# Patient Record
Sex: MALE | Race: WHITE | NOT HISPANIC OR LATINO | Employment: FULL TIME | URBAN - METROPOLITAN AREA
[De-identification: names, ages, dates, MRNs, and addresses within clinical notes are randomized per-mention and may not be internally consistent; named-entity substitution may affect disease eponyms.]

---

## 2017-03-06 RX ORDER — SILDENAFIL 100 MG/1
100 TABLET, FILM COATED ORAL PRN
Qty: 10 TAB | Refills: 0 | Status: SHIPPED | OUTPATIENT
Start: 2017-03-06 | End: 2017-05-04 | Stop reason: SDUPTHER

## 2017-03-07 NOTE — TELEPHONE ENCOUNTER
"Pt has not been seen since 8/24/15 and would like a refill because it is a \"special weekend\". I did inform him that there is a possibility it will not be filled since her has not been seen since 2015.    Was the patient seen in the last year in this department? No     Does patient have an active prescription for medications requested? No     Received Request Via: Pharmacy      "

## 2017-05-04 ENCOUNTER — OFFICE VISIT (OUTPATIENT)
Dept: MEDICAL GROUP | Facility: PHYSICIAN GROUP | Age: 52
End: 2017-05-04
Payer: COMMERCIAL

## 2017-05-04 VITALS
RESPIRATION RATE: 16 BRPM | OXYGEN SATURATION: 98 % | DIASTOLIC BLOOD PRESSURE: 76 MMHG | TEMPERATURE: 98.1 F | SYSTOLIC BLOOD PRESSURE: 130 MMHG | HEART RATE: 98 BPM | HEIGHT: 73 IN | BODY MASS INDEX: 40.42 KG/M2 | WEIGHT: 305 LBS

## 2017-05-04 DIAGNOSIS — R73.01 IMPAIRED FASTING BLOOD SUGAR: ICD-10-CM

## 2017-05-04 DIAGNOSIS — E78.5 DYSLIPIDEMIA: ICD-10-CM

## 2017-05-04 DIAGNOSIS — I10 ESSENTIAL HYPERTENSION: ICD-10-CM

## 2017-05-04 DIAGNOSIS — E66.01 MORBID OBESITY WITH BMI OF 40.0-44.9, ADULT (HCC): ICD-10-CM

## 2017-05-04 DIAGNOSIS — E55.9 VITAMIN D INSUFFICIENCY: ICD-10-CM

## 2017-05-04 DIAGNOSIS — G47.33 OSA ON CPAP: ICD-10-CM

## 2017-05-04 DIAGNOSIS — N18.1 CHRONIC KIDNEY DISEASE, STAGE 1: ICD-10-CM

## 2017-05-04 DIAGNOSIS — Z12.5 SCREENING FOR PROSTATE CANCER: ICD-10-CM

## 2017-05-04 PROCEDURE — 99214 OFFICE O/P EST MOD 30 MIN: CPT | Performed by: FAMILY MEDICINE

## 2017-05-04 RX ORDER — SILDENAFIL 100 MG/1
100 TABLET, FILM COATED ORAL PRN
Qty: 10 TAB | Refills: 0 | OUTPATIENT
Start: 2017-05-04

## 2017-05-04 RX ORDER — SILDENAFIL 100 MG/1
100 TABLET, FILM COATED ORAL PRN
Qty: 10 TAB | Refills: 5 | Status: SHIPPED | OUTPATIENT
Start: 2017-05-04 | End: 2019-04-01

## 2017-05-04 RX ORDER — LISINOPRIL 5 MG/1
TABLET ORAL
Qty: 90 TAB | Refills: 1 | Status: SHIPPED | OUTPATIENT
Start: 2017-05-04 | End: 2019-01-31 | Stop reason: SDUPTHER

## 2017-05-04 ASSESSMENT — PATIENT HEALTH QUESTIONNAIRE - PHQ9: CLINICAL INTERPRETATION OF PHQ2 SCORE: 0

## 2017-05-04 ASSESSMENT — ENCOUNTER SYMPTOMS: FATIGUE: 1

## 2017-05-04 NOTE — PROGRESS NOTES
Subjective:      Pankaj Limon is a 51 y.o. male who presents with Medication Refill and Fatigue            Fatigue  Associated symptoms include fatigue.       Patient returns after not being seen in the wild with the last visit in 8/15. He said he has a new insurance and he can't continue to see me anymore. He will have to establish with another physician.    In terms of his hypertension, he continues to take lisinopril 5 mg daily. He denies any side effects.    He has mild dyslipidemia with the last blood work in 2015 showing LDL down from 153-100 but the HDL also went down from 45-38. At that time he was able to lose significant amount of weight with diet and exercise. He has gained his weight back with a total weight gain of 29 pounds since his last visit in 8/15. He said he has not been as active as before because of his work schedule. He has not been watching his diet either.    He also has impaired fasting blood sugar. Fasting blood sugar was already in the diabetic level in the 150s to 160s but his hemoglobin A1c was normal at 5.3 with his last blood work in 8/15.    He continues to use his CPAP machine regularly. He has not been back to the pulmonologist since his last visit in 10/15. He said he will have to establish with another group because of his new insurance.    He has vitamin D insufficiency and he said he continues to take vitamin D supplementation 2000 international units daily.    For his chronic kidney disease stage I, he was followed by Dr. Dawson with the last visit in January 2016.    Past medical history, past surgical history, family history reviewed-no changes    Social history reviewed-no changes    Allergies reviewed-no changes    Medications reviewed-no changes    Review of Systems   Constitutional: Positive for fatigue.    additionally as per history of present illness, the rest are negative.       Objective:     /76 mmHg  Pulse 98  Temp(Src) 36.7 °C (98.1 °F)  Resp 16  Ht  "1.854 m (6' 0.99\")  Wt 138.347 kg (305 lb)  BMI 40.25 kg/m2  SpO2 98%     Physical Exam     Examined alert, awake, oriented, not in distress    Neck-supple, no lymphadenopathy, no thyromegaly  Lungs-clear to auscultation, no rales, no wheezes  Heart-regular rate and rhythm, no murmur  Extremities-no edema, clubbing, cyanosis            Assessment/Plan:     1. Essential hypertension  Blood pressure is running good. He is on low-dose lisinopril 5 mg daily. I refilled his prescription.  - LIPID PROFILE; Future  - COMP METABOLIC PANEL; Future  - HEMOGLOBIN A1C; Future  - PROSTATE SPECIFIC AG SCREENING; Future  - TSH; Future  - VITAMIN D,25 HYDROXY; Future  - CBC WITH DIFFERENTIAL; Future  - lisinopril (PRINIVIL) 5 MG Tab; TAKE ONE TABLET BY MOUTH ONCE DAILY  Dispense: 90 Tab; Refill: 1    2. Dyslipidemia  We will do follow-up lipid panel. Consider treatment depending on his ten-year cardiovascular disease risk.  - LIPID PROFILE; Future  - COMP METABOLIC PANEL; Future  - HEMOGLOBIN A1C; Future  - PROSTATE SPECIFIC AG SCREENING; Future  - TSH; Future  - VITAMIN D,25 HYDROXY; Future  - CBC WITH DIFFERENTIAL; Future    3. Impaired fasting blood sugar  I will do follow-up fasting blood sugar with hemoglobin A1c.  - LIPID PROFILE; Future  - COMP METABOLIC PANEL; Future  - HEMOGLOBIN A1C; Future  - PROSTATE SPECIFIC AG SCREENING; Future  - TSH; Future  - VITAMIN D,25 HYDROXY; Future  - CBC WITH DIFFERENTIAL; Future    4. YOVANA on CPAP  Advised to establish with pulmonologist contracted by insurance.  - LIPID PROFILE; Future  - COMP METABOLIC PANEL; Future  - HEMOGLOBIN A1C; Future  - PROSTATE SPECIFIC AG SCREENING; Future  - TSH; Future  - VITAMIN D,25 HYDROXY; Future  - CBC WITH DIFFERENTIAL; Future    5. Vitamin D insufficiency  Continue vitamin D supplementation and we will check vitamin D level.  - LIPID PROFILE; Future  - COMP METABOLIC PANEL; Future  - HEMOGLOBIN A1C; Future  - PROSTATE SPECIFIC AG SCREENING; Future  - " TSH; Future  - VITAMIN D,25 HYDROXY; Future  - CBC WITH DIFFERENTIAL; Future    6. Screening for prostate cancer  We will do screening PSA with his blood work.  - LIPID PROFILE; Future  - COMP METABOLIC PANEL; Future  - HEMOGLOBIN A1C; Future  - PROSTATE SPECIFIC AG SCREENING; Future  - TSH; Future  - VITAMIN D,25 HYDROXY; Future  - CBC WITH DIFFERENTIAL; Future    7. Morbid obesity with BMI of 40.0-44.9, adult (CMS-[facility])  Discussed diet, exercise and weight loss.  - Patient identified as having weight management issue.  Appropriate orders and counseling given.    8. Chronic kidney disease stage I  We will do follow-up blood work. Continue to avoid nephrotoxic agents. He will establish with the nephrologist contracted with his insurance.    This is the patient's last visit because his insurance is not contracted with us.      Please note that this dictation was created using voice recognition software. I have worked with consultants from the vendor as well as technical experts from [employer] to optimize the interface. I have made every reasonable attempt to correct obvious errors, but I expect that there are errors of grammar and possibly content I did not discover before finalizing the note.

## 2017-05-04 NOTE — Clinical Note
[facility]  Melly Jimenez M.D.  [address]  [place] [address]  Fax: [phone]   Authorization for Release/Disclosure of   Protected Health Information   Name: STACEY KOWALSKI : 1965 SSN: [id number]   Address: [address] [place] [address] [address]  [place] [address] Phone:    [phone] (home)    I authorize the entity listed below to release/disclose the PHI below to:  Stacey Kowalski (self)   Provider or Entity Name:  Dr.Bella Jimenez    Address   City, [facility], [facility]   Phone:      Fax:     Reason for request: continuity of care   Information to be released:    [  ] LAST COLONOSCOPY,  including any PATH REPORT and follow-up  [  ] LAST FIT/COLOGUARD RESULT [  ] LAST DEXA  [  ] LAST MAMMOGRAM  [  ] LAST PAP  [  ] LAST LABS [  ] RETINA EXAM REPORT  [  ] IMMUNIZATION RECORDS  [ x ] Release all info      [x  ] Check here and initial the line next to each item to release ALL health information INCLUDING  _____ Care and treatment for drug and / or alcohol abuse  _____ HIV testing, infection status, or AIDS  _____ Genetic Testing    DATES OF SERVICE OR TIME PERIOD TO BE DISCLOSED: _____________  I understand and acknowledge that:  * This Authorization may be revoked at any time by you in writing, except if your health information has already been used or disclosed.  * Your health information that will be used or disclosed as a result of you signing this authorization could be re-disclosed by the recipient. If this occurs, your re-disclosed health information may no longer be protected by State or Federal laws.  * You may refuse to sign this Authorization. Your refusal will not affect your ability to obtain treatment.  * This Authorization becomes effective upon signing and will  on (date) __________.      If no date is indicated, this Authorization will  one (1) year from the signature date.    Name: Stacey Kowalski    Signature:   Date:     2017       PLEASE FAX REQUESTED RECORDS BACK TO: [phone]

## 2017-05-04 NOTE — TELEPHONE ENCOUNTER
Was the patient seen in the last year in this department? Yes     Does patient have an active prescription for medications requested? No     Received Request Via: Pharmacy-viagra renewal on fax states pt needs max daily dose

## 2017-05-04 NOTE — MR AVS SNAPSHOT
"        Pankaj Ashly   2017 7:40 AM   Office Visit   MRN: [id number]    Department:  [facility]   Dept Phone:  [phone]    Description:  Male : 1965   Provider:  Melly Jimenez M.D.           Reason for Visit     Medication Refill     Fatigue           Allergies as of 2017     No Known Allergies      You were diagnosed with     Essential hypertension   [2695375]       Dyslipidemia   [380089]       Impaired fasting blood sugar   [375544]       YOVANA on CPAP   [068362]       Vitamin D insufficiency   [177330]       Chronic kidney disease, stage 1   [5644310]       Screening for prostate cancer   [871919]       Morbid obesity with BMI of 40.0-44.9, adult (CMS-HCC)   [941296]         Vital Signs     Blood Pressure Pulse Temperature Respirations Height Weight    130/76 mmHg 98 36.7 °C (98.1 °F) 16 1.854 m (6' 0.99\") 138.347 kg (305 lb)    Body Mass Index Oxygen Saturation Smoking Status             40.25 kg/m2 98% Never Smoker          Basic Information     Date Of Birth Sex Race Ethnicity Preferred Language    1965 Male White Non- English      Problem List              ICD-10-CM Priority Class Noted - Resolved    HTN (hypertension) I10   Unknown - Present    ED (erectile dysfunction) N52.9   Unknown - Present    YOVANA on CPAP G47.33, Z99.89   Unknown - Present    Morbid obesity with BMI of 40.0-44.9, adult ([facility]) E66.01, Z68.41   2017 - Present      Health Maintenance        Date Due Completion Dates    COLONOSCOPY 2023 (Done)    Override on 2013: Done    IMM DTaP/Tdap/Td Vaccine (2 - Td) 2024            Current Immunizations     Influenza Vaccine Quad Inj (Pf) 10/3/2016, 10/21/2014    Tdap Vaccine 2014      Below and/or attached are the medications your provider expects you to take. Review all of your home medications and newly ordered medications with your provider and/or pharmacist. Follow medication instructions as directed by your provider " and/or pharmacist. Please keep your medication list with you and share with your provider. Update the information when medications are discontinued, doses are changed, or new medications (including over-the-counter products) are added; and carry medication information at all times in the event of emergency situations     Allergies:  No Known Allergies          Medications  Valid as of: May 04, 2017 -  8:32 AM    Generic Name Brand Name Tablet Size Instructions for use    Colchicine (Tab) COLCRYS 0.6 MG         Colchicine (Tab) COLCRYS 0.6 MG Take 0.6 mg by mouth every day.        Indomethacin (Cap) INDOCIN 50 MG         Lisinopril (Tab) PRINIVIL 5 MG TAKE ONE TABLET BY MOUTH ONCE DAILY        Sildenafil Citrate (Tab) VIAGRA 100 MG Take 1 Tab by mouth as needed for Erectile Dysfunction.        .                 Medicines prescribed today were sent to:     [facility] [address] [facility], [place] - [address]    [address] [place] [address]    Phone: [phone] Fax: [phone]    Open 24 Hours?: No      Medication refill instructions:       If your prescription bottle indicates you have medication refills left, it is not necessary to call your provider’s office. Please contact your pharmacy and they will refill your medication.    If your prescription bottle indicates you do not have any refills left, you may request refills at any time through one of the following ways: The online [employer] system (except Urgent Care), by calling your provider’s office, or by asking your pharmacy to contact your provider’s office with a refill request. Medication refills are processed only during regular business hours and may not be available until the next business day. Your provider may request additional information or to have a follow-up visit with you prior to refilling your medication.   *Please Note: Medication refills are assigned a new Rx number when refilled electronically. Your pharmacy may indicate that no  refills were authorized even though a new prescription for the same medication is available at the pharmacy. Please request the medicine by name with the pharmacy before contacting your provider for a refill.        Your To Do List     Future Labs/Procedures Complete By Expires    CBC WITH DIFFERENTIAL  As directed 5/5/2018    COMP METABOLIC PANEL  As directed 5/5/2018    HEMOGLOBIN A1C  As directed 5/5/2018    LIPID PROFILE  As directed 5/5/2018    PROSTATE SPECIFIC AG SCREENING  As directed 5/5/2018    TSH  As directed 5/5/2018    VITAMIN D,25 HYDROXY  As directed 5/5/2018      Instructions    Patient instructions given regarding labs, x-rays, medications, referral and followup appointment.          [employer] Access Code: Activation code not generated  Current [employer] Status: Active

## 2017-05-05 ENCOUNTER — TELEPHONE (OUTPATIENT)
Dept: MEDICAL GROUP | Facility: PHYSICIAN GROUP | Age: 52
End: 2017-05-05

## 2017-05-05 NOTE — TELEPHONE ENCOUNTER
1. Caller Name: [facility]                       Call Back Number: [phone]    2. Message: Pharmacy notified of Dr. Burns message.     3. Patient approves office to leave a detailed voicemail/MyChart message: N\A    JODI Riggs, [facility]        Caller: Unspecified (Today, 9:40 AM)                     He suppose not to take it daily. He can take it as needed. Max dose he can take is 100 mg   Chris Burns M.D.

## 2017-05-05 NOTE — TELEPHONE ENCOUNTER
1. Caller Name:Delroy Thakur pharmacist          Call Back Number: [phone]     2. Message: Pharmacist calling to see what is the max daily does for pt viagra rx? To Dr. Jimenez to advise.    3. Patient approves office to leave a detailed voicemail/MyChart message: N\A

## 2017-06-30 ENCOUNTER — TELEPHONE (OUTPATIENT)
Dept: MEDICAL GROUP | Facility: PHYSICIAN GROUP | Age: 52
End: 2017-06-30

## 2017-06-30 NOTE — TELEPHONE ENCOUNTER
Patient called earlier requesting his lab results from José Miguel - adonay in May.  We did not have the results so patient had José Miguel fax them over. To  to review.

## 2017-06-30 NOTE — Clinical Note
July 7, 2017        Pankaj Limon  [address]  [address]  [place] [address]

## 2017-07-03 NOTE — TELEPHONE ENCOUNTER
I reviewed the patient's labs. I ordered several labs but I only have the CMP. Missing labs are CBC, hemoglobin A1c, lipid panel, vitamin D, TSH, PSA. His blood work came back blood sugar is very high in the 300s. This is already in a diabetic level. His kidney function is about the same as before consistent with his kidney disease. He needs to follow-up with his new physician especially because his blood sugar is now in the diabetic level. He told me on his last visit that he can't come back to us anymore because his insurance is not contracted with us.

## 2019-01-31 ENCOUNTER — OFFICE VISIT (OUTPATIENT)
Dept: MEDICAL GROUP | Facility: PHYSICIAN GROUP | Age: 54
End: 2019-01-31
Payer: COMMERCIAL

## 2019-01-31 VITALS
DIASTOLIC BLOOD PRESSURE: 80 MMHG | SYSTOLIC BLOOD PRESSURE: 150 MMHG | WEIGHT: 299.38 LBS | BODY MASS INDEX: 40.55 KG/M2 | HEIGHT: 72 IN | OXYGEN SATURATION: 94 % | TEMPERATURE: 98.6 F | HEART RATE: 96 BPM

## 2019-01-31 DIAGNOSIS — M10.9 GOUT, ARTHRITIS: ICD-10-CM

## 2019-01-31 DIAGNOSIS — Z12.5 SCREENING FOR PROSTATE CANCER: ICD-10-CM

## 2019-01-31 DIAGNOSIS — R73.01 IMPAIRED FASTING BLOOD SUGAR: ICD-10-CM

## 2019-01-31 DIAGNOSIS — I10 ESSENTIAL HYPERTENSION: ICD-10-CM

## 2019-01-31 DIAGNOSIS — N18.9 CHRONIC KIDNEY DISEASE, UNSPECIFIED CKD STAGE: ICD-10-CM

## 2019-01-31 DIAGNOSIS — E55.9 VITAMIN D DEFICIENCY: ICD-10-CM

## 2019-01-31 DIAGNOSIS — E78.5 DYSLIPIDEMIA: ICD-10-CM

## 2019-01-31 DIAGNOSIS — G47.33 OSA ON CPAP: ICD-10-CM

## 2019-01-31 DIAGNOSIS — D36.9 ADENOMATOUS POLYP: ICD-10-CM

## 2019-01-31 PROCEDURE — 99214 OFFICE O/P EST MOD 30 MIN: CPT | Performed by: FAMILY MEDICINE

## 2019-01-31 RX ORDER — LISINOPRIL 5 MG/1
TABLET ORAL
Qty: 90 TAB | Refills: 1 | Status: SHIPPED | OUTPATIENT
Start: 2019-01-31 | End: 2019-05-17 | Stop reason: SDUPTHER

## 2019-01-31 ASSESSMENT — PATIENT HEALTH QUESTIONNAIRE - PHQ9: CLINICAL INTERPRETATION OF PHQ2 SCORE: 0

## 2019-02-01 NOTE — PROGRESS NOTES
Subjective:      Pankaj Limon is a 53 y.o. male who presents with Medication Refill (lisinopril, colcrys, indocin) and Referral Needed (cpap)      HPI:  The patient is a 53 [facility]. [facility] He has not been seen for his medical problems since 5/4/2017.    He is requesting a refill on his lisinopril 5mg, which he has been out of for over 6 months. His blood pressure today is 150/80. His last documented blood pressure was 130/76.     He is also requesting a refill on his Colcrys 0.6mg and Indocin 50mg for his gout.  These were originally prescribed by his podiatrist.  He states he gets gout in his great toes on occasion.  Further questioning, he said he has not had full-blown attack of gout.  He sometimes gets mild twinges of pain in his joints.    He states that his weight had improved this past year, however when his mother passed away in July 2018 he started a new job and put the weight back on. He is starting a new exercise regime this week to loose the weight again.     He was seen by Dr. Dawson in nephrology for chronic kidney disease. He received a thorough evaluation, and Dr. Dawson said he had stage 1 kidney disease. He has not followed up since then. His father had a history of kidney transplant.  His last blood work in 2015 showed creatinine increased from 1.6-1.79.    He is requesting another referral to Pulmonology because his CPAP machine is old and not working as well as it used to.  He was [facility] under the [facility] specifically Dr. Riddle with his last visit in 2015.    He has dyslipidemia which she was able to improve with his own efforts with LDL down from 153-100 in 2015.  He has not taken any cholesterol medication.    He also has history of impaired fasting blood sugar.  His blood sugars were running in the diabetic level 140s-160s fasting but hemoglobin A1c was 5.3 which was disproportionate to the fasting blood sugar values.  He has  not had a blood work since 2015.    He is taking a daily vitamin D supplement daily, as well as a daily baby aspirin.     He states he got his flu shot in October of last year through his work.  He is up-to-date with Tdap.  He has history of adenomatous polyp when he had his colonoscopy in 2013 through [facility].  He was supposed to have surveillance colonoscopy in 2018.  He said his insurance is not contracted with [facility] so he needs to go to a different group.      I reviewed the following    Past Medical History:   Diagnosis Date   • ED (erectile dysfunction)    • HTN (hypertension)    • YOVANA on CPAP         Past Surgical History:   Procedure Laterality Date   • COLONOSCOPY WITH POLYP  2013    polyp - adenomatous- [facility]   • ARTHROSCOPY, KNEE      left knee    • OTHER      repair of perforated nasal septum       No Known Allergies    Current Outpatient Prescriptions   Medication Sig Dispense Refill   • lisinopril (PRINIVIL) 5 MG Tab TAKE ONE TABLET BY MOUTH ONCE DAILY 90 Tab 1   • sildenafil citrate (VIAGRA) 100 MG tablet Take 1 Tab by mouth as needed for Erectile Dysfunction. (Patient not taking: Reported on 1/31/2019) 10 Tab 5   • colchicine (COLCRYS) 0.6 MG TABS Take 0.6 mg by mouth every day.     • COLCRYS 0.6 MG TABS      • indomethacin (INDOCIN) 50 MG CAPS        No current facility-administered medications for this visit.         Family History   Problem Relation Age of Onset   • Diabetes Father    • Hyperlipidemia Father    • Stroke Father        Social History     Social History   • Marital status:      Spouse name: N/A   • Number of children: 0   • Years of education: N/A     Occupational History   • controller - [employer]      Social History Main Topics   • Smoking status: Never Smoker   • Smokeless tobacco: Never Used   • Alcohol use 0.0 oz/week      Comment: rare    • Drug use: No   • Sexual activity: Yes     Partners: Female     Other Topics Concern   • Not on file     Social History Narrative   • No  narrative on file        ROS:  As per the HPI as shown above, the rest are negative.       Objective:     /80 (BP Location: Left arm, Patient Position: Sitting, BP Cuff Size: Adult)   Pulse 96   Temp 37 °C (98.6 °F) (Temporal)   Ht 1.829 m (6')   Wt (!) 135.8 kg (299 lb 6.2 oz)   SpO2 94%   BMI 40.60 kg/m²     Physical Exam   Examined alert, awake, oriented, not in distress    Neck-supple, no lymphadenopathy, no thyromegaly  Lungs-clear to auscultation, no rales, no wheezes  Heart-regular rate and rhythm, no murmur  Extremities-no edema, clubbing, cyanosis      Assessment/Plan:   1. Essential hypertension  Blood pressure elevated because he has not been taking the lisinopril for several months.  I have refilled the patient's lisinopril. He will begin taking it regularly and we will recheck his blood pressure in 3 months.  Advised to monitor his blood pressures at home and keep a record.  - lisinopril (PRINIVIL) 5 MG Tab; TAKE ONE TABLET BY MOUTH ONCE DAILY  Dispense: 90 Tab; Refill: 1  - Lipid Profile; Future  - COMP METABOLIC PANEL; Future  - HEMOGLOBIN A1C; Future  - CBC WITH DIFFERENTIAL; Future  - PROSTATE SPECIFIC AG SCREENING; Future  - MICROALBUMIN CREAT RATIO URINE; Future    2. YOVANA on CPAP  I will refer the patient to pulmonology to follow up on his obstructive sleep apnea and evaluate his need for a new CPAP machine.   - REFERRAL TO PULMONOLOGY  - Lipid Profile; Future  - COMP METABOLIC PANEL; Future  - HEMOGLOBIN A1C; Future  - CBC WITH DIFFERENTIAL; Future  - PROSTATE SPECIFIC AG SCREENING; Future  - MICROALBUMIN CREAT RATIO URINE; Future    3. Dyslipidemia  The patient's last blood work was in 2015, so we will repeat blood work and check his cholesterol levels during his next visit in 3 months.  Consider statin depending on his 10-year ASCVD risk.  - Lipid Profile; Future  - COMP METABOLIC PANEL; Future  - HEMOGLOBIN A1C; Future  - CBC WITH DIFFERENTIAL; Future  - PROSTATE SPECIFIC AG  SCREENING; Future  - MICROALBUMIN CREAT RATIO URINE; Future    4. Impaired fasting blood sugar  His A1C and glucose were elevated in 2015.  Fasting blood sugars were in the diabetic levels but hemoglobin A1c was normal which was disproportionate to the elevated blood sugars.  We will do updated blood work as soon as possible..  - Lipid Profile; Future  - COMP METABOLIC PANEL; Future  - HEMOGLOBIN A1C; Future  - CBC WITH DIFFERENTIAL; Future  - PROSTATE SPECIFIC AG SCREENING; Future  - MICROALBUMIN CREAT RATIO URINE; Future    5. Chronic kidney disease, unspecified CKD stage  We will do updated blood work.  Avoid nephrotoxic agents.  Advised follow-up with nephrologist and new referral placed.  Advised proper hydration.  - REFERRAL TO NEPHROLOGY  - Lipid Profile; Future  - COMP METABOLIC PANEL; Future  - HEMOGLOBIN A1C; Future  - CBC WITH DIFFERENTIAL; Future  - PROSTATE SPECIFIC AG SCREENING; Future  - MICROALBUMIN CREAT RATIO URINE; Future    6. Vitamin D deficiency  We will do vitamin D level.  - VITAMIN D,25 HYDROXY; Future    7. Gout, arthritis  He has not had a full-blown attack of gout in a while.  He has occasional twinges of pain in the joint which is not typical for acute attack of gout.  I will check uric acid level.  Avoid NSAIDs including diclofenac which he was taking before for acute attack.  - URIC ACID; Future    8. BMI 40.0-44.9, adult ([facility])  The patient has agreed to see the weight loss clinic here at [facility]. He will also begin a new healthy diet and exercise regime.  Patient's body mass index is 40.6 kg/m². Exercise and nutrition counseling were performed at this visit.  - Patient identified as having weight management issue.  Appropriate orders and counseling given.  - REFERRAL TO [facility] HEALTH IMPROVEMENT PROGRAMS (HIP) Services Requested: Weight Management Program, Physician Medical Weight Management Program; Reason for Referral? BMI>30; Reason for Visit: Overweight/Obesity; Future    9.  Screening for prostate cancer  We will do screening PSA.  - Lipid Profile; Future  - COMP METABOLIC PANEL; Future  - HEMOGLOBIN A1C; Future  - CBC WITH DIFFERENTIAL; Future  - PROSTATE SPECIFIC AG SCREENING; Future  - MICROALBUMIN CREAT RATIO URINE; Future    10. Adenomatous polyp  He was supposed to have surveillance colonoscopy in 2018.  Referral placed to GI consultants as per patient the [facility] is not contracted with his insurance.  - REFERRAL TO GASTROENTEROLOGY      Follow up with me in 3 months.  Discussed the need to be compliant with appointment and medication and to make sure he comes in for his follow-up in 3 months.        Rebecca FREEMAN (Scribe), am scribing for, and in the presence of, Melly Jimenez MD     Electronically signed by: Rebecca Monahan (Scribe), 1/31/2019    Melly FREEMAN MD personally performed the services described in this documentation, as scribed by Rebecca Monahan in my presence, and it is both accurate and complete.

## 2019-02-22 ENCOUNTER — OFFICE VISIT (OUTPATIENT)
Dept: NEPHROLOGY | Facility: MEDICAL CENTER | Age: 54
End: 2019-02-22
Payer: COMMERCIAL

## 2019-02-22 VITALS
TEMPERATURE: 97.2 F | HEART RATE: 99 BPM | RESPIRATION RATE: 16 BRPM | DIASTOLIC BLOOD PRESSURE: 80 MMHG | SYSTOLIC BLOOD PRESSURE: 118 MMHG | BODY MASS INDEX: 40.09 KG/M2 | WEIGHT: 296 LBS | HEIGHT: 72 IN | OXYGEN SATURATION: 95 %

## 2019-02-22 DIAGNOSIS — I10 ESSENTIAL HYPERTENSION: ICD-10-CM

## 2019-02-22 DIAGNOSIS — N18.2 STAGE 2 CHRONIC KIDNEY DISEASE: ICD-10-CM

## 2019-02-22 PROCEDURE — 99213 OFFICE O/P EST LOW 20 MIN: CPT | Performed by: INTERNAL MEDICINE

## 2019-02-22 RX ORDER — CHOLECALCIFEROL (VITAMIN D3) 50 MCG
6000 TABLET ORAL EVERY MORNING
COMMUNITY

## 2019-02-22 ASSESSMENT — ENCOUNTER SYMPTOMS
CHILLS: 0
PALPITATIONS: 0
FEVER: 0

## 2019-02-22 NOTE — PROGRESS NOTES
Subjective:      Pankaj Limon is a 53 y.o. male who presents with CKD2 New Patient (Chronic Kidney disease)            HPI  53 year old with a history of hypertension and a father with a kidney transplant the father's kidney transplant was felt to be due to diabetes.  The patient himself is not been formally diagnosed with diabetes.  He is here with an elevated serum creatinine.  He was last seen in 2015 and at that time had a 24-hour urine which showed a GFR of 100 mL/min.    His serum creatinine has increased though it was at 1.76 in 2014.  In 2015 Cr was 1.79.  Microalbumin to creatinine ratio in 2015 was 195.  We have no current value.  LDL is 100.    He does urinate once at night, overall however no problems emptying the bladder.    Review of Systems   Constitutional: Negative for chills and fever.   Cardiovascular: Negative for chest pain and palpitations.   All other systems reviewed and are negative.         Objective:     /80 (BP Location: Right arm, Patient Position: Sitting, BP Cuff Size: Large adult)   Pulse 99   Temp 36.2 °C (97.2 °F) (Temporal)   Resp 16   Ht 1.829 m (6')   Wt (!) 134.3 kg (296 lb)   SpO2 95%   BMI 40.14 kg/m²      Physical Exam   Constitutional: He is oriented to person, place, and time. He appears well-developed and well-nourished.   HENT:   Head: Normocephalic and atraumatic.   Cardiovascular: Normal rate and regular rhythm.    Pulmonary/Chest: Effort normal and breath sounds normal.   Abdominal: Soft. Bowel sounds are normal.   Musculoskeletal: He exhibits no edema or deformity.   Neurological: He is alert and oriented to person, place, and time.   Skin: Skin is warm and dry.   Psychiatric: He has a normal mood and affect. His behavior is normal.               Assessment/Plan:     1. Stage 1 chronic kidney disease  Patient with chronic kidney disease stage II in the past but with GFR of 100, appears to be stable at the current period of time.  Awaiting labs.    2.  Essential hypertension  Blood pressures under control at this time.

## 2019-04-01 ENCOUNTER — OFFICE VISIT (OUTPATIENT)
Dept: HEALTH INFORMATION MANAGEMENT | Facility: MEDICAL CENTER | Age: 54
End: 2019-04-01
Payer: COMMERCIAL

## 2019-04-01 VITALS
WEIGHT: 292.1 LBS | HEART RATE: 97 BPM | DIASTOLIC BLOOD PRESSURE: 80 MMHG | HEIGHT: 72 IN | BODY MASS INDEX: 39.56 KG/M2 | SYSTOLIC BLOOD PRESSURE: 122 MMHG | OXYGEN SATURATION: 96 %

## 2019-04-01 DIAGNOSIS — R53.82 CHRONIC FATIGUE: ICD-10-CM

## 2019-04-01 DIAGNOSIS — R63.5 WEIGHT GAIN: ICD-10-CM

## 2019-04-01 DIAGNOSIS — E66.01 MORBID OBESITY WITH BMI OF 40.0-44.9, ADULT (HCC): ICD-10-CM

## 2019-04-01 DIAGNOSIS — G47.33 OSA ON CPAP: ICD-10-CM

## 2019-04-01 DIAGNOSIS — I10 ESSENTIAL HYPERTENSION: ICD-10-CM

## 2019-04-01 DIAGNOSIS — Z87.39 HISTORY OF GOUT: ICD-10-CM

## 2019-04-01 DIAGNOSIS — N18.2 STAGE 2 CHRONIC KIDNEY DISEASE: ICD-10-CM

## 2019-04-01 PROCEDURE — 93000 ELECTROCARDIOGRAM COMPLETE: CPT | Performed by: INTERNAL MEDICINE

## 2019-04-01 PROCEDURE — 99205 OFFICE O/P NEW HI 60 MIN: CPT | Mod: 25 | Performed by: INTERNAL MEDICINE

## 2019-04-01 PROCEDURE — 97802 MEDICAL NUTRITION INDIV IN: CPT | Performed by: DIETITIAN, REGISTERED

## 2019-04-01 NOTE — PROGRESS NOTES
Bariatric Medicine H&P  Chief Complaint   Patient presents with   • Weight Gain       Referred by:  Melly Jimenez M.D.    History of Present Illness:   Pankaj Limon is a 53 y.o.  male who presents for weight management and to help address co-morbidities related to overweight, including  HTN, YOVANA, gout.    The patient is concerned about weight he has gained in the last year due to work related stress.  Earlier in 2018, he had gotten his weight down in the 260 range but then regained weight over the last year.  He is goal oriented to lose weight, would like to lose about 50 pounds.  He has not been on anti-obesity medication in the past, finds if he is more mindful, uses stimulus narrowing, reduces fast food and soda intake, his weight improves.    See RD note for full details on current intake.  For breakfast having an Arbon shake with almond milk and fruit or oatmeal, sandwich for lunch, a large meal for dinner including white meat.  Snacks on fruits such as apples or oranges, almonds during the day, sometimes chips.  Stop drinking regular soda, mostly drinks 0-calorie juice or water.  Likes Chinese food.    Has not had a gout flare, uses colchicine only as needed, with uric acid level pending.  Has not had updated labs in over 2 years, with a markedly abnormal creatinine at that time.  Blood pressure controlled on lisinopril.  Sleep has been stable with CPAP for some time.      Behavior-Related History:  Binge eating screen: Negative  H/o abuse: Negative     Exercise:   Hiking, biking 1-2 times per week     Review of Systems   Positive for fatigue, stiffness in his feet.  Does snore while he sleeps.  Sleep apnea screen: Positive, on CPAP  All other ROS were reviewed with patient today and are negative.      PMH/PSH:  I have reviewed the patient's medical, social and family history, allergies, and medications today.  Prior records reviewed.  Personal Hx of Bariatric Surgery:  negative   for Urology  [place]    Physical Exam:   /80 (BP Location: Left arm, Patient Position: Sitting, BP Cuff Size: Adult)   Pulse 97   Ht 1.829 m (6')   Wt (!) 132.5 kg (292 lb 1.6 oz)   SpO2 96%   BMI 39.62 kg/m²   Waist Measurement   Waist: 50 inch/inches    Body fat % 40.7  REE 2394 kcal/day    Constitutional: Oriented to person, place, and time and well-developed, well-nourished, and in no distress.    HENT: No facial plethora.  No Cushingoid features.  No scalloped tongue.  No dental erosions.  No swollen parotids.  Head: Normocephalic.   Eyes: EOM are normal. Pupils are equal, round, and reactive to light. No periorbital edema.  No lateral thinning of eyebrows.  No vertical nystagmus.  Neck: Normal range of motion. Neck supple. No thyromegaly present. No buffalo hump.  Cardiovascular: Normal rate and regular rhythm.  No murmur heard.  Pulmonary/Chest: Effort normal and breath sounds normal. No wheezes.   Abdominal: Soft. Bowel sounds are normal. No pannus but markedly distended abdomen.  No ascites.  No easily palpable hepatosplenomegaly.   Musculoskeletal: Normal range of motion. No edema.   Neurological: Alert and oriented to person, place, and time. Normal reflexes. No cranial nerve deficit. No muscle weakness.  Gait normal.   Skin: Warm and dry. Not diaphoretic.   No acanthosis nigricans.  Not excessively dry, scaly.  No acne.  No bruising/ecchymosis.  No hyperpigmentation.  No xanthomas or acrochordon.    Psychiatric: Mood, memory, affect and judgment normal.     Laboratory:   Labs pending this wk  EKG: Sinus rhythm, poor R wave progression.  Rate 96.  Corrected QT 0.404  Ordered and reviewed by me today.    Dietitian Assessment: I have reviewed the Dietitian's assessment related to this encounter.       ASSESSMENT/PLAN:  Body mass index is 39.62 kg/m².   Obesity Stage ([place]) 2; Class 2    1. Essential hypertension     2. Stage 2 chronic kidney disease     3. YOVANA on CPAP     4. Morbid obesity with BMI of  40.0-44.9, adult (HCC)     5. Weight gain     6. History of gout     7. Chronic fatigue       Given the patient's food recall, weight gain likely related to heavy processed food intake including processed carbohydrates and high total kcal intake.  Has had some recent weight loss with reduction in above.  Start tracking to better assess total kcal and CHO intake.  Continue stimulus narrowing, as he has begun.  Fatigue, hypertension, sleep should improve with weight loss.  Update uric acid levels as recently ordered, to avoid gout flare with weight loss.  Renal function to be updated via upcoming nephrology visit.  Increase activity after he has begun weight loss.    The patient and I have discussed at length and agree to the following recommendations, which are all addressing the above diagnoses:    Weight Goal: 5% wt loss at one month after start (pt goal weight is 240 lb)  Diet:   Arbonne shake for breakfast, not interested in changing meal replacement shake at this time  High Protein/Low Carb Meals and 2 snacks between meals daily  >100 g protein, <100 g total carbs daily, started less than 2200 kcal per day  64+ oz water per day  Avoid sweet drinks and sodas as he has started doing  Track daily intake with My Fitness Pal, bring to next visit  Physical Activity: Increase activity to 3 times per week hiking, biking  Risk level for moderate/vigorous exercise program:   Low  New Rx:   None.  Patient defers anti-obesity medication  Behavior change:   Tracking, more mindful eating, increase activity  Follow-up: one month with MD, 2 wks with RD    Face to face time spent 60 minutes,  with >50% of time devoted to one on one counseling on weight management issues, as documented above.      Patient's body mass index is 39.62 kg/m². Exercise and nutrition counseling were performed at this visit.        Thank you for your referral!

## 2019-04-02 NOTE — PROGRESS NOTES
4/1/2019   Referring Provider: JODI Cruz 53 y.o.        Time in/out: 4:18-4:35    Anthropometrics/Objective  Vitals:    04/01/19 1520   BP: 122/80   Weight: (!) 132.5 kg (292 lb 1.6 oz)   Height: 1.829 m (6')     BMI: Body mass index is 39.62 kg/m².  Stated Goal Weight: 225-240 lbs   See comprehensive patient history form for further information     Subjective:  Pt is here today for the initial screening visit for the medical weight management program.   - lost weight to 260, but regained with increased work load   - fast food, soda frequently, but has reduced both   - using Arbonne shake, likes to have an orange in it to get vitamin C   - snacks on almonds throughout the day   - interested in learning about tracking   - no recent lab values, pending     See Medical Questionnaire for more detailed diet history       Nutrition Diagnosis (PES Statement)  · Obesity related to excessive energy intake and inadequate energy expenditure as evidenced by BMI >30     Client history:  Condition(s) associated with a diagnosis or treatment (specify) YOVANA, HTN, CKD/2, hx of gout, chronic fatigue     Biochemical data, medical test and procedures  Lab Results   Component Value Date/Time    HBA1C 5.3 08/20/2015 07:34 AM     No results found for: POCGLUCOSE  Lab Results   Component Value Date/Time    CHOLSTRLTOT 160 08/20/2015 07:34 AM     (H) 08/20/2015 07:34 AM    HDL 38 (A) 08/20/2015 07:34 AM    TRIGLYCERIDE 111 08/20/2015 07:34 AM         Nutrition Intervention  Nutrition Prescription  Recommended Daily Kcals: 2300 Kcal based on REE of 2394   Recommended Daily Protein: 100g or more per day per Dr. Alba   Recommended Daily CHO: 100g or less per day per Dr. Alba     Meal Plan Recommendation   Low calorie, high protein/low CHO diet with 1 meal replacements per day per Dr. Alba   *pt would like to include 1 orange in his AM Arbonne shake, also suggested other high vitamin C  alternatives u such as berries (strawberries)      Comprehensive Nutrition Education Instruction or training leading to in-depth nutrition related knowledge about:   Benefits to following meal plan, Combine carb, protein and fat at each meal,  Meal timing and spacing, Portion control, Sweets and alcohol in moderation.    Handouts provided regarding topics discussed:   - LCD Plan   - MyPlate   - Snack list with fruit   - MyFitnessPal How-To and Recipe Guide    Monitoring & Evaluation Plan    Behavioral-Environmental:  Behavior: Keep a food journal and bring to next appointment   Physical activity: Not discussed on today's visit     Food / Nutrient Intake:  Food intake: Follow meal plan as discussed. Avoid concentrated sweets and processed carbs. Use the plate method for portion control and macronutrient balance. Limit carbs/starch to up to 1/2 cup per meal. Snacks should be 1oz protein + ns veggies or fruit. Fruit once per day.     Fluid intake: Consume at least 64 oz water per day. Avoid all sweetened beverages. Limit coffee to 1 cup a day (ideally no cream or sugar). Avoid alcohol.      Physical Signs / Symptoms:  Weight change to goal     Assessment Notes:  Today I oriented Bijan to Dr. Alba's Medical Weight Management program. Discussed higher protein, lower CHO and calorie controlled meal plan, optional but encouraged use of meal replacements, 3 meals and 2 snacks per day as well as calorie/macro tracking. Bijan will be using a meal replacement for breakfast (Arbonne shake + unsweetened almond milk and 1 orange per pt preference). We discussed how to build protein into each meal and snack, incorporating 1/2 plate non-starchy vegetable twice daily, fruit 1 x day, optional 1/2 cup of complex CHO at meals if desired, avoiding trigger foods and snacks consisting of protein and optional non-starchy vegetable or serving of fruit.     Bijan states he uses and is successful with Healthy Choice meals when trying to  lose weight. Recommended no more than 30 grams CHO per meal, <600 mg sodium, and to add additional non-starchy veggies. He will be tracking using MyHabboPal and aiming for 2300 kcal/d.  He will also be looking at the macros feature and aim for 100g or less of CHO and 100g or more of protein per day per Dr. Alba requirements/recommendations. Recommended no more than 200g CHO (35% of total kcals) as a starting point since I am unsure of what his baseline is and encouraged to work down to the 100g. Bijan notes he is very goal oriented. Recommend helping him set goals with goals sheet at his next visit. He set the following goals on today's visit.     Goals:   1) Keep a food diary on [employer] of all foods/drinks consumed, the amount you consumed (approximately), and the time when it was consumed.   2) No soda  3) No alcohol       F/U: 2 week w/ RD and 4-6 weeks with MD and PATRICK

## 2019-04-12 ENCOUNTER — HOSPITAL ENCOUNTER (OUTPATIENT)
Dept: LAB | Facility: MEDICAL CENTER | Age: 54
End: 2019-04-12
Attending: INTERNAL MEDICINE
Payer: COMMERCIAL

## 2019-04-12 ENCOUNTER — HOSPITAL ENCOUNTER (OUTPATIENT)
Dept: LAB | Facility: MEDICAL CENTER | Age: 54
End: 2019-04-12
Attending: FAMILY MEDICINE
Payer: COMMERCIAL

## 2019-04-12 DIAGNOSIS — E55.9 VITAMIN D DEFICIENCY: ICD-10-CM

## 2019-04-12 DIAGNOSIS — Z12.5 SCREENING FOR PROSTATE CANCER: ICD-10-CM

## 2019-04-12 DIAGNOSIS — G47.33 OSA ON CPAP: ICD-10-CM

## 2019-04-12 DIAGNOSIS — E78.5 DYSLIPIDEMIA: ICD-10-CM

## 2019-04-12 DIAGNOSIS — I10 ESSENTIAL HYPERTENSION: ICD-10-CM

## 2019-04-12 DIAGNOSIS — R73.01 IMPAIRED FASTING BLOOD SUGAR: ICD-10-CM

## 2019-04-12 DIAGNOSIS — M10.9 GOUT, ARTHRITIS: ICD-10-CM

## 2019-04-12 DIAGNOSIS — N18.2 STAGE 2 CHRONIC KIDNEY DISEASE: ICD-10-CM

## 2019-04-12 DIAGNOSIS — N18.9 CHRONIC KIDNEY DISEASE, UNSPECIFIED CKD STAGE: ICD-10-CM

## 2019-04-12 LAB
BASOPHILS # BLD AUTO: 0.8 % (ref 0–1.8)
BASOPHILS # BLD: 0.06 K/UL (ref 0–0.12)
CREAT UR-MCNC: 199.2 MG/DL
EOSINOPHIL # BLD AUTO: 0.38 K/UL (ref 0–0.51)
EOSINOPHIL NFR BLD: 5.4 % (ref 0–6.9)
ERYTHROCYTE [DISTWIDTH] IN BLOOD BY AUTOMATED COUNT: 41.1 FL (ref 35.9–50)
EST. AVERAGE GLUCOSE BLD GHB EST-MCNC: 266 MG/DL
HBA1C MFR BLD: 10.9 % (ref 0–5.6)
HCT VFR BLD AUTO: 43.1 % (ref 42–52)
HGB BLD-MCNC: 15 G/DL (ref 14–18)
IMM GRANULOCYTES # BLD AUTO: 0.01 K/UL (ref 0–0.11)
IMM GRANULOCYTES NFR BLD AUTO: 0.1 % (ref 0–0.9)
LYMPHOCYTES # BLD AUTO: 1.82 K/UL (ref 1–4.8)
LYMPHOCYTES NFR BLD: 25.7 % (ref 22–41)
MCH RBC QN AUTO: 31.4 PG (ref 27–33)
MCHC RBC AUTO-ENTMCNC: 34.8 G/DL (ref 33.7–35.3)
MCV RBC AUTO: 90.2 FL (ref 81.4–97.8)
MICROALBUMIN UR-MCNC: 105.4 MG/DL
MICROALBUMIN/CREAT UR: 529 MG/G (ref 0–30)
MONOCYTES # BLD AUTO: 0.63 K/UL (ref 0–0.85)
MONOCYTES NFR BLD AUTO: 8.9 % (ref 0–13.4)
NEUTROPHILS # BLD AUTO: 4.19 K/UL (ref 1.82–7.42)
NEUTROPHILS NFR BLD: 59.1 % (ref 44–72)
NRBC # BLD AUTO: 0 K/UL
NRBC BLD-RTO: 0 /100 WBC
PLATELET # BLD AUTO: 207 K/UL (ref 164–446)
PMV BLD AUTO: 10.6 FL (ref 9–12.9)
PROT 24H UR-MCNC: 1639.9 MG/24 HR (ref 30–150)
PROT 24H UR-MRATE: 52.9 MG/DL (ref 0–15)
PTH-INTACT SERPL-MCNC: 60.7 PG/ML (ref 14–72)
RBC # BLD AUTO: 4.78 M/UL (ref 4.7–6.1)
SPECIMEN VOL UR: 3100 ML
WBC # BLD AUTO: 7.1 K/UL (ref 4.8–10.8)

## 2019-04-12 PROCEDURE — 82306 VITAMIN D 25 HYDROXY: CPT

## 2019-04-12 PROCEDURE — 82043 UR ALBUMIN QUANTITATIVE: CPT

## 2019-04-12 PROCEDURE — 82575 CREATININE CLEARANCE TEST: CPT

## 2019-04-12 PROCEDURE — 82570 ASSAY OF URINE CREATININE: CPT

## 2019-04-12 PROCEDURE — 83970 ASSAY OF PARATHORMONE: CPT

## 2019-04-12 PROCEDURE — 36415 COLL VENOUS BLD VENIPUNCTURE: CPT

## 2019-04-12 PROCEDURE — 80061 LIPID PANEL: CPT

## 2019-04-12 PROCEDURE — 84153 ASSAY OF PSA TOTAL: CPT

## 2019-04-12 PROCEDURE — 81050 URINALYSIS VOLUME MEASURE: CPT

## 2019-04-12 PROCEDURE — 83036 HEMOGLOBIN GLYCOSYLATED A1C: CPT

## 2019-04-12 PROCEDURE — 80053 COMPREHEN METABOLIC PANEL: CPT

## 2019-04-12 PROCEDURE — 84156 ASSAY OF PROTEIN URINE: CPT

## 2019-04-12 PROCEDURE — 84550 ASSAY OF BLOOD/URIC ACID: CPT

## 2019-04-12 PROCEDURE — 85025 COMPLETE CBC W/AUTO DIFF WBC: CPT

## 2019-04-13 LAB
25(OH)D3 SERPL-MCNC: 26 NG/ML (ref 30–100)
ALBUMIN SERPL BCP-MCNC: 4.2 G/DL (ref 3.2–4.9)
ALBUMIN/GLOB SERPL: 1.6 G/DL
ALP SERPL-CCNC: 53 U/L (ref 30–99)
ALT SERPL-CCNC: 13 U/L (ref 2–50)
ANION GAP SERPL CALC-SCNC: 9 MMOL/L (ref 0–11.9)
AST SERPL-CCNC: 14 U/L (ref 12–45)
BILIRUB SERPL-MCNC: 0.6 MG/DL (ref 0.1–1.5)
BUN SERPL-MCNC: 33 MG/DL (ref 8–22)
CALCIUM SERPL-MCNC: 8.9 MG/DL (ref 8.5–10.5)
CHLORIDE SERPL-SCNC: 104 MMOL/L (ref 96–112)
CHOLEST SERPL-MCNC: 190 MG/DL (ref 100–199)
CO2 SERPL-SCNC: 25 MMOL/L (ref 20–33)
COLLECT DURATION TIME UR: 24 HR
CREAT CL/1.73 SQ M ?TM UR+SERPL-ARVRAT: 65 ML/MIN (ref 97–137)
CREAT SERPL-MCNC: 1.63 MG/DL (ref 0.5–1.4)
CREAT SERPL-MCNC: 1.66 MG/DL (ref 0.5–1.4)
CREAT UR-MCNC: 72.7 MG/DL
FASTING STATUS PATIENT QL REPORTED: NORMAL
GLOBULIN SER CALC-MCNC: 2.7 G/DL (ref 1.9–3.5)
GLUCOSE SERPL-MCNC: 253 MG/DL (ref 65–99)
HDLC SERPL-MCNC: 39 MG/DL
LDLC SERPL CALC-MCNC: 117 MG/DL
POTASSIUM SERPL-SCNC: 4.4 MMOL/L (ref 3.6–5.5)
PROT SERPL-MCNC: 6.9 G/DL (ref 6–8.2)
PSA SERPL-MCNC: 0.61 NG/ML (ref 0–4)
SODIUM SERPL-SCNC: 138 MMOL/L (ref 135–145)
SPECIMEN VOL UR: 3100 ML
TRIGL SERPL-MCNC: 172 MG/DL (ref 0–149)
URATE SERPL-MCNC: 9.4 MG/DL (ref 2.5–8.3)
URINE CREATININE EXCRETED 1125: 2254 MG/24 HR

## 2019-04-17 ENCOUNTER — NON-PROVIDER VISIT (OUTPATIENT)
Dept: HEALTH INFORMATION MANAGEMENT | Facility: MEDICAL CENTER | Age: 54
End: 2019-04-17
Payer: COMMERCIAL

## 2019-04-17 VITALS — WEIGHT: 287.5 LBS | HEIGHT: 72 IN | BODY MASS INDEX: 38.94 KG/M2

## 2019-04-17 DIAGNOSIS — E66.01 MORBID OBESITY WITH BMI OF 40.0-44.9, ADULT (HCC): ICD-10-CM

## 2019-04-17 PROCEDURE — 97803 MED NUTRITION INDIV SUBSEQ: CPT | Performed by: DIETITIAN, REGISTERED

## 2019-04-17 NOTE — PROGRESS NOTES
Nutrition Reassess: Medical Weight Management   Today's date: 4/17/2019     Time: in/out 8:05-8:47  Visit#: 2    Subjective:   - Pankaj has not downloaded [employer] or kept a food journal, but states he feels he knows how much he is getting and feels his CHO intake is between 100-200g per day   - He has not had any alcohol in the last 2 weeks and has only had 1 soda   - He wants to do more PA, but he twisted his ankle ~ 1 week ago and has been experiencing a gout flare in the same foot. Denies intake of briones, sausage, red meat - did have shrimp    Diet history:   Breakfast - Arbonne shake w/ almond milk and an orange  Snack - almonds  Lunch - Healthy Choice w/ 30-35g CHO  Snack - raw spinach from the bag; few handfuls  Dinner - ground turkey or chicken, brown rice, asparagus    Anthropometrics/Objective  Today's weight:    Vitals:    04/17/19 0855   Weight: (!) 130.4 kg (287 lb 8 oz)   Height: 1.829 m (6')     BMI:  Body mass index is 38.99 kg/m².  Starting weight/date 291.5 lb     Total weight change : - 4.0 lb            Meal Plan:   Lower CHO / higher protein / calorie controlled diet per Dr. Reyes with 1 meal replacements per day     ReAssesment/Notes:  Pankaj is overall doing well without tracking or keeping a food journal. Discussed his gout flare and discussed possibility of high protein diet, but the Arbonne shake is plant-based and likely lower in purines, Healthy Choice doesn't have excessive protein intake and he states his portions of protein at dinner are around 4oz. Suggested tart or dark cherry juice supplement (not juice) as this could possibly help and doesn't have a drawback. Suggested adding a plant-based protein like hummus or some more almonds with his spinach in PM and suggested lower purine vegetables as asparagus is moderate; however, all proteins are moderate purines. Suggested he speak with Dr. Reyes regarding his gout at f/u.     Follow-up: 2-3 weeks w/ MD and PATRICK

## 2019-05-02 ENCOUNTER — OFFICE VISIT (OUTPATIENT)
Dept: HEALTH INFORMATION MANAGEMENT | Facility: MEDICAL CENTER | Age: 54
End: 2019-05-02
Payer: COMMERCIAL

## 2019-05-02 ENCOUNTER — OFFICE VISIT (OUTPATIENT)
Dept: MEDICAL GROUP | Facility: PHYSICIAN GROUP | Age: 54
End: 2019-05-02
Payer: COMMERCIAL

## 2019-05-02 VITALS
DIASTOLIC BLOOD PRESSURE: 70 MMHG | TEMPERATURE: 98.7 F | SYSTOLIC BLOOD PRESSURE: 120 MMHG | HEIGHT: 72 IN | OXYGEN SATURATION: 95 % | HEART RATE: 81 BPM | BODY MASS INDEX: 39.27 KG/M2 | WEIGHT: 289.9 LBS

## 2019-05-02 VITALS
HEART RATE: 86 BPM | HEIGHT: 72 IN | DIASTOLIC BLOOD PRESSURE: 76 MMHG | SYSTOLIC BLOOD PRESSURE: 122 MMHG | OXYGEN SATURATION: 97 % | BODY MASS INDEX: 38.42 KG/M2 | WEIGHT: 283.7 LBS

## 2019-05-02 DIAGNOSIS — R53.82 CHRONIC FATIGUE: ICD-10-CM

## 2019-05-02 DIAGNOSIS — M1A.49X0 OTHER SECONDARY CHRONIC GOUT OF MULTIPLE SITES WITHOUT TOPHUS: ICD-10-CM

## 2019-05-02 DIAGNOSIS — E66.01 MORBID OBESITY WITH BMI OF 40.0-44.9, ADULT (HCC): ICD-10-CM

## 2019-05-02 DIAGNOSIS — R80.9 MICROALBUMINURIA: ICD-10-CM

## 2019-05-02 DIAGNOSIS — E78.5 DYSLIPIDEMIA: ICD-10-CM

## 2019-05-02 DIAGNOSIS — I10 ESSENTIAL HYPERTENSION: ICD-10-CM

## 2019-05-02 DIAGNOSIS — G47.33 OSA ON CPAP: ICD-10-CM

## 2019-05-02 DIAGNOSIS — E08.65 DIABETES MELLITUS DUE TO UNDERLYING CONDITION, UNCONTROLLED, WITH HYPERGLYCEMIA (HCC): ICD-10-CM

## 2019-05-02 DIAGNOSIS — M10.9 GOUT, ARTHRITIS: ICD-10-CM

## 2019-05-02 DIAGNOSIS — R79.89 ELEVATED SERUM CREATININE: ICD-10-CM

## 2019-05-02 DIAGNOSIS — R63.5 WEIGHT GAIN: ICD-10-CM

## 2019-05-02 PROCEDURE — 99214 OFFICE O/P EST MOD 30 MIN: CPT | Performed by: INTERNAL MEDICINE

## 2019-05-02 PROCEDURE — 99215 OFFICE O/P EST HI 40 MIN: CPT | Performed by: FAMILY MEDICINE

## 2019-05-02 PROCEDURE — 97803 MED NUTRITION INDIV SUBSEQ: CPT | Performed by: DIETITIAN, REGISTERED

## 2019-05-02 RX ORDER — ATORVASTATIN CALCIUM 20 MG/1
20 TABLET, FILM COATED ORAL
Qty: 90 TAB | Refills: 1 | Status: SHIPPED | OUTPATIENT
Start: 2019-05-02 | End: 2020-03-16

## 2019-05-02 RX ORDER — COLCHICINE 0.6 MG/1
TABLET ORAL
Qty: 30 TAB | Refills: 1 | Status: SHIPPED | OUTPATIENT
Start: 2019-05-02 | End: 2020-11-16

## 2019-05-02 NOTE — PROGRESS NOTES
"Bariatric Medicine Follow Up  Chief Complaint   Patient presents with   • Weight Gain       History of Present Illness:   Pankaj Limon is a 53 y.o. male who presents for weight management follow-up and to help address co-morbidities related to overweight, including HTN, YOVANA, T2DM, gout, elevated GFR.    During the patient's last visit, the following were discussed and recommended:  Weight Goal: 5% wt loss at one month after start (pt goal weight is 240 lb)  Diet:   Arbonne shake for breakfast, not interested in changing meal replacement shake at this time  High Protein/Low Carb Meals and 2 snacks between meals daily  >100 g protein, <100 g total carbs daily, started less than 2200 kcal per day  64+ oz water per day  Avoid sweet drinks and sodas as he has started doing  Track daily intake with My Fitness Pal, bring to next visit  Physical Activity: Increase activity to 3 times per week hiking, biking  Risk level for moderate/vigorous exercise program:   Low  New Rx:   None.  Patient defers anti-obesity medication  Behavior change:   Tracking, more mindful eating, increase activity  Follow-up: one month with MD, 2 wks with RD    Has given up bread, changed to brown rice.  Still using Arbonne shake in am, does not want to change that.  Healthy Choice meal for lunch.  Snacking on almonds, one orange per day.  Typical dinner is chicken, greens.  Drinking more water.    Tracking \"mentally\", wants to use My Fitness Pal.  Does not know how to do tracking.  He feels the changes he has started above are easy to follow, notes he is already beginning to feel better.  He was hoping for better results in the first month, but he acknowledges he has made some changes.    Gout flare two wks ago, could hardly walk.  Has not been using colchicine.  Has not been on Uloric or allopurinol in the past to improve his uric acid levels.  Has a new patient visit with nephrology soon, given elevated creatinine on most recent labs.  Also " meeting with his primary physician later today, to review all of his labs and get started on treatment for T2DM.  This is a new diagnosis for him, has not been on treatment in the past, saw that his fasting glucose, hemoglobin A1c were markedly elevated on most recent labs 4/12/2019.    Blood pressure has been controlled on lisinopril.  Continues vitamin D repletion.  Sleep stable.    Exercise:   walking     Review of Systems   Denies current gout flare, pain in his foot, headaches, lightheadedness or worsening fatigue.  All other ROS were reviewed and are otherwise unchanged from my previous visit with patient.    Physical Exam:    /76 (BP Location: Left arm, Patient Position: Sitting, BP Cuff Size: Large adult)   Pulse 86   Ht 1.829 m (6')   Wt (!) 128.7 kg (283 lb 11.2 oz)   SpO2 97%   BMI 38.48 kg/m²   Waist Measurement   Waist: 49.75 inch/inches  Weight change since last visit: -8.4 lb  Waist Circum change since last visit: 0 in     Constitutional: Oriented to person, place, and time and well-developed, well-nourished, and in no distress.    Head: Normocephalic.   Musculoskeletal: Normal range of motion. No edema.   Neurological: Alert and oriented to person, place, and time. No muscle weakness.  Gait normal.   Skin: Warm and dry. Not diaphoretic.   Psychiatric: Mood, memory, affect and judgment normal.     Laboratory:   Recent labs reviewed.      Dietitian Assessment: I have reviewed the Dietitian's assessment related to this encounter.       ASSESSMENT/PLAN:  Body mass index is 38.48 kg/m².    Obesity Stage ([place]):  2; Class 2    1. Diabetes mellitus due to underlying condition, uncontrolled, with hyperglycemia (HCC)     2. Essential hypertension     3. YOVANA on CPAP     4. Chronic fatigue     5. Weight gain     6. Morbid obesity with BMI of 40.0-44.9, adult (HCC)     7. Other secondary chronic gout of multiple sites without tophus     8. Elevated serum creatinine       The patient has begun to  make lifestyle changes that should help reverse his co-morbidities as above.    Will need to focus on reducing processed CHO intake, as he is now doing, along with total kcal intake, to aid in weight loss efforts.    To prevent further gout flares until dietary changes improve uric acid levels, will need medication to bring down uric acid level closer to 6.    Also needs to start medication for uncontrolled T2DM.    Pt wishes to discuss further with his PCP and nephrology at upcoming visits.    The patient and I have discussed at length and agree to the following recommendations, which are all addressing the above diagnoses:    Weight Goal: 3-5% wt loss each month (pt goal weight is 240 lb)  Diet: Arbonne shake with almond milk qam  Track intake with My Fitness Pal  Keep total CHO intake <100 g/d, total kcal <2000/d  Physical Activity:  Increase walking to start  New Rx: None.  Pt to discuss with PCP.  Behavior change: More careful tracking.  Monitor fasting glucose daily.  Follow-up: 1 mo  Consider referral to Diabetes Education at [facility].  Pt wishes to d/w PCP.    Patient's body mass index is 38.48 kg/m². Exercise and nutrition counseling were performed at this visit.

## 2019-05-02 NOTE — PROGRESS NOTES
Nutrition Reassess: Medical Weight Management   Today's date: 5/2/2019  Referring Provider: No ref. provider found      Time: in/out 812 - 826  Visit#: 3    Subjective:  -Tracking mentally and thinks he is getting < 100 grams CHO as well as < 2,000 kcal/day  -New lab work shows that he has DM - is seeing his PCP tonight     Anthropometrics/Objective  Today's weight:    Vitals:    05/02/19 0741   BP: 122/76   Weight: (!) 128.7 kg (283 lb 11.2 oz)   Height: 1.829 m (6')     BMI:  Body mass index is 38.48 kg/m².  Starting weight/date 292.1#     Total weight change : - 8.4#            Meal Plan:   High protein with 1 meal replacements per day     ReAssesment/Notes:    Bijan is more motivated to track his intake using My Fitness Pal now that his lab work reflects that he has DM; I also think that checking his BG would be a good idea so he can correlate how his BG fluctuates with different kinds and amounts of CHO at meals.    He admits that he has been eating more brown rice at meals, likely more than 1/4 of his plate because he did not realize that brown rice also raises BG.  I explained the difference between brown and white rice is not that brown rice does not raise BG and he better understands this concept now and has agreed to eat no more than 1 cup cooked rice at one time.    Bijan would also like to be more active and is hoping that his gout flare improves so he can hike, bike, and golf soon.    PLAN:   1.  Eat no more than 1 cup starch at meals, even if it is whole grain.   2.  Track your intake with My [place] [employer] Pal or other macie.   3.  Consider checking BG to find out how your BG fluctuates throughout the day.    Follow-up: 1 month

## 2019-05-03 NOTE — PROGRESS NOTES
Subjective:      Pankaj Limon is a 53 y.o. male who presents with Hypertension        HPI:    Patient is here for follow-up of his medical problems as well as for some concerns. Prior to his last visit I had not seen the patient in about two years.    Uncontrolled type 2 diabetes mellitus with microalbuminuria, without long-term current use of insulin  We follow the patient for impaired fasting blood sugar. His A1c is 9.4 and glucose is 253 on most recent lab work placing him in the diabetic range. Patient is not taking anti-diabetic medication. His father had a history of diabetes. Patient is followed by Dr. Reyes of the [facility]. Dr. Reyes has instructed him to start a high protein diet and avoid carbohydrates such as rice. He lost 10 pounds since our last visit.    Microalbuminuria  Patient has history of chronic kidney disease stage I.  Back to her [facility] nephrologist for follow-up.  His most recent blood work showed worsening of his kidney function.  He will see Dr. Dawson in 2 weeks.    Essential hypertension  Patient is compliant with his lisinopril 5 mg. His blood pressure was elevated during our last visit, but he had been out of the medication for six months prior to that visit. His blood pressure is 120/70 in the office today.    Dyslipidemia  For his dyslipidemia, he is managing this with his own efforts.    Gout, arthritis  He takes Colcrys 0.6 mg and Indocin 50 mg for his gout. This was originally prescribed by Dr. Kendrick, Podiatry. He denies any recent gout flare ups. He has a gout flare up about four times a year.  He wants to have refill of the medications in case he has a gout flareup.    Vitamin D deficiency  He is taking vitamin D supplement 2000 IU daily.    Past medical history, past surgical history, family history reviewed-no changes    Social history reviewed-no changes    Allergies reviewed-no changes    Medications reviewed-no changes    ROS:  As per  the HPI as shown above, the rest are negative.       Objective:     /70 (BP Location: Left arm, Patient Position: Sitting, BP Cuff Size: Adult)   Pulse 81   Temp 37.1 °C (98.7 °F) (Temporal)   Ht 1.829 m (6')   Wt (!) 131.5 kg (289 lb 14.5 oz)   SpO2 95%   BMI 39.32 kg/m²     Physical Exam    Examined alert, awake, oriented, not in distress    Neck-supple, no lymphadenopathy, no thyromegaly  Lungs-clear to auscultation, no rales, no wheezes  Heart-regular rate and rhythm, no murmur  Extremities-no edema, clubbing, cyanosis     Labs:  Hospital Outpatient Visit on 04/12/2019   Component Date Value Ref Range Status   • Pth, Intact 04/12/2019 60.7  14.0 - 72.0 pg/mL Final   • Weight - Creatinine Clearance 04/12/2019 134.3  kg Final   • Height - Creatinine Clearance 04/12/2019 183  cm Final   • Collection Period, Urine 04/12/2019 24  hr Final   • Creatine Excreated 04/12/2019 2254  mg/24 hr Final   • Creat.Clearance 04/12/2019 65* 97 - 137 mL/min Final   • Total Volume, Urine 04/12/2019 3100  mL Final   • Creatinine Plasma, Creatinine 04/12/2019 1.66* 0.50 - 1.40 mg/dL Final   • Creatinine, Urine 04/12/2019 72.70  mg/dL Final   • Total Volume, Urine 04/12/2019 3100  mL Final   • Total Protein, Urine 04/12/2019 52.9* 0.0 - 15.0 mg/dL Final   • Total Protein, 24 Hour Urine 04/12/2019 1639.9* 30.0 - 150.0 mg/24 Hr Final   Hospital Outpatient Visit on 04/12/2019   Component Date Value Ref Range Status   • Cholesterol,Tot 04/12/2019 190  100 - 199 mg/dL Final   • Triglycerides 04/12/2019 172* 0 - 149 mg/dL Final   • HDL 04/12/2019 39* >=40 mg/dL Final   • LDL 04/12/2019 117* <100 mg/dL Final   • Sodium 04/12/2019 138  135 - 145 mmol/L Final   • Potassium 04/12/2019 4.4  3.6 - 5.5 mmol/L Final   • Chloride 04/12/2019 104  96 - 112 mmol/L Final   • Co2 04/12/2019 25  20 - 33 mmol/L Final   • Anion Gap 04/12/2019 9.0  0.0 - 11.9 Final   • Glucose 04/12/2019 253* 65 - 99 mg/dL Final   • Bun 04/12/2019 33* 8 - 22 mg/dL  Final   • Creatinine 04/12/2019 1.63* 0.50 - 1.40 mg/dL Final   • Calcium 04/12/2019 8.9  8.5 - 10.5 mg/dL Final   • AST(SGOT) 04/12/2019 14  12 - 45 U/L Final   • ALT(SGPT) 04/12/2019 13  2 - 50 U/L Final   • Alkaline Phosphatase 04/12/2019 53  30 - 99 U/L Final   • Total Bilirubin 04/12/2019 0.6  0.1 - 1.5 mg/dL Final   • Albumin 04/12/2019 4.2  3.2 - 4.9 g/dL Final   • Total Protein 04/12/2019 6.9  6.0 - 8.2 g/dL Final   • Globulin 04/12/2019 2.7  1.9 - 3.5 g/dL Final   • A-G Ratio 04/12/2019 1.6  g/dL Final   • Glycohemoglobin 04/12/2019 10.9* 0.0 - 5.6 % Final    Comment: Increased risk for diabetes:  5.7 -6.4%  Diabetes:  >6.4%  Glycemic control for adults with diabetes:  <7.0%  The above interpretations are per ADA guidelines.  Diagnosis  of diabetes mellitus on the basis of elevated Hemoglobin A1c  should be confirmed by repeating the Hb A1c test.     • Est Avg Glucose 04/12/2019 266  mg/dL Final    Comment: The eAG calculation is based on the A1c-Derived Daily Glucose  (ADAG) study.  See the ADA's website for additional information.     • WBC 04/12/2019 7.1  4.8 - 10.8 K/uL Final   • RBC 04/12/2019 4.78  4.70 - 6.10 M/uL Final   • Hemoglobin 04/12/2019 15.0  14.0 - 18.0 g/dL Final   • Hematocrit 04/12/2019 43.1  42.0 - 52.0 % Final   • MCV 04/12/2019 90.2  81.4 - 97.8 fL Final   • MCH 04/12/2019 31.4  27.0 - 33.0 pg Final   • MCHC 04/12/2019 34.8  33.7 - 35.3 g/dL Final   • RDW 04/12/2019 41.1  35.9 - 50.0 fL Final   • Platelet Count 04/12/2019 207  164 - 446 K/uL Final   • MPV 04/12/2019 10.6  9.0 - 12.9 fL Final   • Neutrophils-Polys 04/12/2019 59.10  44.00 - 72.00 % Final   • Lymphocytes 04/12/2019 25.70  22.00 - 41.00 % Final   • Monocytes 04/12/2019 8.90  0.00 - 13.40 % Final   • Eosinophils 04/12/2019 5.40  0.00 - 6.90 % Final   • Basophils 04/12/2019 0.80  0.00 - 1.80 % Final   • Immature Granulocytes 04/12/2019 0.10  0.00 - 0.90 % Final   • Nucleated RBC 04/12/2019 0.00  /100 WBC Final   •  Neutrophils (Absolute) 04/12/2019 4.19  1.82 - 7.42 K/uL Final    Includes immature neutrophils, if present.   • Lymphs (Absolute) 04/12/2019 1.82  1.00 - 4.80 K/uL Final   • Monos (Absolute) 04/12/2019 0.63  0.00 - 0.85 K/uL Final   • Eos (Absolute) 04/12/2019 0.38  0.00 - 0.51 K/uL Final   • Baso (Absolute) 04/12/2019 0.06  0.00 - 0.12 K/uL Final   • Immature Granulocytes (abs) 04/12/2019 0.01  0.00 - 0.11 K/uL Final   • NRBC (Absolute) 04/12/2019 0.00  K/uL Final   • Prostatic Specific Antigen Tot 04/12/2019 0.61  0.00 - 4.00 ng/mL Final    Comment: The Access Brain[employer] TVbritech PSA assay is a paramagnetic particle,  chemiluminescent immunoassay for the quantitative determination  of total prostate specific antigen (PSA) levels using the  [employer] Studio Immunoassay System. Values obtained with different  methods cannot be used interchangeably for patient monitoring.     • Creatinine, Urine 04/12/2019 199.20  mg/dL Final   • Microalbumin, Urine Random 04/12/2019 105.4  mg/dL Final   • Micro Alb Creat Ratio 04/12/2019 529* 0 - 30 mg/g Final   • 25-Hydroxy   Vitamin D 25 04/12/2019 26* 30 - 100 ng/mL Final    Comment: Adult Ranges:   <20 ng/mL - Deficiency  20-29 ng/mL - Insufficiency   ng/mL - Sufficiency  The [employer]ia Centaur Vitamin D Assay is standardized to the  [facility] reference measurement procedures, a  reference method for the Vitamin D Standardization Program  (VDSP).  The VDSP aligns patient results among 25 (OH)  Vitamin D methods.     • Uric Acid 04/12/2019 9.4* 2.5 - 8.3 mg/dL Final   • Fasting Status 04/12/2019 Fasting   Final   • GFR If  04/12/2019 53* >60 mL/min/1.73 m 2 Final   • GFR If Non  04/12/2019 44* >60 mL/min/1.73 m 2 Final     Assessment/Plan:     1. Uncontrolled type 2 diabetes mellitus with microalbuminuria, without long-term current use of insulin (HCC)  A1c of 9.4 and glucose of 253 on most recent lab work. I explained his diabetes is out of control and the  health risks of type II diabetes mellitus. He understands he must be started on medication.  Because of his decreased kidney function we will be able to put him on metformin, SGLT 2 inhibitor.  If we put him on DPP 4 inhibitor, we will have to put him on a small dose due to his kidney disease which would unlikely help control his diabetes.  If we put him in sulfonylurea this would cause weight gain.  We talked about the GLP-1 receptor agonists especially the once a week medication specifically Ozempic.  I discussed medication dosage instructions and potential side effects.  He is aware that this is not a generic medication and may be expensive terms of co-pay.  There is potential that we will have to get prior approval.  Once he gets the medication he will return for teaching in terms of administration subcutaneously.  I will also prescribe for a glucometer and test strips and lancets.  He will check his blood sugar every day before breakfast and keep a log and bring it to his next visit.  Our goal is for the fasting blood sugar to stay below 150.  He was advised to message me his blood sugar readings in the next 2 weeks after he starts the Ozempic.  We will increase the dose depending on how the sugar runs.  He was educated on how to do fingersticks by my medical assistant Ayush Simmons.  Referral placed to our diabetes clinic for diabetes education and to Lisa Wan RN our certified diabetes educator.  He was given diet sheet to follow which is the plate method.  He will continue seeing our weight loss clinic in conjunction with getting his diabetes under control.  Advised regular exercise and weight loss.  - Semaglutide (OZEMPIC) 0.25 or 0.5 MG/DOSE Solution Pen-injector; Inject 0.25 mg as instructed every 7 days.  Dispense: 1 PEN; Refill: 5  - Blood Glucose Monitoring Suppl Device; Meter: Dispense Device of Insurance Preference. Sig. Use as directed for blood sugar monitoring. #1. NR.  Dispense: 1 Device;  Refill: 0  - Blood Glucose Test Strips; Test strips order: Test strips for glucometer. Sig: use once daily before breakfast.  Dispense: 50 Strip; Refill: 5  - Blood Glucose Test Strips; Test strips order: Test strips for glucometer. Sig: use  Once daily before breakfast.  Dispense: 50 Strip; Refill: 5  - HEMOGLOBIN A1C; Future  - Lipid Profile; Future  - Comp Metabolic Panel; Future  - REFERRAL TO DIABETIC EDUCATION Diabetes Self Management Education / Training (DSME/T) and Medical Nutrition Therapy (MNT): Initial Group DSME/MNT as authorized by payor, Follow-Up DSME/MNT as authorized by payor; DSME/T Content: Monitoring Diabete...    2. Microalbuminuria  Patient's microalbumin/creatinine ratio is 529 on most recent lab work.  He asked about the high protein diet if he can continue doing this for his weight loss in the setting of his chronic kidney disease.  I told him to discuss this with his nephrologist on his next visit.     3. Essential hypertension  Well-controlled with lisinopril.  Blood pressure of 120/70 in clinic today.  Made him aware that the lisinopril will also afford renal protection from diabetes.  - HEMOGLOBIN A1C; Future  - Lipid Profile; Future  - Comp Metabolic Panel; Future    4. Dyslipidemia  Elevated triglycerides of 172 and LDL of 117. HDL is low at 39.  Made him aware that per AHA/ACC recommendation patients who are diabetic with LDL cholesterol of  should be on statin for primary prevention.  Patient agrees to proceed.  I discussed medication dosage instructions and side effects.  Recheck blood work in 3 months.  - atorvastatin (LIPITOR) 20 MG Tab; Take 1 Tab by mouth every bedtime.  Dispense: 90 Tab; Refill: 1  - HEMOGLOBIN A1C; Future  - Lipid Profile; Future  - Comp Metabolic Panel; Future    5. Gout, arthritis  Elevated uric acid of 9.4 on most recent lab work. No recent gout flare ups. I asked the patient to stop his indomethacin to avoid further kidney damage.  He will take  colchicine for acute flareup.  He is instructed to take two colchicine at first onset of a gout attack and take another tablet 1 hour after and a total of three tablets over a 24 hour period.  - colchicine (COLCRYS) 0.6 MG Tab; Take 2 tabs by mouth at first onset of gout and take another tablet after 1 hour.  Dispense: 30 Tab; Refill: 1    6.  CKD stage III  Most recent GFR at 44 which is about the same as previous GFR.  Creatinine clearance is 65.  He will see Dr. Dawson for follow-up and he will keep that appointment.  We will await for his input with regards to actual stage of his kidney disease and his recommendations.  For now avoid all NSAIDs.  We will avoid nephrotoxic medications.    Patient was seen for 40 minutes face to face of which > 50% of appointment time was spent on counseling and coordination of care regarding the above.    Jere FREEMAN (Scribe), am scribing for, and in the presence of, Melly Jimenez MD     Electronically signed by: Jere Farmer (Laurita), 5/2/2019    Melly FREEMAN MD personally performed the services described in this documentation, as scribed by Jere Farmer in my presence, and it is both accurate and complete.

## 2019-05-08 ENCOUNTER — NON-PROVIDER VISIT (OUTPATIENT)
Dept: MEDICAL GROUP | Facility: PHYSICIAN GROUP | Age: 54
End: 2019-05-08
Payer: COMMERCIAL

## 2019-05-08 VITALS
HEART RATE: 82 BPM | SYSTOLIC BLOOD PRESSURE: 108 MMHG | BODY MASS INDEX: 38.11 KG/M2 | TEMPERATURE: 98.3 F | DIASTOLIC BLOOD PRESSURE: 72 MMHG | WEIGHT: 281.4 LBS | OXYGEN SATURATION: 95 % | HEIGHT: 72 IN

## 2019-05-08 DIAGNOSIS — E08.65 DIABETES MELLITUS DUE TO UNDERLYING CONDITION, UNCONTROLLED, WITH HYPERGLYCEMIA (HCC): ICD-10-CM

## 2019-05-08 PROCEDURE — 99211 OFF/OP EST MAY X REQ PHY/QHP: CPT | Performed by: INTERNAL MEDICINE

## 2019-05-08 NOTE — PROGRESS NOTES
RN Visit    Subjective:     Reason for visit: Starting Ozempic, first dose 0.25 given today, taught use of new meter    Exercise: sporadic irregular exercise, <half hour walking weekly  Diet: stopped ice cream, coke, [employer]  Patient's body mass index is 38.16 kg/m². Exercise and nutrition counseling were performed at this visit.      Glucose monitoring frequency: fasting  Breakfast: 136-187  Hypoglycemic episodes: no    Objective:     /72   Pulse 82   Temp 36.8 °C (98.3 °F)   Ht 1.829 m (6')   Wt (!) 127.6 kg (281 lb 6.4 oz)   SpO2 95%   BMI 38.16 kg/m²   Monofilament: done   Monofilament testing with a 10 gram force: sensation intact: decreased on left  Visual Inspection: Feet without maceration, ulcers, fissures.  Pedal pulses: intact bilaterally    Plan:     Discussed and educated on:   - All medications, side effects and compliance (discussed carefully)  - Annual eye examinations at Ophthalmology  - Diabetic Meal Plan: foods that contain carbs and plate method  - Factors Affecting Blood Glucose Control: food, illness, medication and stress  - Foot Care: what to look for when checking feet every day and when to contact HCP  - HbA1C: target and what A1C is  - Home glucose monitoring emphasized  - Interpretation of Lab Results  - Long term diabetic complications  - Reminded pt to bring in BS diary at next visit  - Testing Blood Glucose: when to test, recording blood sugars, target range for FSBS and when to contact HCP  - Weight control and daily exercise    Recommended medication changes: Ozempic 0.25 mg x 3 more Wednesdays, then increase to 0.5 mg weekly, send FSBG if questions or problems    Follow-up: 3 months  for Dr. Jimenez, call RN, CDE for problems or questions

## 2019-05-17 ENCOUNTER — OFFICE VISIT (OUTPATIENT)
Dept: NEPHROLOGY | Facility: MEDICAL CENTER | Age: 54
End: 2019-05-17
Payer: COMMERCIAL

## 2019-05-17 VITALS
HEIGHT: 72 IN | TEMPERATURE: 97.9 F | SYSTOLIC BLOOD PRESSURE: 110 MMHG | DIASTOLIC BLOOD PRESSURE: 62 MMHG | RESPIRATION RATE: 14 BRPM | HEART RATE: 68 BPM | OXYGEN SATURATION: 98 % | BODY MASS INDEX: 38.47 KG/M2 | WEIGHT: 284 LBS

## 2019-05-17 DIAGNOSIS — I10 ESSENTIAL HYPERTENSION: ICD-10-CM

## 2019-05-17 DIAGNOSIS — E55.9 VITAMIN D DEFICIENCY: ICD-10-CM

## 2019-05-17 DIAGNOSIS — N18.30 STAGE 3 CHRONIC KIDNEY DISEASE (HCC): ICD-10-CM

## 2019-05-17 PROCEDURE — 99213 OFFICE O/P EST LOW 20 MIN: CPT | Performed by: INTERNAL MEDICINE

## 2019-05-17 RX ORDER — LISINOPRIL 10 MG/1
10 TABLET ORAL DAILY
Qty: 90 TAB | Refills: 3 | Status: SHIPPED | OUTPATIENT
Start: 2019-05-17 | End: 2020-08-19 | Stop reason: SDUPTHER

## 2019-05-24 PROBLEM — N18.30 STAGE 3 CHRONIC KIDNEY DISEASE: Status: ACTIVE | Noted: 2019-05-24

## 2019-05-24 PROBLEM — N18.2 STAGE 2 CHRONIC KIDNEY DISEASE: Status: RESOLVED | Noted: 2019-02-22 | Resolved: 2019-05-24

## 2019-05-24 PROBLEM — E55.9 VITAMIN D DEFICIENCY: Status: ACTIVE | Noted: 2019-05-24

## 2019-05-24 ASSESSMENT — ENCOUNTER SYMPTOMS
PALPITATIONS: 0
FEVER: 0
CHILLS: 0

## 2019-05-24 NOTE — PROGRESS NOTES
Subjective:      Pankaj Limon is a 53 y.o. male who presents with CKD3  Follow-Up and Chronic Kidney Disease            HPI  53 year old with a history of hypertension and a father with a kidney transplant the father's kidney transplant was felt to be due to diabetes.  The patient himself is not been formally diagnosed with diabetes.  He is here with an elevated serum creatinine.  He was last seen in 2015 and at that time had a 24-hour urine which showed a GFR of 100 mL/min.    His serum creatinine has increased though it was at 1.76 in 2014.  In 2015 Cr was 1.79.  Microalbumin to creatinine ratio in 2015 was 195.  We have no current value.  LDL is 100.    He is doing well overal.  Review of Systems   Constitutional: Negative for chills and fever.   Cardiovascular: Negative for chest pain and palpitations.   All other systems reviewed and are negative.         Objective:     /62 (BP Location: Right arm, Patient Position: Sitting, BP Cuff Size: Large adult)   Pulse 68   Temp 36.6 °C (97.9 °F) (Temporal)   Resp 14   Ht 1.829 m (6')   Wt (!) 128.8 kg (284 lb)   SpO2 98%   BMI 38.52 kg/m²      Physical Exam   Constitutional: He is oriented to person, place, and time. He appears well-developed and well-nourished.   HENT:   Head: Normocephalic and atraumatic.   Eyes: Pupils are equal, round, and reactive to light. EOM are normal.   Cardiovascular: Normal rate and regular rhythm.    Pulmonary/Chest: Effort normal and breath sounds normal.   Abdominal: Soft. Bowel sounds are normal.   Musculoskeletal: He exhibits no edema or deformity.   Neurological: He is alert and oriented to person, place, and time.   Skin: Skin is warm and dry.   Psychiatric: He has a normal mood and affect. His behavior is normal.               Assessment/Plan:     1. Essential hypertension  BP at goal, though to start Disease modifying tx, will add low dose ACEi.    - lisinopril (PRINIVIL) 10 MG Tab; Take 1 Tab by mouth every day. TAKE  ONE TABLET BY MOUTH ONCE DAILY  Dispense: 90 Tab; Refill: 3    2. Stage 3 chronic kidney disease (HCC)  Monitoring, add acei    3. Vitamin D deficiency  Continue Vit D    -follow up to ensure tolerance of low dose ACEi  -Titrate if possible with BP

## 2019-06-05 ENCOUNTER — OFFICE VISIT (OUTPATIENT)
Dept: HEALTH INFORMATION MANAGEMENT | Facility: MEDICAL CENTER | Age: 54
End: 2019-06-05
Payer: COMMERCIAL

## 2019-06-05 VITALS
OXYGEN SATURATION: 97 % | BODY MASS INDEX: 37.13 KG/M2 | HEART RATE: 78 BPM | DIASTOLIC BLOOD PRESSURE: 76 MMHG | HEIGHT: 72 IN | SYSTOLIC BLOOD PRESSURE: 122 MMHG | WEIGHT: 274.1 LBS

## 2019-06-05 DIAGNOSIS — R53.82 CHRONIC FATIGUE: ICD-10-CM

## 2019-06-05 DIAGNOSIS — I10 ESSENTIAL HYPERTENSION: ICD-10-CM

## 2019-06-05 DIAGNOSIS — G47.33 OSA ON CPAP: ICD-10-CM

## 2019-06-05 DIAGNOSIS — E55.9 VITAMIN D DEFICIENCY: ICD-10-CM

## 2019-06-05 DIAGNOSIS — E08.65 DIABETES MELLITUS DUE TO UNDERLYING CONDITION, UNCONTROLLED, WITH HYPERGLYCEMIA (HCC): ICD-10-CM

## 2019-06-05 PROCEDURE — 99214 OFFICE O/P EST MOD 30 MIN: CPT | Performed by: INTERNAL MEDICINE

## 2019-06-05 NOTE — PROGRESS NOTES
Bariatric Medicine Follow Up  Chief Complaint   Patient presents with   • Weight Gain       History of Present Illness:   Pankaj Limon is a 53 y.o. male who presents for weight management follow-up and to help address co-morbidities caused by overweight, as below.    During the patient's last visit, the following were discussed and recommended:  Weight Goal: 3-5% wt loss each month (pt goal weight is 240 lb)  Diet: Arbonne shake with almond milk qam  Track intake with My Fitness Pal  Keep total CHO intake <100 g/d, total kcal <2000/d  Physical Activity:  Increase walking to start  New Rx: None.  Pt to discuss with PCP.  Behavior change: More careful tracking.  Monitor fasting glucose daily.  Follow-up: 1 mo  Consider referral to Diabetes Education at [facility].  Pt wishes to d/w PCP.    Has given up soda and bread.  Having more vegetables, fruit.  Still having Arbonne shake/almond milk but not as almond.    Recent fasting glucose readings around 100, sometimes lower.  However, not feeling diaphoretic or weak or hypoglycemic.  Titrating up Ozempic dose to 0.5 mg.  Met once with a diabetic nurse, wonders whether his machine is calibrated correctly, would like to get a fasting glucose via lab testing to compare.    VDD: Continues vitamin D repletion  T2DM: Fasting glucose now well controlled on current diet and Ozempic  YOVANA: Sleep stable  HTN: Blood pressure well controlled on lisinopril, dose recently increased    Exercise:   15 mi hiking over last weekend     Review of Systems   Denies lightheadedness, worsening fatigue, diaphoresis  All other ROS were reviewed and are otherwise unchanged from my previous visit with patient.    Physical Exam:    /76 (BP Location: Left arm, Patient Position: Sitting, BP Cuff Size: Large adult)   Pulse 78   Ht 1.829 m (6')   Wt 124.3 kg (274 lb 1.6 oz)   SpO2 97%   BMI 37.17 kg/m²   Waist Measurement   Waist: 48 inch/inches  Weight change since last visit: -10 lb (-18 lb  total)  Waist Circum change since last visit: -2 in (-2 in total)    Constitutional: Oriented to person, place, and time and well-developed, well-nourished, and in no distress.    Head: Normocephalic.   Musculoskeletal: Normal range of motion. No edema.   Neurological: Alert and oriented to person, place, and time. No muscle weakness.  Gait normal.   Skin: Warm and dry. Not diaphoretic.   Psychiatric: Mood, memory, affect and judgment normal.     Laboratory:   Recent labs reviewed.      ASSESSMENT/PLAN:  Body mass index is 37.17 kg/m².    Obesity Stage ([place]): 2; Class 2    1. Vitamin D deficiency     2. Diabetes mellitus due to underlying condition, uncontrolled, with hyperglycemia (HCC)  Basic Metabolic Panel   3. Chronic fatigue     4. YOVANA on CPAP     5. Essential hypertension  Basic Metabolic Panel     The patient is doing well.  Reducing refined carbohydrates, more mindful eating is serving him well.  Continues stimulus narrowing, increasing activity level.  Blood pressure well controlled.  Continue vitamin D repletion.  Sleep stable.  Increase activity level as he is doing.    The patient and I have discussed at length and agree to the following recommendations, which are all addressing the above diagnoses:    Weight Goal: 3-5% wt loss each month (pt goal weight is 240 lb)  Diet: If tracking, total kcal intake less than 2000/day, less than 100 g total carbs per day  Physical Activity: Walking daily, hiking on weekends  Risk level for moderate/vigorous exercise program: Low  New Rx: May need to reduce Ozempic dose from 0.5 to 0.25 if fasting glucose continuously below 100 or symptomatic  Behavior change: Increase activity level, mindful eating  Follow-up: 6 wks    Patient's body mass index is 37.17 kg/m². Exercise and nutrition counseling were performed at this visit.

## 2019-06-24 ENCOUNTER — SLEEP CENTER VISIT (OUTPATIENT)
Dept: SLEEP MEDICINE | Facility: MEDICAL CENTER | Age: 54
End: 2019-06-24
Payer: COMMERCIAL

## 2019-06-24 VITALS
HEART RATE: 81 BPM | OXYGEN SATURATION: 95 % | SYSTOLIC BLOOD PRESSURE: 122 MMHG | BODY MASS INDEX: 36.98 KG/M2 | DIASTOLIC BLOOD PRESSURE: 82 MMHG | HEIGHT: 72 IN | WEIGHT: 273 LBS | RESPIRATION RATE: 15 BRPM

## 2019-06-24 DIAGNOSIS — G47.33 OSA ON CPAP: ICD-10-CM

## 2019-06-24 PROCEDURE — 99203 OFFICE O/P NEW LOW 30 MIN: CPT | Performed by: FAMILY MEDICINE

## 2019-06-24 NOTE — PROGRESS NOTES
"     [facility]  Consult Note     Date: 6/24/2019 / Time: 1:48 PM    Patient ID:   Name:             Pankaj Limon     YOB: 1965  Age:                 53 y.o.  male   MRN:               [id number]      Thank you for requesting a sleep medicine consultation on Pankaj Limon at the sleep center. He presents today with the chief complaints of YOVANA and to establish as a new pt. He was previously seen by . Pt was diagnosed with YOVANA on 5/25/10 which showed sever YOVANA with AHI of 94/hr. He had a titration on 8/17/10 and the best tolerated was CPAP 9 with improved AHI of 14/hr. He was on CPAP 11 cm however has not been using for over a year. The initial presenting symptoms were  Snoring, and  excessive daytime sleepiness. He is referred by Dr. Jimenez for evaluation and treatment of sleep disorder breathing.     HISTORY OF PRESENT ILLNESS:       At night,  Pankaj Limon goes to bed around 9:30-10:30 pm on weekdays and around 11 pm on the weekends. He gets out of bed at 5:30-6 am on weekdays and at 6:30-8 am on the weekends.  He averages about 7 hrs of sleep on a good night and 4-6 hrs on a bad night. Pt has bad nights about few nights per week. He falls asleep within 5-10 minutes. He awakens 1-2 times during the night due to no obvious reason. It takes him few min to hours to fall back asleep.     He is aware of snoring and witnessed apneas in sleep.  He  denies any symptoms of restless legs syndrome such as an \"urge to move\"  He  legs in the evening or bedtime. He  denies any symptoms of narcolepsy such as sleep paralysis or cataplexy, or any symptoms to suggest parasomnias such as sleep walking or acting out of dreams. He  has not used any medications for the sleep problem.    Overall, he doesnot finds his sleep refreshing. When He  wakes up in the morning, He does feel tired. In terms of  excessive daytime sleepiness,  He complains of sleepiness while  at work, while reading or " watching TV however denies while driving. [place] sleepiness scale score is abnormal at 7/24.He does not take regular naps.He drinks about 0 caffeinated beverages per day.      REVIEW OF SYSTEMS:       Constitutional: Denies fevers, Denies weight changes  Eyes: Denies changes in vision, no eye pain  Ears/Nose/Throat/Mouth: Denies nasal congestion or sore throat   Cardiovascular: Denies chest pain or palpitations   Respiratory: Denies shortness of breath , Denies cough  Gastrointestinal/Hepatic: Denies abdominal pain, nausea, vomiting, diarrhea, constipation or GI bleeding   Genitourinary: Denies bladder dysfunction, dysuria or frequency  Musculoskeletal/Rheum: Denies  joint pain and swelling   Skin/Breast: Denies rash  Neurological: Denies headache, confusion, memory loss or focal weakness/parasthesias  Psychiatric: denies mood disorder     Comprehensive review of systems form is reviewed with the patient and is attached in the EMR.     PMH:  has a past medical history of Chickenpox; Diabetes (HCC); ED (erectile dysfunction); HTN (hypertension); Obesity; YOVANA on CPAP; and Sleep apnea.  MEDS:   Current Outpatient Prescriptions:   •  lisinopril (PRINIVIL) 10 MG Tab, Take 1 Tab by mouth every day. TAKE ONE TABLET BY MOUTH ONCE DAILY, Disp: 90 Tab, Rfl: 3  •  Semaglutide (OZEMPIC) 0.25 or 0.5 MG/DOSE Solution Pen-injector, Inject 0.25 mg as instructed every 7 days., Disp: 1 PEN, Rfl: 5  •  atorvastatin (LIPITOR) 20 MG Tab, Take 1 Tab by mouth every bedtime., Disp: 90 Tab, Rfl: 1  •  MER ASPIRIN PO, Take  by mouth., Disp: , Rfl:   •  B Complex Vitamins (VITAMIN-B COMPLEX PO), Take  by mouth., Disp: , Rfl:   •  Cholecalciferol (VITAMIN D) 2000 UNIT Tab, Take 6,000 Units by mouth every day., Disp: , Rfl:   •  Blood Glucose Monitoring Suppl Device, Meter: Dispense Device of Insurance Preference. Sig. Use as directed for blood sugar monitoring. #1. NR., Disp: 1 Device, Rfl: 0  •  Blood Glucose Test Strips, Test strips  order: Test strips for glucometer. Sig: use once daily before breakfast., Disp: 50 Strip, Rfl: 5  •  Blood Glucose Test Strips, Test strips order: Test strips for glucometer. Sig: use  Once daily before breakfast., Disp: 50 Strip, Rfl: 5  •  colchicine (COLCRYS) 0.6 MG Tab, Take 2 tabs by mouth at first onset of gout and take another tablet after 1 hour., Disp: 30 Tab, Rfl: 1  ALLERGIES: No Known Allergies  SURGHX:   Past Surgical History:   Procedure Laterality Date   • COLONOSCOPY WITH POLYP  03/28/2019    Polyp ascending colon, polyp transverse colon-3 adenomatous polyps, mild diverticulosis sigmoid colon   • COLONOSCOPY WITH POLYP  2013    polyp - adenomatous- [facility]   • ARTHROSCOPY, KNEE      left knee    • OTHER      repair of perforated nasal septum   • SINUSCOPE       SOCHX:  reports that he has never smoked. He has never used smokeless tobacco. He reports that he drinks alcohol. He reports that he does not use drugs.   FH:   Family History   Problem Relation Age of Onset   • Diabetes Father    • Hyperlipidemia Father    • Stroke Father    • Sleep Apnea Neg Hx            Physical Exam:  Vitals/ General Appearance:   Weight/BMI: Body mass index is 37.03 kg/m².  /82 (BP Location: Left arm, Patient Position: Sitting, BP Cuff Size: Adult)   Pulse 81   Resp 15   Ht 1.829 m (6')   Wt 123.8 kg (273 lb)   SpO2 95%   Vitals:    06/24/19 1323   BP: 122/82   BP Location: Left arm   Patient Position: Sitting   BP Cuff Size: Adult   Pulse: 81   Resp: 15   SpO2: 95%   Weight: 123.8 kg (273 lb)   Height: 1.829 m (6')       Pt. is alert and oriented to time, place and person. Cooperative and in no apparent distress.       -Head: Atraumatic, normocephalic.   -. Ears: Normal tympanic membrane and no discharge  -. Nose: No inferior turbinate hypertophy  -. Throat: Oropharynx appears crowded in that the palate is overhanging (Mal[place] Carol scale 3. Tonsils are not enlarged  -. Neck: Supple. No thyromegaly  -. Chest:  Trachea central, no spine deformity   -. Lungs auscultation: B/L good air entry, vesicular breath sounds, no adventitious sounds  -. Heart auscultation: 1st and 2nd heart sounds normal, regular rhythm. No appreciable murmur.  - Extremities: no clubbing, no pedal edema.  - Skin: No rash  - NEUROLOGICAL EXAMINATION: On neurological exam, the patient was alert and oriented x3. speech was clear and fluent without dysarthria.      INVESTIGATIONS:       ASSESSMENT AND PLAN     1.Obstructive Sleep Apnea .He  Is currently on not on CPAP     The pathophysiology of sleep anea and the increased risk of cardiovascular morbidity from untreated sleep apnea is discussed in detail with the patient.      He is urged to avoid supine sleep, weight gain and alcoholic beverages since all of these can worsen sleep apnea. He is cautioned against drowsy driving. If He feels sleepy while driving, He must pull over for a break/nap, rather than persist on the road, in the interest of He own safety and that of others on the road.   Plan   - Continue CPAP at 11 cm with nasal mask    - New CPAP machine of ACAP 10-15 cm    - mask fitting was done and large eson was given to try    - compliance download and records from Dr. Zambrano was reviewed and discussed with the pt   - compliance was reinforced

## 2019-08-23 ENCOUNTER — APPOINTMENT (OUTPATIENT)
Dept: SLEEP MEDICINE | Facility: MEDICAL CENTER | Age: 54
End: 2019-08-23
Payer: COMMERCIAL

## 2019-08-27 ENCOUNTER — APPOINTMENT (OUTPATIENT)
Dept: MEDICAL GROUP | Facility: PHYSICIAN GROUP | Age: 54
End: 2019-08-27
Payer: COMMERCIAL

## 2019-08-27 ENCOUNTER — PATIENT MESSAGE (OUTPATIENT)
Dept: SLEEP MEDICINE | Facility: MEDICAL CENTER | Age: 54
End: 2019-08-27

## 2019-08-28 ENCOUNTER — APPOINTMENT (OUTPATIENT)
Dept: HEALTH INFORMATION MANAGEMENT | Facility: MEDICAL CENTER | Age: 54
End: 2019-08-28
Payer: COMMERCIAL

## 2019-09-19 ENCOUNTER — HOSPITAL ENCOUNTER (OUTPATIENT)
Dept: LAB | Facility: MEDICAL CENTER | Age: 54
End: 2019-09-19
Attending: FAMILY MEDICINE
Payer: COMMERCIAL

## 2019-09-19 DIAGNOSIS — E78.5 DYSLIPIDEMIA: ICD-10-CM

## 2019-09-19 DIAGNOSIS — I10 ESSENTIAL HYPERTENSION: ICD-10-CM

## 2019-09-19 LAB
ALBUMIN SERPL BCP-MCNC: 4.4 G/DL (ref 3.2–4.9)
ALBUMIN/GLOB SERPL: 1.8 G/DL
ALP SERPL-CCNC: 55 U/L (ref 30–99)
ALT SERPL-CCNC: 14 U/L (ref 2–50)
ANION GAP SERPL CALC-SCNC: 8 MMOL/L (ref 0–11.9)
AST SERPL-CCNC: 17 U/L (ref 12–45)
BILIRUB SERPL-MCNC: 0.5 MG/DL (ref 0.1–1.5)
BUN SERPL-MCNC: 39 MG/DL (ref 8–22)
CALCIUM SERPL-MCNC: 9.4 MG/DL (ref 8.5–10.5)
CHLORIDE SERPL-SCNC: 106 MMOL/L (ref 96–112)
CHOLEST SERPL-MCNC: 190 MG/DL (ref 100–199)
CO2 SERPL-SCNC: 25 MMOL/L (ref 20–33)
CREAT SERPL-MCNC: 1.81 MG/DL (ref 0.5–1.4)
EST. AVERAGE GLUCOSE BLD GHB EST-MCNC: 151 MG/DL
FASTING STATUS PATIENT QL REPORTED: NORMAL
GLOBULIN SER CALC-MCNC: 2.4 G/DL (ref 1.9–3.5)
GLUCOSE SERPL-MCNC: 160 MG/DL (ref 65–99)
HBA1C MFR BLD: 6.9 % (ref 0–5.6)
HDLC SERPL-MCNC: 46 MG/DL
LDLC SERPL CALC-MCNC: 113 MG/DL
POTASSIUM SERPL-SCNC: 4.7 MMOL/L (ref 3.6–5.5)
PROT SERPL-MCNC: 6.8 G/DL (ref 6–8.2)
SODIUM SERPL-SCNC: 139 MMOL/L (ref 135–145)
TRIGL SERPL-MCNC: 153 MG/DL (ref 0–149)

## 2019-09-19 PROCEDURE — 80061 LIPID PANEL: CPT

## 2019-09-19 PROCEDURE — 83036 HEMOGLOBIN GLYCOSYLATED A1C: CPT

## 2019-09-19 PROCEDURE — 80053 COMPREHEN METABOLIC PANEL: CPT

## 2019-09-19 PROCEDURE — 36415 COLL VENOUS BLD VENIPUNCTURE: CPT

## 2019-09-30 DIAGNOSIS — N52.9 ED (ERECTILE DYSFUNCTION): ICD-10-CM

## 2019-09-30 RX ORDER — SILDENAFIL 100 MG/1
100 TABLET, FILM COATED ORAL PRN
Qty: 90 TAB | Refills: 1 | Status: SHIPPED | OUTPATIENT
Start: 2019-09-30 | End: 2021-09-16

## 2019-09-30 RX ORDER — SILDENAFIL 100 MG/1
100 TABLET, FILM COATED ORAL PRN
Qty: 30 TAB | Refills: 0 | OUTPATIENT
Start: 2019-09-30

## 2019-09-30 NOTE — TELEPHONE ENCOUNTER
Was the patient seen in the last year in this department? Yes    Does patient have an active prescription for medications requested? No     Received Request Via: Patient via [employer]

## 2019-10-01 ENCOUNTER — OFFICE VISIT (OUTPATIENT)
Dept: HEALTH INFORMATION MANAGEMENT | Facility: MEDICAL CENTER | Age: 54
End: 2019-10-01
Payer: COMMERCIAL

## 2019-10-01 VITALS
HEIGHT: 72 IN | BODY MASS INDEX: 37.94 KG/M2 | SYSTOLIC BLOOD PRESSURE: 118 MMHG | HEART RATE: 98 BPM | DIASTOLIC BLOOD PRESSURE: 76 MMHG | OXYGEN SATURATION: 96 % | WEIGHT: 280.1 LBS

## 2019-10-01 DIAGNOSIS — R63.5 WEIGHT GAIN: ICD-10-CM

## 2019-10-01 DIAGNOSIS — E08.65 DIABETES MELLITUS DUE TO UNDERLYING CONDITION, UNCONTROLLED, WITH HYPERGLYCEMIA (HCC): ICD-10-CM

## 2019-10-01 DIAGNOSIS — N18.30 STAGE 3 CHRONIC KIDNEY DISEASE (HCC): ICD-10-CM

## 2019-10-01 DIAGNOSIS — E66.01 MORBID OBESITY WITH BMI OF 40.0-44.9, ADULT (HCC): ICD-10-CM

## 2019-10-01 DIAGNOSIS — I10 ESSENTIAL HYPERTENSION: ICD-10-CM

## 2019-10-01 PROCEDURE — 99214 OFFICE O/P EST MOD 30 MIN: CPT | Performed by: INTERNAL MEDICINE

## 2019-10-01 NOTE — PROGRESS NOTES
Bariatric Medicine Follow Up  Chief Complaint   Patient presents with   • Weight Gain       History of Present Illness:   Pankaj Limon is a 54 y.o. male who presents for weight management follow-up and to help address co-morbidities caused by overweight, as below.    During the patient's last visit, the following were discussed and recommended:  Weight Goal: 3-5% wt loss each month (pt goal weight is 240 lb)  Diet: If tracking, total kcal intake less than 2000/day, less than 100 g total carbs per day  Physical Activity: Walking daily, hiking on weekends  Risk level for moderate/vigorous exercise program: Low  New Rx: May need to reduce Ozempic dose from 0.5 to 0.25 if fasting glucose continuously below 70 or symptomatic  Behavior change: Increase activity level, mindful eating  Follow-up: 6 wks     The pt has struggled with some overeating since the summer.  Was working much more and stress eating in the evenings after work.  He would like to try antiobesity medication; however, he is worried about his kidney function.    YOVANA:  Cont CPAP, followed by sleep medicine  T2DM:  AM fasting glucose around 140s, continues on Ozempic  CKD3: GFR down slightly to 39 in  9/2019 from 44 in 4/2019    Exercise:   Walking on travels recently but overall exercising much less the last month     Review of Systems   Denies lightheadedness, worsening fatigue, hypoglycemia, diaphoresis, lower extremity edema  All other ROS were reviewed and are otherwise unchanged from my previous visit with patient.    Physical Exam:    /76 (BP Location: Left arm, Patient Position: Sitting, BP Cuff Size: Large adult)   Pulse 98   Ht 1.829 m (6')   Wt (!) 127.1 kg (280 lb 1.6 oz)   SpO2 96%   BMI 37.99 kg/m²   Waist Measurement   Waist: 48.25 inch/inches  Weight change since last visit: +6 lb (-12 lb total)  Waist Circum change since last visit: 0 in (-2 in total)    Constitutional: Oriented to person, place, and time and well-developed,  well-nourished, and in no distress.    Head: Normocephalic.   Musculoskeletal: Normal range of motion. No edema.   Neurological: Alert and oriented to person, place, and time. No muscle weakness.  Gait normal.   Skin: Warm and dry. Not diaphoretic.   Psychiatric: Mood, memory, affect and judgment normal.     Laboratory:   Recent labs reviewed.      Dietitian Assessment: None today    ASSESSMENT/PLAN:  Body mass index is 37.99 kg/m².    Obesity Stage ([place]): 2; Class 2    1. Morbid obesity with BMI of 40.0-44.9, adult ([facility])     2. Weight gain     3. Diabetes mellitus due to underlying condition, uncontrolled, with hyperglycemia ([facility])     4. Stage 3 chronic kidney disease ([facility])     5. Essential hypertension       Although the patient has gained weight since his last visit, overall his hemoglobin A1c, fasting glucose and triglycerides are much improved.  Concerned about slightly worsening renal function.  Could be a side effect of Ozempic so would consider discontinuing and using other agent for glucose control; however, would discuss with Nephrology.    Strongly consider anti-obesity medication for weight gain/to assist with weight loss but patient wishes to discuss with nephrology prior to commencing anti-obesity medication.    Increase activity as he has planned.    The patient and I have discussed at length and agree to the following recommendations, which are all addressing the above diagnoses:    Weight Goal: 3-5% wt loss each month (pt goal weight is 240 lb)  Diet: Consider tracking intake, which he defers  Continue working on lower carb options for meals, including more vegetables  Watch after dinner snacking  Physical Activity: Resume walking, hiking  Risk level for moderate/vigorous exercise program: Low  New Rx: Patient considering phentermine/Topamax but wishes to discuss with nephrology first.  Consider discontinuing Ozempic as can worsen renal function but will defer to nephrology  If Ozempic is  discontinued, will need other agent for fasting glucose control  Side Effects: Patient to review consent form and return it if he would like to start phentermine/topiramate  Behavior change: Increase activity level, more mindful eating  Follow-up: 1 month    Patient's body mass index is 37.99 kg/m². Exercise and nutrition counseling were performed at this visit.

## 2019-11-04 ENCOUNTER — OFFICE VISIT (OUTPATIENT)
Dept: MEDICAL GROUP | Facility: PHYSICIAN GROUP | Age: 54
End: 2019-11-04
Payer: COMMERCIAL

## 2019-11-04 ENCOUNTER — APPOINTMENT (OUTPATIENT)
Dept: HEALTH INFORMATION MANAGEMENT | Facility: MEDICAL CENTER | Age: 54
End: 2019-11-04

## 2019-11-04 VITALS
TEMPERATURE: 98.1 F | HEIGHT: 72 IN | OXYGEN SATURATION: 92 % | SYSTOLIC BLOOD PRESSURE: 130 MMHG | BODY MASS INDEX: 40.04 KG/M2 | DIASTOLIC BLOOD PRESSURE: 80 MMHG | HEART RATE: 91 BPM | WEIGHT: 295.64 LBS

## 2019-11-04 DIAGNOSIS — I10 ESSENTIAL HYPERTENSION: ICD-10-CM

## 2019-11-04 DIAGNOSIS — N18.30 TYPE 2 DIABETES MELLITUS WITH STAGE 3 CHRONIC KIDNEY DISEASE, WITHOUT LONG-TERM CURRENT USE OF INSULIN (HCC): ICD-10-CM

## 2019-11-04 DIAGNOSIS — E11.22 TYPE 2 DIABETES MELLITUS WITH STAGE 3 CHRONIC KIDNEY DISEASE, WITHOUT LONG-TERM CURRENT USE OF INSULIN (HCC): ICD-10-CM

## 2019-11-04 DIAGNOSIS — E66.01 MORBID OBESITY WITH BMI OF 40.0-44.9, ADULT (HCC): ICD-10-CM

## 2019-11-04 DIAGNOSIS — M79.672 FOOT PAIN, BILATERAL: ICD-10-CM

## 2019-11-04 DIAGNOSIS — E55.9 VITAMIN D DEFICIENCY: ICD-10-CM

## 2019-11-04 DIAGNOSIS — E78.5 DYSLIPIDEMIA: ICD-10-CM

## 2019-11-04 DIAGNOSIS — Z11.59 NEED FOR HEPATITIS C SCREENING TEST: ICD-10-CM

## 2019-11-04 DIAGNOSIS — N18.30 STAGE 3 CHRONIC KIDNEY DISEASE (HCC): ICD-10-CM

## 2019-11-04 DIAGNOSIS — M79.671 FOOT PAIN, BILATERAL: ICD-10-CM

## 2019-11-04 PROCEDURE — 99214 OFFICE O/P EST MOD 30 MIN: CPT | Performed by: FAMILY MEDICINE

## 2019-11-04 NOTE — PROGRESS NOTES
Subjective:      Pankaj Limon is a 54 y.o. male who presents with Diabetes      HPI:     The patient is here for follow up on his chronic medical problems.    Type 2 diabetes mellitus with stage 3 chronic kidney disease, without long-term current use of insulin (HCC)  Patient was started on Ozempic once weekly 6 months ago for diabetes mellitus type 2 without any problems or side effects. He is UTD on his retinal screening exam. Blood work was completed prior to this visit.  He notes that his blood sugar levels improved around 120-130 fasting, but they have recently been more around 140-155. This could be related to his recent weight gain as noted below.    Stage 3 chronic kidney disease (HCC)  He has continued to follow up with Dr. Dawson (Nephrology) in regards to his CKD. He continues to avoid nephrotoxic agents.      Essential Hypertension  He takes Lisinopril 10 mg for his hypertension without any side effects. Blood pressure in the office today is within goal.     Dyslipidemia  He has been taking Atorvastatin 20 mg for his dyslipidemia without myalgias or other side effects. He notes that he is only taking this about 4 times per week only instead of every night.  He said he has a hard time taking it regularly at night and is asking if he could take it in the morning.  Blood work was done before this visit.     Morbid obesity with BMI of 40.0-44.9, adult ([facility])  He has been following up [facility]. He was able to drop from 289 to 273 since his last appointment, but he has gained back weight to 295 today.     Vitamin D deficiency  He is not entirely sure of the dosage of his OTC Vitamin D supplementation. He states he takes 4 capsules. He believes this is about 4000 IU's.    Foot pain, bilateral  He has concerns for pain and tightness mostly in his MTP joints. This is exacerbated by walking or hiking long distances. He denies any numbness/tingling. He would like a  referral to Dr. Kendrick (Podiatry) in regards to this issue.  He has seen Dr. Kendrick before for gout.       Past medical history, past surgical history, family history reviewed-no changes    Social history reviewed-no changes    Allergies reviewed-no changes    Medications reviewed-no changes      ROS:  As per the HPI as shown above, the rest are negative.       Objective:     /80 (BP Location: Left arm, Patient Position: Sitting, BP Cuff Size: Adult)   Pulse 91   Temp 36.7 °C (98.1 °F) (Temporal)   Ht 1.829 m (6')   Wt (!) 134.1 kg (295 lb 10.2 oz)   SpO2 92%   BMI 40.10 kg/m²     Physical Exam    Examined alert, awake, oriented, not in distress    Neck-supple, no lymphadenopathy, no thyromegaly  Lungs-clear to auscultation, no rales, no wheezes  Heart-regular rate and rhythm, no murmur  Extremities-no edema, clubbing, cyanosis, strong pedal pulses bilaterally, no evidence of any swelling or tenderness of the MTP joints, no tenderness on palpation of the forefoot plantar aspect, no foot drop, no skin changes, no open sores or wounds     Labs:  Hospital Outpatient Visit on 09/19/2019   Component Date Value Ref Range Status   • Sodium 09/19/2019 139  135 - 145 mmol/L Final   • Potassium 09/19/2019 4.7  3.6 - 5.5 mmol/L Final   • Chloride 09/19/2019 106  96 - 112 mmol/L Final   • Co2 09/19/2019 25  20 - 33 mmol/L Final   • Anion Gap 09/19/2019 8.0  0.0 - 11.9 Final   • Glucose 09/19/2019 160* 65 - 99 mg/dL Final   • Bun 09/19/2019 39* 8 - 22 mg/dL Final   • Creatinine 09/19/2019 1.81* 0.50 - 1.40 mg/dL Final   • Calcium 09/19/2019 9.4  8.5 - 10.5 mg/dL Final   • AST(SGOT) 09/19/2019 17  12 - 45 U/L Final   • ALT(SGPT) 09/19/2019 14  2 - 50 U/L Final   • Alkaline Phosphatase 09/19/2019 55  30 - 99 U/L Final   • Total Bilirubin 09/19/2019 0.5  0.1 - 1.5 mg/dL Final   • Albumin 09/19/2019 4.4  3.2 - 4.9 g/dL Final   • Total Protein 09/19/2019 6.8  6.0 - 8.2 g/dL Final   • Globulin 09/19/2019 2.4  1.9 - 3.5  g/dL Final   • A-G Ratio 09/19/2019 1.8  g/dL Final   • Cholesterol,Tot 09/19/2019 190  100 - 199 mg/dL Final   • Triglycerides 09/19/2019 153* 0 - 149 mg/dL Final   • HDL 09/19/2019 46  >=40 mg/dL Final   • LDL 09/19/2019 113* <100 mg/dL Final   • Glycohemoglobin 09/19/2019 6.9* 0.0 - 5.6 % Final    Comment: Increased risk for diabetes:  5.7 -6.4%  Diabetes:  >6.4%  Glycemic control for adults with diabetes:  <7.0%  The above interpretations are per ADA guidelines.  Diagnosis  of diabetes mellitus on the basis of elevated Hemoglobin A1c  should be confirmed by repeating the Hb A1c test.     • Est Avg Glucose 09/19/2019 151  mg/dL Final    Comment: The eAG calculation is based on the A1c-Derived Daily Glucose  (ADAG) study.  See the ADA's website for additional information.     • Fasting Status 09/19/2019 Fasting   Final   • GFR If  09/19/2019 47* >60 mL/min/1.73 m 2 Final   • GFR If Non  09/19/2019 39* >60 mL/min/1.73 m 2 Final        Assessment/Plan:     1. Type 2 diabetes mellitus with stage 3 chronic kidney disease, without long-term current use of insulin (HCC)  His A1C significantly improved from 10.9 to 6.9.  However, this was two months ago and likely has changed as he has began to put on more weight.  He also noted his fasting blood sugar readings are higher than before.  I advised him to avoid sweets, decrease his carbohydrate intake, exercise regularly and lose weight. He will continue to follow-up with Dr. Reyes for further management of his weight.  Continue the same dose of Ozempic and follow-up in 3 months.  Consider higher dose if the hemoglobin A1c is not at goal.  - Lipid Profile; Future  - Comp Metabolic Panel; Future  - HEMOGLOBIN A1C; Future  - VITAMIN D,25 HYDROXY; Future  - HEP C VIRUS ANTIBODY; Future    2. Stage 3 chronic kidney disease (HCC)  GFR has decreased from 44 to 39.  With regards to his concern about Ozempic causing further decrease in kidney  function, I looked it up on up-to-date and Ozempic is contraindicated for creatinine clearance below 30 and if creatinine clearance is between 30-50 we have to be cautious about giving it.  At this time patient's GFR is at 39, so we will continue his current diabetes management regimen. Labs have been ordered for the next follow up visit. Strongly advised patient to follow-up with Dr. Dawson (Nephrology).  - Lipid Profile; Future  - Comp Metabolic Panel; Future  - HEMOGLOBIN A1C; Future  - VITAMIN D,25 HYDROXY; Future  - HEP C VIRUS ANTIBODY; Future    3. Essential hypertension  Blood pressure is stable and within goal on current medications. We will continue the current dosages. I have advised him to avoid salty foods, exercise regularly, and lose 5-10 pounds in the next 2 to 3 months.  - Lipid Profile; Future  - Comp Metabolic Panel; Future  - HEMOGLOBIN A1C; Future  - VITAMIN D,25 HYDROXY; Future  - HEP C VIRUS ANTIBODY; Future    4. Dyslipidemia  His lipid panel shows a decrease in triglycerides from 172 to 153 and minimally in his LDL from 117 to 113.  LDL goal should be below 100.  I advised the patient to start taking his medication on a daily basis. We discussed that it is most effective to take it at night, but it is better to take it in the morning than not at all. Labs have been ordered for the next follow up visit.   - Lipid Profile; Future  - Comp Metabolic Panel; Future  - HEMOGLOBIN A1C; Future  - VITAMIN D,25 HYDROXY; Future  - HEP C VIRUS ANTIBODY; Future    5. Morbid obesity with BMI of 40.0-44.9, adult (HCC)  Patient's body mass index is 40.1 kg/m². As noted above, he was able to decrease his weight, but he has regained much of it since his last appointment. Exercise and nutrition counseling were performed at this visit. Continue follow-up with Dr. Reyes.   - Lipid Profile; Future  - Comp Metabolic Panel; Future  - HEMOGLOBIN A1C; Future  - VITAMIN D,25 HYDROXY; Future  - HEP C VIRUS  ANTIBODY; Future    6. Vitamin D deficiency  His last vitamin D in April 2019 was low at 26. Advised to continue taking 4000 IUs of supplementation.  - Lipid Profile; Future  - Comp Metabolic Panel; Future  - HEMOGLOBIN A1C; Future  - VITAMIN D,25 HYDROXY; Future  - HEP C VIRUS ANTIBODY; Future    7. Need for hepatitis C screening test  He qualifies for a Hepatitis C screening based on the CDC guidelines. Screening lab work ordered. He agrees to proceed with the test.    - Lipid Profile; Future  - Comp Metabolic Panel; Future  - HEMOGLOBIN A1C; Future  - VITAMIN D,25 HYDROXY; Future  - HEP C VIRUS ANTIBODY; Future    8. Foot pain, bilateral  This is a new issue. Patient referred to Dr. Kendrick (Podiatry) per his request.   - REFERRAL TO PODIATRY       IVarinder (Scribe), am scribing for, and in the presence of, Melly Jimenez MD     Electronically signed by: Varinder Ponce (Scribe), 11/4/2019    Melly FREEMAN MD personally performed the services described in this documentation, as scribed by Varinder Ponce in my presence, and it is both accurate and complete.

## 2019-11-20 ENCOUNTER — APPOINTMENT (OUTPATIENT)
Dept: SLEEP MEDICINE | Facility: MEDICAL CENTER | Age: 54
End: 2019-11-20

## 2019-12-13 ENCOUNTER — OFFICE VISIT (OUTPATIENT)
Dept: NEPHROLOGY | Facility: MEDICAL CENTER | Age: 54
End: 2019-12-13

## 2019-12-13 ENCOUNTER — APPOINTMENT (OUTPATIENT)
Dept: NEPHROLOGY | Facility: MEDICAL CENTER | Age: 54
End: 2019-12-13
Payer: COMMERCIAL

## 2019-12-13 VITALS
TEMPERATURE: 97.1 F | BODY MASS INDEX: 39.82 KG/M2 | HEART RATE: 104 BPM | DIASTOLIC BLOOD PRESSURE: 70 MMHG | OXYGEN SATURATION: 98 % | RESPIRATION RATE: 16 BRPM | WEIGHT: 294 LBS | HEIGHT: 72 IN | SYSTOLIC BLOOD PRESSURE: 118 MMHG

## 2019-12-13 DIAGNOSIS — I10 ESSENTIAL HYPERTENSION: ICD-10-CM

## 2019-12-13 DIAGNOSIS — N18.30 STAGE 3 CHRONIC KIDNEY DISEASE (HCC): ICD-10-CM

## 2019-12-13 DIAGNOSIS — G47.33 OSA ON CPAP: ICD-10-CM

## 2019-12-13 PROCEDURE — 99213 OFFICE O/P EST LOW 20 MIN: CPT | Performed by: INTERNAL MEDICINE

## 2019-12-13 ASSESSMENT — PAIN SCALES - GENERAL: PAINLEVEL: NO PAIN

## 2019-12-13 ASSESSMENT — ENCOUNTER SYMPTOMS
FEVER: 0
PALPITATIONS: 0
CHILLS: 0

## 2019-12-13 NOTE — PROGRESS NOTES
Subjective:      Pankaj Limon is a 54 y.o. male who presents with Follow-Up (essential hypertension) and Chronic Kidney Disease            HPI  54 year old with a history of hypertension and a father with a kidney transplant the father's kidney transplant was felt to be due to diabetes.       Has been doing well. Major issue is that Cr has been 1.6-1.8. Continues on GLP1; seems to be tolerating the low dose. A1c last checked in Sept is6.9.    Review of Systems   Constitutional: Negative for chills and fever.   Cardiovascular: Negative for chest pain and palpitations.   All other systems reviewed and are negative.         Objective:     /70 (BP Location: Right arm, Patient Position: Sitting, BP Cuff Size: Large adult)   Pulse (!) 104   Temp 36.2 °C (97.1 °F)   Resp 16   Ht 1.829 m (6')   Wt (!) 133.4 kg (294 lb)   SpO2 98%   BMI 39.87 kg/m²      Physical Exam  Constitutional:       Appearance: He is well-developed.   HENT:      Head: Normocephalic and atraumatic.   Cardiovascular:      Rate and Rhythm: Normal rate and regular rhythm.   Pulmonary:      Effort: Pulmonary effort is normal.      Breath sounds: Normal breath sounds.   Abdominal:      General: Bowel sounds are normal.      Palpations: Abdomen is soft.   Musculoskeletal:         General: No deformity.   Skin:     General: Skin is warm and dry.   Neurological:      General: No focal deficit present.      Mental Status: He is alert and oriented to person, place, and time.   Psychiatric:         Mood and Affect: Mood normal.         Behavior: Behavior normal.                 Assessment/Plan:       1. Stage 3 chronic kidney disease (HCC)  Cr 1.81; but will check repeat values, and continue GLP1, expect this will have benefit, would stop of Cr up or protein up significantly.    2. Essential hypertension  BP controlled    3. YOVANA on CPAP  On CPAP, though inconsistent

## 2020-01-14 ENCOUNTER — HOSPITAL ENCOUNTER (OUTPATIENT)
Dept: LAB | Facility: MEDICAL CENTER | Age: 55
End: 2020-01-14
Attending: ANESTHESIOLOGY
Payer: COMMERCIAL

## 2020-01-14 LAB
ANION GAP SERPL CALC-SCNC: 12 MMOL/L (ref 0–11.9)
BUN SERPL-MCNC: 34 MG/DL (ref 8–22)
CALCIUM SERPL-MCNC: 9.8 MG/DL (ref 8.5–10.5)
CHLORIDE SERPL-SCNC: 105 MMOL/L (ref 96–112)
CO2 SERPL-SCNC: 23 MMOL/L (ref 20–33)
CREAT SERPL-MCNC: 1.68 MG/DL (ref 0.5–1.4)
GLUCOSE SERPL-MCNC: 112 MG/DL (ref 65–99)
POTASSIUM SERPL-SCNC: 4 MMOL/L (ref 3.6–5.5)
SODIUM SERPL-SCNC: 140 MMOL/L (ref 135–145)

## 2020-01-14 PROCEDURE — 36415 COLL VENOUS BLD VENIPUNCTURE: CPT

## 2020-01-14 PROCEDURE — 80048 BASIC METABOLIC PNL TOTAL CA: CPT

## 2020-02-16 NOTE — TELEPHONE ENCOUNTER
Patient aware we did not receive all of the results to the labs we ordered.  I have mailed the CMP result that we did have to patient's home address per request.    Pt watching TV, denies any needs at this time. Pt's wife remains at bedside, both updated on POC.

## 2020-03-15 DIAGNOSIS — E78.5 DYSLIPIDEMIA: ICD-10-CM

## 2020-03-16 RX ORDER — ATORVASTATIN CALCIUM 20 MG/1
TABLET, FILM COATED ORAL
Qty: 90 TAB | Refills: 1 | Status: SHIPPED | OUTPATIENT
Start: 2020-03-16 | End: 2020-11-16

## 2020-04-08 ENCOUNTER — OFFICE VISIT (OUTPATIENT)
Dept: MEDICAL GROUP | Facility: PHYSICIAN GROUP | Age: 55
End: 2020-04-08
Payer: COMMERCIAL

## 2020-04-08 VITALS — TEMPERATURE: 97.9 F | BODY MASS INDEX: 38.19 KG/M2 | HEIGHT: 72 IN | WEIGHT: 282 LBS

## 2020-04-08 DIAGNOSIS — I10 ESSENTIAL HYPERTENSION: ICD-10-CM

## 2020-04-08 DIAGNOSIS — N18.30 TYPE 2 DIABETES MELLITUS WITH STAGE 3 CHRONIC KIDNEY DISEASE, WITHOUT LONG-TERM CURRENT USE OF INSULIN (HCC): ICD-10-CM

## 2020-04-08 DIAGNOSIS — E11.22 TYPE 2 DIABETES MELLITUS WITH STAGE 3 CHRONIC KIDNEY DISEASE, WITHOUT LONG-TERM CURRENT USE OF INSULIN (HCC): ICD-10-CM

## 2020-04-08 DIAGNOSIS — N18.30 STAGE 3 CHRONIC KIDNEY DISEASE: ICD-10-CM

## 2020-04-08 DIAGNOSIS — E78.5 DYSLIPIDEMIA: ICD-10-CM

## 2020-04-08 DIAGNOSIS — G47.33 OSA ON CPAP: ICD-10-CM

## 2020-04-08 PROCEDURE — 99441 PR PHYSICIAN TELEPHONE EVALUATION 5-10 MIN: CPT | Mod: CR | Performed by: FAMILY MEDICINE

## 2020-04-08 ASSESSMENT — FIBROSIS 4 INDEX: FIB4 SCORE: 1.23

## 2020-04-08 NOTE — PROGRESS NOTES
Telephone Appointment Visit   As a means of avoiding spread of COVID-19, this visit is being conducted by telephone. This telephone visit was initiated by the patient and they verbally consented.    Time at start of call: 4:10 PM    Reason for Call:  Follow up of chronic medical problems including diabetes, CKD, HTN, dyslipidemia, YOVANA    Patient Comments / History:   Patient is following up on his chronic medical problems.     Type 2 diabetes mellitus with stage 3 chronic kidney disease, without long-term current use of insulin (HCC)  He continues to inject ozempic 0.25 mg every 7 days for diabetes without any problems or side effects. He did not complete his blood work prior to this encounter. Last A1C was 6.9 in September 2019. Patient states his home blood sugars have been trending around 130-150 before breakfast. Patient states his home blood sugars have not been as good as they should be because his activity has been limited lately due to recently having toe surgery. Additionally, the gym that he goes to has closed down due to the COVID-19 situation. Prior to the toe surgery, he was walking 2-3 miles per day and biking 5-6 miles every 3 days. He plans to exercise more later this spring. Of note we stayed away from metformin because of chronic kidney disease.  We did not give him sulfonylurea because of potential for weight gain and patient already has morbid obesity.  His nephrologist agrees with the choice of GLP-1 agonist.    Stage 3 chronic kidney disease (HCC)  He follows up with his nephrologist Dr. Dawson with the last visit in December 2019.  Creatinine ranges from 1.6-1.8.  GFR ranges from 39-44.  He avoids nephrotoxic agents.  He will follow-up with Dr. Dawson in May.    Essential hypertension  He takes lisinopril 10 mg 1 tablet daily without side effects.  Blood pressure according to him runs from 122-130/70-75    Dyslipidemia  He has been taking atorvastatin 20 mg as prescribed for his  dyslipidemia without myalgias or other side effects. He has not completed his blood work yet prior to this encounter. Patient states he has not been exercising much lately due to having recent toe surgery.  The last lipid panel was in September 2019 and total cholesterol was 190, triglycerides 153, HDL 46 and .    YOVANA on CPAP  Chronic in nature and stable. Patient continues to use his CPAP. No reports of any issues or new associated symptoms regarding his YOVANA.      Labs / Images Reviewed   No recent labs to review today, as patient has not completed them yet prior to this encounter.     Assessment and Plan:     1. Type 2 diabetes mellitus with stage 3 chronic kidney disease, without long-term current use of insulin (HCC)  - Patient did not complete his blood work prior to this encounter. Last A1C was 6.9 in September 2019. Patient states his home blood sugars have been trending around 130-150 before breakfast. He will continue the current dosage of ozempic. Advised him to get his labs done as soon as possible. I advised him to make diet changes to improve his glucose levels. Advised him to avoid sweets, decrease his carbohydrate intake, and keep a healthy weight. He plans to exercise more later in the spring.     2. Stage 3 chronic kidney disease (HCC)  -Stable kidney function. Avoid nephrotoxic agents.  He will continue plan of care and follow ups with Dr. Dawson (nephrology). He will keep his appointment with them this coming May. Will continue to monitor.    3. Essential hypertension  -  Stable at this time. Patient states his blood pressures lately have been trending around 130-140/70-75. We will continue the current dosages. I have advised him to avoid salty foods and exercise regularly.     4. Dyslipidemia  - Patient did not complete his labs prior to this encounter. Advised him to get them done as soon as possible.  Continue atorvastatin.  I have advised the patient to increase his exercise regimen  and avoid fatty foods.    5. YOVANA on CPAP  - Patient has been stable with current management. We will make no changes for now. He will continue using his CPAP.     Follow-up: Follow-up in 4 months.    Time at end of call: 4:20 PM   Total Time Spent: 5-10 minutes    JODI Cruz Jason P. Villanueva (Scribe), am scribing for, and in the presence of, Melly Jimenez MD     Electronically signed by: Rm Greer (Scribe), 4/8/2020    IMelly MD personally performed the services described in this documentation, as scribed by Rm Greer in my presence, and it is both accurate and complete.

## 2020-05-05 ENCOUNTER — APPOINTMENT (OUTPATIENT)
Dept: NEPHROLOGY | Facility: MEDICAL CENTER | Age: 55
End: 2020-05-05

## 2020-05-28 ENCOUNTER — SLEEP CENTER VISIT (OUTPATIENT)
Dept: SLEEP MEDICINE | Facility: MEDICAL CENTER | Age: 55
End: 2020-05-28
Payer: COMMERCIAL

## 2020-05-28 VITALS
OXYGEN SATURATION: 94 % | SYSTOLIC BLOOD PRESSURE: 124 MMHG | WEIGHT: 294 LBS | RESPIRATION RATE: 14 BRPM | DIASTOLIC BLOOD PRESSURE: 82 MMHG | BODY MASS INDEX: 39.82 KG/M2 | HEART RATE: 82 BPM | HEIGHT: 72 IN

## 2020-05-28 DIAGNOSIS — G47.33 OSA (OBSTRUCTIVE SLEEP APNEA): ICD-10-CM

## 2020-05-28 PROCEDURE — 99213 OFFICE O/P EST LOW 20 MIN: CPT | Performed by: FAMILY MEDICINE

## 2020-05-28 ASSESSMENT — FIBROSIS 4 INDEX: FIB4 SCORE: 1.23

## 2020-05-28 NOTE — PROGRESS NOTES
[facility] Follow Up Note     Date: 5/28/2020 / Time: 1:05 PM    Patient ID:   Name:             Pankaj Limon     YOB: 1965  Age:                 54 y.o.  male   MRN:               [id number]      Thank you for requesting a sleep medicine consultation on Pankaj Limon at the sleep center. He presents today with the chief complaints of YOVANA follow up.     HISTORY OF PRESENT ILLNESS:       Pt is currently suppose to be on ACPAP 5-15 cm however he has not been on therapy since 01/2020. His CPAP was repossessed by the DME due to change in his insurance. He goes to sleep around 9-10 pm and wakes up around 5:30 am. He is getting about 5-6 hrs of sleep on a good night and about 2-3 hr of sleep on a bad night. The bad nights are rare per week. Overall, he doesnot finds his sleep refreshing. He does not take regular naps. The symptoms of YOVANA has relapsed since hea been off of the CPAP. Denies any other sleep disorder    SLEEP HISTORY   He was previously seen by . Pt was diagnosed with YOVANA on 5/25/10 which showed sever YOVANA with AHI of 94/hr. He had a titration on 8/17/10 and the best tolerated was CPAP 9 with improved AHI of 14/hr. He was on CPAP 11 cm       REVIEW OF SYSTEMS:       Constitutional: Denies fevers, Denies weight changes  Eyes: Denies changes in vision, no eye pain  Ears/Nose/Throat/Mouth: Denies nasal congestion or sore throat   Cardiovascular: Denies chest pain or palpitations   Respiratory: Denies shortness of breath , Denies cough  Gastrointestinal/Hepatic: Denies abdominal pain, nausea,   Skin/Breast: Denies rash,   Neurological: Denies headache, confusion, memory loss or focal weakness/parasthesias  Psychiatric: denies mood disorder   Sleep: + snoring     Comprehensive review of systems form is reviewed with the patient and is attached in the EMR.     PMH:  has a past medical history of Chickenpox, Diabetes (HCC), ED (erectile dysfunction), HTN (hypertension),  Obesity, YOVANA on CPAP, and Sleep apnea.  MEDS:   Current Outpatient Medications:   •  atorvastatin (LIPITOR) 20 MG Tab, TAKE 1 TABLET BY MOUTH EVERY DAY AT BEDTIME, Disp: 90 Tab, Rfl: 1  •  sildenafil citrate (VIAGRA) 100 MG tablet, Take 1 Tab by mouth as needed for Erectile Dysfunction., Disp: 90 Tab, Rfl: 1  •  lisinopril (PRINIVIL) 10 MG Tab, Take 1 Tab by mouth every day. TAKE ONE TABLET BY MOUTH ONCE DAILY, Disp: 90 Tab, Rfl: 3  •  Semaglutide (OZEMPIC) 0.25 or 0.5 MG/DOSE Solution Pen-injector, Inject 0.25 mg as instructed every 7 days., Disp: 1 PEN, Rfl: 5  •  colchicine (COLCRYS) 0.6 MG Tab, Take 2 tabs by mouth at first onset of gout and take another tablet after 1 hour., Disp: 30 Tab, Rfl: 1  •  MER ASPIRIN PO, Take  by mouth., Disp: , Rfl:   •  B Complex Vitamins (VITAMIN-B COMPLEX PO), Take  by mouth., Disp: , Rfl:   •  Cholecalciferol (VITAMIN D) 2000 UNIT Tab, Take 6,000 Units by mouth every day., Disp: , Rfl:   •  Blood Glucose Monitoring Suppl Device, Meter: Dispense Device of Insurance Preference. Sig. Use as directed for blood sugar monitoring. #1. NR., Disp: 1 Device, Rfl: 0  •  Blood Glucose Test Strips, Test strips order: Test strips for glucometer. Sig: use once daily before breakfast., Disp: 50 Strip, Rfl: 5  •  Blood Glucose Test Strips, Test strips order: Test strips for glucometer. Sig: use  Once daily before breakfast., Disp: 50 Strip, Rfl: 5  ALLERGIES: No Known Allergies  SURGHX:   Past Surgical History:   Procedure Laterality Date   • COLONOSCOPY WITH POLYP  03/28/2019    Polyp ascending colon, polyp transverse colon-3 adenomatous polyps, mild diverticulosis sigmoid colon   • COLONOSCOPY WITH POLYP  2013    polyp - adenomatous- [facility]   • ARTHROSCOPY, KNEE      left knee    • OTHER      repair of perforated nasal septum   • SINUSCOPE       SOCHX:  reports that he has never smoked. He has never used smokeless tobacco. He reports current alcohol use. He reports that he does not use  drugs..  FH:   Family History   Problem Relation Age of Onset   • Diabetes Father    • Hyperlipidemia Father    • Stroke Father    • Sleep Apnea Neg Hx          Physical Exam:  Vitals/ General Appearance:   Weight/BMI: Body mass index is 39.87 kg/m².  /82 (BP Location: Left arm, Patient Position: Sitting, BP Cuff Size: Adult)   Pulse 82   Resp 14   Ht 1.829 m (6')   Wt (!) 133.4 kg (294 lb)   SpO2 94%   Vitals:    05/28/20 1255   BP: 124/82   BP Location: Left arm   Patient Position: Sitting   BP Cuff Size: Adult   Pulse: 82   Resp: 14   SpO2: 94%   Weight: (!) 133.4 kg (294 lb)   Height: 1.829 m (6')       Pt. is alert and oriented to time, place and person. Cooperative and in no apparent distress.       Constitutional: Alert, no distress, well-groomed.  Skin: No rashes in visible areas.  Eye: Round. Conjunctiva clear, lids normal. No icterus.   Neck: No masses, no thyromegaly. Moves freely without pain.  -. Lungs auscultation: B/L good air entry, vesicular breath sounds, no adventitious sounds  -. Heart auscultation: 1st and 2nd heart sounds normal, regular rhythm. No appreciable murmur.  - Extremities: no clubbing, no pedal edema.  - NEUROLOGICAL EXAMINATION: On neurological exam, the patient was alert and oriented x3. speech was clear and fluent without dysarthria.  ASSESSMENT AND PLAN     1. Sleep Apnea . He is urged to avoid supine sleep, weight gain and alcoholic beverages since all of these can worsen sleep apnea. He is cautioned against drowsy driving. If He feels sleepy while driving, He must pull over for a break/nap, rather than persist on the road, in the interest of He own safety and that of others on the road.   Plan   - HST is ordered to reestablish the diagnosis. He has relapse of symptoms w/o the CPAP   - New ACPAP 6-15 cm will be ordered after his HST   - F/u in 8-10 weeks to assess the efficiacy of recommended pressure    - compliance download was reviewed and discussed with the pt   -  compliance was reinforced     2. Regarding treatment of other past medical problems and general health maintenance,  He is urged to follow up with PCP.

## 2020-06-01 RX ORDER — SEMAGLUTIDE 1.34 MG/ML
INJECTION, SOLUTION SUBCUTANEOUS
Qty: 2 ML | Refills: 5 | Status: SHIPPED | OUTPATIENT
Start: 2020-06-01 | End: 2020-12-09 | Stop reason: SDUPTHER

## 2020-06-29 ENCOUNTER — HOME STUDY (OUTPATIENT)
Dept: SLEEP MEDICINE | Facility: MEDICAL CENTER | Age: 55
End: 2020-06-29
Attending: FAMILY MEDICINE
Payer: COMMERCIAL

## 2020-06-29 DIAGNOSIS — G47.33 OSA (OBSTRUCTIVE SLEEP APNEA): ICD-10-CM

## 2020-06-29 PROCEDURE — 95806 SLEEP STUDY UNATT&RESP EFFT: CPT | Performed by: FAMILY MEDICINE

## 2020-07-01 ENCOUNTER — PATIENT MESSAGE (OUTPATIENT)
Dept: SLEEP MEDICINE | Facility: MEDICAL CENTER | Age: 55
End: 2020-07-01

## 2020-07-01 DIAGNOSIS — G47.33 OSA (OBSTRUCTIVE SLEEP APNEA): ICD-10-CM

## 2020-07-01 NOTE — PROCEDURES
DIAGNOSTIC HOME SLEEP TEST (HST) REPORT       PATIENT ID:  NAME:  Pankaj Limon  MRN:               [id number]  YOB: 1965  DATE OF STUDY: 6/29/2020      Impression:     This study shows evidence of:     1.Severe obstructive sleep apnea with  Respiratory Event Index (MADDI) of 38.1 per hour and worse in supine sleep with MADDI at 49.7. These findings are based on the recording time (flow evaluation time).     2. Nocturnal hypoxia: O2 Sat. evangelista was 74%, avg O2 was 91 % and baseline O2 at 100 %. O2 sat was below 89% for 92 min of the flow evaluation time. Avg HR 73 BPM.      TECHNICAL DESCRIPTION:  ResMed Device used was a type-III home study device. Home sleep study recording included: Airflow recording by nasal pressure transducer; Respiratory Effort by abdominal Respiratory Bands; O2 by finger oximetry. A position sensor and a snore channel was also used.    Scoring Criteria: A modification of the the AASM Manual for the Scoring of Sleep and Associated Events. Obstructive apnea was scored by cessation of airflow for at least 10 seconds with continuing respiratory effort.  Central apnea was scored by cessation of airflow for at least 10 seconds with no effort.  Hypopnea was scored by a 30% or more reduction in airflow for at least 10 seconds accompanied by an arterial oxygen desaturation of 3% or more.  (For Medicare patients, hypopneas were scored by a 30% or more reduction in airflow for at least 10 seconds accompanied by an arterial oxygen saturation of 4% or more, as required by their insurance, CMS.        General sleep summary: . Total recording time is 459 minutes and total flow evaluation time is 449 minutes. The patient spent 224 minutes in the supine position  Respiratory events:    Apneas: 195 (Obstructive apnea index 23/hr, Central apnea index 3.1 /hr, mixed 0 /hour)  Hypopneas: 90    Recommendations:    1. CPAP titration study vs Auto CPAP trial .   2.   In general patients with  sleep apnea are advised to avoid alcohol and sedatives and to not operate a motor vehicle while drowsy. In some cases alternative treatment options may prove effective in resolving sleep apnea in these options include upper airway surgery, the use of a dental orthotic or weight loss and positional therapy. Clinical correlation is required.         Anel Branch MD

## 2020-08-03 NOTE — PATIENT COMMUNICATION
Called the pt to ask if it was okay for the order for the new cpap machine can be fax to preferred because they are in network with his insurance and pt agreed. I provided pt with preferred contact information and advise him to contact them if he does not hear from them within 2-3 weeks. The pt is already schedule for a [facility] for compliance on 11/17/2020 with Dr. Branch.

## 2020-08-14 ENCOUNTER — HOSPITAL ENCOUNTER (OUTPATIENT)
Dept: LAB | Facility: MEDICAL CENTER | Age: 55
End: 2020-08-14
Attending: FAMILY MEDICINE
Payer: COMMERCIAL

## 2020-08-14 ENCOUNTER — HOSPITAL ENCOUNTER (OUTPATIENT)
Dept: LAB | Facility: MEDICAL CENTER | Age: 55
End: 2020-08-14
Attending: INTERNAL MEDICINE
Payer: COMMERCIAL

## 2020-08-14 DIAGNOSIS — E11.22 TYPE 2 DIABETES MELLITUS WITH STAGE 3 CHRONIC KIDNEY DISEASE, WITHOUT LONG-TERM CURRENT USE OF INSULIN (HCC): ICD-10-CM

## 2020-08-14 DIAGNOSIS — E66.01 MORBID OBESITY WITH BMI OF 40.0-44.9, ADULT (HCC): ICD-10-CM

## 2020-08-14 DIAGNOSIS — E55.9 VITAMIN D DEFICIENCY: ICD-10-CM

## 2020-08-14 DIAGNOSIS — I10 ESSENTIAL HYPERTENSION: ICD-10-CM

## 2020-08-14 DIAGNOSIS — E78.5 DYSLIPIDEMIA: ICD-10-CM

## 2020-08-14 DIAGNOSIS — N18.30 STAGE 3 CHRONIC KIDNEY DISEASE: ICD-10-CM

## 2020-08-14 DIAGNOSIS — N18.30 TYPE 2 DIABETES MELLITUS WITH STAGE 3 CHRONIC KIDNEY DISEASE, WITHOUT LONG-TERM CURRENT USE OF INSULIN (HCC): ICD-10-CM

## 2020-08-14 DIAGNOSIS — Z11.59 NEED FOR HEPATITIS C SCREENING TEST: ICD-10-CM

## 2020-08-14 LAB
25(OH)D3 SERPL-MCNC: 39 NG/ML (ref 30–100)
ALBUMIN SERPL BCP-MCNC: 4.2 G/DL (ref 3.2–4.9)
ALBUMIN/GLOB SERPL: 1.6 G/DL
ALP SERPL-CCNC: 76 U/L (ref 30–99)
ALT SERPL-CCNC: 16 U/L (ref 2–50)
ANION GAP SERPL CALC-SCNC: 13 MMOL/L (ref 7–16)
ANION GAP SERPL CALC-SCNC: 13 MMOL/L (ref 7–16)
AST SERPL-CCNC: 17 U/L (ref 12–45)
BILIRUB SERPL-MCNC: 0.4 MG/DL (ref 0.1–1.5)
BUN SERPL-MCNC: 28 MG/DL (ref 8–22)
BUN SERPL-MCNC: 28 MG/DL (ref 8–22)
CALCIUM SERPL-MCNC: 9.6 MG/DL (ref 8.5–10.5)
CALCIUM SERPL-MCNC: 9.6 MG/DL (ref 8.5–10.5)
CHLORIDE SERPL-SCNC: 102 MMOL/L (ref 96–112)
CHLORIDE SERPL-SCNC: 103 MMOL/L (ref 96–112)
CHOLEST SERPL-MCNC: 151 MG/DL (ref 100–199)
CO2 SERPL-SCNC: 17 MMOL/L (ref 20–33)
CO2 SERPL-SCNC: 19 MMOL/L (ref 20–33)
CREAT SERPL-MCNC: 1.54 MG/DL (ref 0.5–1.4)
CREAT SERPL-MCNC: 1.55 MG/DL (ref 0.5–1.4)
FASTING STATUS PATIENT QL REPORTED: NORMAL
FASTING STATUS PATIENT QL REPORTED: NORMAL
GLOBULIN SER CALC-MCNC: 2.6 G/DL (ref 1.9–3.5)
GLUCOSE SERPL-MCNC: 228 MG/DL (ref 65–99)
GLUCOSE SERPL-MCNC: 230 MG/DL (ref 65–99)
HCV AB SER QL: NORMAL
HDLC SERPL-MCNC: 47 MG/DL
LDLC SERPL CALC-MCNC: 77 MG/DL
POTASSIUM SERPL-SCNC: 4.8 MMOL/L (ref 3.6–5.5)
POTASSIUM SERPL-SCNC: 4.9 MMOL/L (ref 3.6–5.5)
PROT SERPL-MCNC: 6.8 G/DL (ref 6–8.2)
PTH-INTACT SERPL-MCNC: 34.7 PG/ML (ref 14–72)
SODIUM SERPL-SCNC: 132 MMOL/L (ref 135–145)
SODIUM SERPL-SCNC: 135 MMOL/L (ref 135–145)
TRIGL SERPL-MCNC: 135 MG/DL (ref 0–149)

## 2020-08-14 PROCEDURE — 82306 VITAMIN D 25 HYDROXY: CPT

## 2020-08-14 PROCEDURE — 83970 ASSAY OF PARATHORMONE: CPT

## 2020-08-14 PROCEDURE — 80061 LIPID PANEL: CPT

## 2020-08-14 PROCEDURE — 80053 COMPREHEN METABOLIC PANEL: CPT

## 2020-08-14 PROCEDURE — 80048 BASIC METABOLIC PNL TOTAL CA: CPT

## 2020-08-14 PROCEDURE — 86803 HEPATITIS C AB TEST: CPT

## 2020-08-14 PROCEDURE — 36415 COLL VENOUS BLD VENIPUNCTURE: CPT

## 2020-08-18 ENCOUNTER — HOSPITAL ENCOUNTER (OUTPATIENT)
Dept: LAB | Facility: MEDICAL CENTER | Age: 55
End: 2020-08-18
Attending: FAMILY MEDICINE
Payer: COMMERCIAL

## 2020-08-18 ENCOUNTER — OFFICE VISIT (OUTPATIENT)
Dept: NEPHROLOGY | Facility: MEDICAL CENTER | Age: 55
End: 2020-08-18
Payer: COMMERCIAL

## 2020-08-18 VITALS
TEMPERATURE: 97.2 F | RESPIRATION RATE: 16 BRPM | DIASTOLIC BLOOD PRESSURE: 70 MMHG | WEIGHT: 284 LBS | BODY MASS INDEX: 38.47 KG/M2 | SYSTOLIC BLOOD PRESSURE: 118 MMHG | HEIGHT: 72 IN | HEART RATE: 91 BPM | OXYGEN SATURATION: 97 %

## 2020-08-18 DIAGNOSIS — I10 ESSENTIAL HYPERTENSION: ICD-10-CM

## 2020-08-18 DIAGNOSIS — N18.30 STAGE 3 CHRONIC KIDNEY DISEASE: ICD-10-CM

## 2020-08-18 LAB
EST. AVERAGE GLUCOSE BLD GHB EST-MCNC: 229 MG/DL
HBA1C MFR BLD: 9.6 % (ref 0–5.6)

## 2020-08-18 PROCEDURE — 83036 HEMOGLOBIN GLYCOSYLATED A1C: CPT

## 2020-08-18 PROCEDURE — 99214 OFFICE O/P EST MOD 30 MIN: CPT | Performed by: INTERNAL MEDICINE

## 2020-08-18 ASSESSMENT — ENCOUNTER SYMPTOMS
VOMITING: 0
COUGH: 0
HYPERTENSION: 1
FEVER: 0
CHILLS: 0
NAUSEA: 0
SHORTNESS OF BREATH: 0

## 2020-08-18 ASSESSMENT — FIBROSIS 4 INDEX: FIB4 SCORE: 1.15

## 2020-08-18 NOTE — PROGRESS NOTES
Subjective:      Pankaj Limon is a 54 y.o. male who presents with Chronic Kidney Disease and Hypertension            Chronic Kidney Disease  This is a chronic problem. The current episode started more than 1 year ago. The problem occurs constantly. The problem has been unchanged. Pertinent negatives include no chest pain, chills, coughing, fever, nausea, urinary symptoms or vomiting.   Hypertension  This is a chronic problem. The current episode started more than 1 year ago. The problem is unchanged. The problem is controlled. Pertinent negatives include no chest pain, malaise/fatigue or shortness of breath. Risk factors for coronary artery disease include male gender, diabetes mellitus and obesity. Past treatments include ACE inhibitors. The current treatment provides significant improvement. There are no compliance problems.  Hypertensive end-organ damage includes kidney disease. Identifiable causes of hypertension include chronic renal disease.       Review of Systems   Constitutional: Negative for chills, fever and malaise/fatigue.   Respiratory: Negative for cough and shortness of breath.    Cardiovascular: Negative for chest pain and leg swelling.   Gastrointestinal: Negative for nausea and vomiting.   Genitourinary: Negative for dysuria, frequency and urgency.          Objective:     /70 (BP Location: Right arm, Patient Position: Sitting, BP Cuff Size: Large adult)   Pulse 91   Temp 36.2 °C (97.2 °F) (Temporal)   Resp 16   Ht 1.829 m (6')   Wt (!) 128.8 kg (284 lb)   SpO2 97%   BMI 38.52 kg/m²      Physical Exam  Vitals signs and nursing note reviewed.   Constitutional:       General: He is not in acute distress.     Appearance: He is not ill-appearing.   HENT:      Head: Normocephalic and atraumatic.      Right Ear: External ear normal.      Left Ear: External ear normal.      Nose: Nose normal.   Eyes:      General:         Right eye: No discharge.         Left eye: No discharge.       Conjunctiva/sclera: Conjunctivae normal.   Cardiovascular:      Rate and Rhythm: Normal rate and regular rhythm.      Heart sounds: No murmur.   Pulmonary:      Effort: Pulmonary effort is normal. No respiratory distress.      Breath sounds: Normal breath sounds. No wheezing.   Musculoskeletal:         General: No tenderness or deformity.      Right lower leg: No edema.      Left lower leg: No edema.   Skin:     General: Skin is warm.   Neurological:      General: No focal deficit present.      Mental Status: He is alert and oriented to person, place, and time.   Psychiatric:         Mood and Affect: Mood normal.         Behavior: Behavior normal.       Past Medical History:   Diagnosis Date   • Chickenpox    • Diabetes (HCC)    • ED (erectile dysfunction)    • HTN (hypertension)    • Obesity    • YOVANA on CPAP    • Sleep apnea      Social History     Socioeconomic History   • Marital status:      Spouse name: Not on file   • Number of children: 0   • Years of education: Not on file   • Highest education level: Not on file   Occupational History   • Occupation: controller - [employer]   Social Needs   • Financial resource strain: Not on file   • Food insecurity     Worry: Not on file     Inability: Not on file   • Transportation needs     Medical: Not on file     Non-medical: Not on file   Tobacco Use   • Smoking status: Never Smoker   • Smokeless tobacco: Never Used   Substance and Sexual Activity   • Alcohol use: Yes     Alcohol/week: 0.0 oz     Comment: rare    • Drug use: No   • Sexual activity: Yes     Partners: Female   Lifestyle   • Physical activity     Days per week: Not on file     Minutes per session: Not on file   • Stress: Not on file   Relationships   • Social connections     Talks on phone: Not on file     Gets together: Not on file     Attends [employer] service: Not on file     Active member of club or organization: Not on file     Attends meetings of clubs or organizations: Not on file      Relationship status: Not on file   • Intimate partner violence     Fear of current or ex partner: Not on file     Emotionally abused: Not on file     Physically abused: Not on file     Forced sexual activity: Not on file   Other Topics Concern   • Not on file   Social History Narrative   • Not on file     Family History   Problem Relation Age of Onset   • Diabetes Father    • Hyperlipidemia Father    • Stroke Father    • Sleep Apnea Neg Hx      Recent Labs     09/19/19  0638 01/14/20  1457 08/14/20  1055 08/14/20  1056   ALBUMIN 4.4  --  4.2  --    HDL 46  --  47  --    TRIGLYCERIDE 153*  --  135  --    SODIUM 139 140 135 132*   POTASSIUM 4.7 4.0 4.8 4.9   CHLORIDE 106 105 103 102   CO2 25 23 19* 17*   BUN 39* 34* 28* 28*   CREATININE 1.81* 1.68* 1.55* 1.54*                 Assessment/Plan:        1. Stage 3 chronic kidney disease (HCC)  Stable  No uremic symptoms  Renal dose of medication  Avoid nephrotoxins  Continue same medication regimen      2. Essential hypertension  Controlled  Continue same medication regimen  Continue low-sodium diet    3. DM  Continue to optimize diabetes control for hemoglobin A1c below 7%

## 2020-08-19 ENCOUNTER — TELEMEDICINE (OUTPATIENT)
Dept: MEDICAL GROUP | Facility: PHYSICIAN GROUP | Age: 55
End: 2020-08-19
Payer: COMMERCIAL

## 2020-08-19 VITALS — WEIGHT: 293 LBS | HEIGHT: 72 IN | OXYGEN SATURATION: 91 % | TEMPERATURE: 97.8 F | BODY MASS INDEX: 39.68 KG/M2

## 2020-08-19 DIAGNOSIS — E78.5 DYSLIPIDEMIA: ICD-10-CM

## 2020-08-19 DIAGNOSIS — E55.9 VITAMIN D DEFICIENCY: ICD-10-CM

## 2020-08-19 DIAGNOSIS — N18.30 STAGE 3 CHRONIC KIDNEY DISEASE: ICD-10-CM

## 2020-08-19 DIAGNOSIS — E11.22 TYPE 2 DIABETES MELLITUS WITH STAGE 3 CHRONIC KIDNEY DISEASE, WITHOUT LONG-TERM CURRENT USE OF INSULIN (HCC): ICD-10-CM

## 2020-08-19 DIAGNOSIS — Z12.5 SCREENING FOR PROSTATE CANCER: ICD-10-CM

## 2020-08-19 DIAGNOSIS — N18.30 TYPE 2 DIABETES MELLITUS WITH STAGE 3 CHRONIC KIDNEY DISEASE, WITHOUT LONG-TERM CURRENT USE OF INSULIN (HCC): ICD-10-CM

## 2020-08-19 DIAGNOSIS — I10 ESSENTIAL HYPERTENSION: ICD-10-CM

## 2020-08-19 PROCEDURE — 99214 OFFICE O/P EST MOD 30 MIN: CPT | Mod: 95,CR | Performed by: FAMILY MEDICINE

## 2020-08-19 RX ORDER — LISINOPRIL 10 MG/1
10 TABLET ORAL DAILY
Qty: 90 TAB | Refills: 3 | Status: SHIPPED | OUTPATIENT
Start: 2020-08-19 | End: 2021-07-25

## 2020-08-19 ASSESSMENT — FIBROSIS 4 INDEX: FIB4 SCORE: 1.15

## 2020-08-19 NOTE — PROGRESS NOTES
Telemedicine Video Visit: Established Patient   This Remote Face to Face encounter was conducted via [employer]. Given the importance of social distancing and other strategies recommended to reduce the risk of COVID-19 transmission, I am providing medical care to this patient via audio/video visit in place of an in person visit at the request of the patient. Verbal consent to telehealth, risks, benefits, and consequences were discussed. Patient retains the right to withdraw at any time. All existing confidentiality protections apply. The patient has access to all transmitted medical information. No dissemination of any patient images or information to other entities without further written consent.  Subjective:     Chief Complaint   Patient presents with   • Diabetes   • Other     growth on neck       Pankaj Limon is a 54 y.o. male presenting for evaluation and management of:    Patient is here for follow-up of his medical problems.  His last visit with me was in April 2020.    In terms of his diabetes mellitus with stage III chronic kidney disease, he continues to take Ozempic 0.25 mg once a week.  He denies any side effects from the medication.  He lost about 10 pounds when he got on this medication but then again the weight again when the coronavirus pandemic hit.  He said he has not been exercising and has not been watching his diet.  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  He did blood work before this visit.    He continues to follow-up with the nephrologist for his stage III chronic kidney disease.  His most recent blood work this month came back improvement of his kidney function.  He avoids nephrotoxic agents.  His nephrologist told him to follow-up on a yearly basis.    In terms of his hypertension, he said his blood pressure has been running from 115-125/60-70.  His nephrologist has increased the dose of his lisinopril to 10 mg daily.  He denies any side effects.  He is wondering if his  blood pressure is too low.  He however denies any dizziness or lightheadedness.    He continues to take atorvastatin for dyslipidemia without myalgias.    He continues to take vitamin D supplementation for vitamin D deficiency.    He has skin growth on the right side of his neck that he wants me to look at.    Past medical history, past surgical history, family history reviewed-no changes    Social history reviewed-no changes    Allergies reviewed-no changes    Medications reviewed-no changes    ROS  Denies any recent fevers or chills. No nausea or vomiting. No chest pains or shortness of breath.     No Known Allergies    Current medicines (including changes today)  Current Outpatient Medications   Medication Sig Dispense Refill   • lisinopril (PRINIVIL) 10 MG Tab Take 1 Tab by mouth every day. TAKE ONE TABLET BY MOUTH ONCE DAILY 90 Tab 3   • OZEMPIC, 0.25 OR 0.5 MG/DOSE, 2 MG/1.5ML Solution Pen-injector INJECT 0.25 MG AS DIRECTED EVERY 7 DAYS 2 mL 5   • atorvastatin (LIPITOR) 20 MG Tab TAKE 1 TABLET BY MOUTH EVERY DAY AT BEDTIME 90 Tab 1   • sildenafil citrate (VIAGRA) 100 MG tablet Take 1 Tab by mouth as needed for Erectile Dysfunction. 90 Tab 1   • colchicine (COLCRYS) 0.6 MG Tab Take 2 tabs by mouth at first onset of gout and take another tablet after 1 hour. 30 Tab 1   • MER ASPIRIN PO Take 81 mg by mouth.     • B Complex Vitamins (VITAMIN-B COMPLEX PO) Take  by mouth.     • Cholecalciferol (VITAMIN D) 2000 UNIT Tab Take 6,000 Units by mouth every day.     • Blood Glucose Monitoring Suppl Device Meter: Dispense Device of Insurance Preference. Sig. Use as directed for blood sugar monitoring. #1. NR. 1 Device 0   • Blood Glucose Test Strips Test strips order: Test strips for glucometer. Sig: use once daily before breakfast. 50 Strip 5   • Blood Glucose Test Strips Test strips order: Test strips for glucometer. Sig: use  Once daily before breakfast. 50 Strip 5     No current facility-administered medications for  this visit.        Patient Active Problem List    Diagnosis Date Noted   • Stage 3 chronic kidney disease (HCC) 05/24/2019   • Vitamin D deficiency 05/24/2019   • Gout 05/02/2019   • Diabetes mellitus due to underlying condition, uncontrolled, with hyperglycemia ([facility]) 05/02/2019   • History of gout 04/01/2019   • Weight gain 04/01/2019   • Chronic fatigue 04/01/2019   • Morbid obesity with BMI of 40.0-44.9, adult ([facility]) 05/04/2017   • YOVANA on CPAP    • HTN (hypertension)    • ED (erectile dysfunction)        Family History   Problem Relation Age of Onset   • Diabetes Father    • Hyperlipidemia Father    • Stroke Father    • Sleep Apnea Neg Hx        He  has a past medical history of Chickenpox, Diabetes (HCC), ED (erectile dysfunction), HTN (hypertension), Obesity, YOVANA on CPAP, and Sleep apnea.  He  has a past surgical history that includes colonoscopy with polyp (2013); other; arthroscopy, knee; colonoscopy with polyp (03/28/2019); and sinuscope.       Objective:   Vitals obtained by patient:  Temp 36.6 °C (97.8 °F)   Ht 1.829 m (6')   Wt (!) 132.9 kg (293 lb)   SpO2 91%   BMI 39.74 kg/m²     Physical Exam:  Constitutional: Alert, no distress, well-groomed.  Skin: No rashes in visible areas.  Oval light brown rough textured skin lesion right side of the neck most likely seborrheic keratosis  Eye: Round. Conjunctiva clear, lids normal. No icterus.   ENMT: Lips pink without lesions, good dentition, moist mucous membranes. Phonation normal.  Neck: No masses, no thyromegaly. Moves freely without pain.  CV: Pulse as reported by patient  Respiratory: Unlabored respiratory effort, no cough or audible wheeze  Psych: Alert and oriented x3, normal affect and mood.     Hospital Outpatient Visit on 08/18/2020   Component Date Value Ref Range Status   • Glycohemoglobin 08/18/2020 9.6* 0.0 - 5.6 % Final    Comment: Increased risk for diabetes:  5.7 -6.4%  Diabetes:  >6.4%  Glycemic control for adults with diabetes:  <7.0%  The  above interpretations are per ADA guidelines.  Diagnosis  of diabetes mellitus on the basis of elevated Hemoglobin A1c  should be confirmed by repeating the Hb A1c test.     • Est Avg Glucose 08/18/2020 229  mg/dL Final    Comment: The eAG calculation is based on the A1c-Derived Daily Glucose  (ADAG) study.  See the ADA's website for additional information.       Results for STACEY KOWALSKI (MRN [id number]) as of 8/19/2020 16:05   Ref. Range 8/14/2020 10:55   Sodium Latest Ref Range: 135 - 145 mmol/L 135   Potassium Latest Ref Range: 3.6 - 5.5 mmol/L 4.8   Chloride Latest Ref Range: 96 - 112 mmol/L 103   Co2 Latest Ref Range: 20 - 33 mmol/L 19 (L)   Anion Gap Latest Ref Range: 7.0 - 16.0  13.0   Glucose Latest Ref Range: 65 - 99 mg/dL 230 (H)   Bun Latest Ref Range: 8 - 22 mg/dL 28 (H)   Creatinine Latest Ref Range: 0.50 - 1.40 mg/dL 1.55 (H)   GFR If  Latest Ref Range: >60 mL/min/1.73 m 2 56 (A)   GFR If Non  Latest Ref Range: >60 mL/min/1.73 m 2 46 (A)   Calcium Latest Ref Range: 8.5 - 10.5 mg/dL 9.6   AST(SGOT) Latest Ref Range: 12 - 45 U/L 17   ALT(SGPT) Latest Ref Range: 2 - 50 U/L 16   Alkaline Phosphatase Latest Ref Range: 30 - 99 U/L 76   Total Bilirubin Latest Ref Range: 0.1 - 1.5 mg/dL 0.4   Albumin Latest Ref Range: 3.2 - 4.9 g/dL 4.2   Total Protein Latest Ref Range: 6.0 - 8.2 g/dL 6.8   Globulin Latest Ref Range: 1.9 - 3.5 g/dL 2.6   A-G Ratio Latest Units: g/dL 1.6   Fasting Status Unknown Fasting   Cholesterol,Tot Latest Ref Range: 100 - 199 mg/dL 151   Triglycerides Latest Ref Range: 0 - 149 mg/dL 135   HDL Latest Ref Range: >=40 mg/dL 47   LDL Latest Ref Range: <100 mg/dL 77       Assessment and Plan:   The following treatment plan was discussed:     1. Type 2 diabetes mellitus with stage 3 chronic kidney disease, without long-term current use of insulin (HCC)  - Lipid Profile; Future  - Comp Metabolic Panel; Future  - HEMOGLOBIN A1C; Future  - CBC WITH DIFFERENTIAL;  Future  - VITAMIN D,25 HYDROXY; Future  - PROSTATE SPECIFIC AG SCREENING; Future  - MICROALBUMIN CREAT RATIO URINE; Future    2. Stage 3 chronic kidney disease (HCC)  - Lipid Profile; Future  - Comp Metabolic Panel; Future  - HEMOGLOBIN A1C; Future  - CBC WITH DIFFERENTIAL; Future  - VITAMIN D,25 HYDROXY; Future  - PROSTATE SPECIFIC AG SCREENING; Future  - MICROALBUMIN CREAT RATIO URINE; Future    3. Essential hypertension  - Lipid Profile; Future  - Comp Metabolic Panel; Future  - HEMOGLOBIN A1C; Future  - CBC WITH DIFFERENTIAL; Future  - VITAMIN D,25 HYDROXY; Future  - PROSTATE SPECIFIC AG SCREENING; Future  - lisinopril (PRINIVIL) 10 MG Tab; Take 1 Tab by mouth every day. TAKE ONE TABLET BY MOUTH ONCE DAILY  Dispense: 90 Tab; Refill: 3  - MICROALBUMIN CREAT RATIO URINE; Future    4. Dyslipidemia  - Lipid Profile; Future  - Comp Metabolic Panel; Future  - HEMOGLOBIN A1C; Future  - CBC WITH DIFFERENTIAL; Future  - VITAMIN D,25 HYDROXY; Future  - PROSTATE SPECIFIC AG SCREENING; Future  - MICROALBUMIN CREAT RATIO URINE; Future    5. Vitamin D deficiency  - Lipid Profile; Future  - Comp Metabolic Panel; Future  - HEMOGLOBIN A1C; Future  - CBC WITH DIFFERENTIAL; Future  - VITAMIN D,25 HYDROXY; Future  - PROSTATE SPECIFIC AG SCREENING; Future  - MICROALBUMIN CREAT RATIO URINE; Future    6. Screening for prostate cancer  - Lipid Profile; Future  - Comp Metabolic Panel; Future  - HEMOGLOBIN A1C; Future  - CBC WITH DIFFERENTIAL; Future  - VITAMIN D,25 HYDROXY; Future  - PROSTATE SPECIFIC AG SCREENING; Future  - MICROALBUMIN CREAT RATIO URINE; Future    7.  Seborrheic keratosis    1.  Diabetes is now out of control and hemoglobin A1c increased from 6.9-9.6.  This could be because of the fact that he has not been exercising and he has not been watching his diet well.  Increase Ozempic to 0.5 mg weekly.  Advised to work hard on watching his diet, regular exercise and losing weight.  I will recheck blood work next visit in 3  months.  2.  There is improvement of his kidney function.  Continue avoidance of nephrotoxic agents.  Continue lisinopril.  Keep follow-up with nephrologist on a yearly basis.  3.  Blood pressure is well controlled on lisinopril 10 mg daily.  Continue medication.  4.  All at target on his cholesterol medication.  Continue atorvastatin.  5.  This is adequately replaced.  Continue the same dose of vitamin D supplementation.  6.  We will do screening PSA with the next blood work in 3 months.  7.  Reassured patient that the skin lesion on the right side of the neck and this right submandibular area is consistent with seborrheic keratosis.  This is a benign skin lesion.  He was made aware that he has option to get this treated with liquid nitrogen.  I will look at it more closely on his in office visit in 3 months.    Follow-up: 3 months for physical exam.       Please note that this dictation was created using voice recognition software. I have worked with consultants from the vendor as well as technical experts from [employer] to optimize the interface. I have made every reasonable attempt to correct obvious errors, but I expect that there are errors of grammar and possibly content I did not discover before finalizing the note.

## 2020-11-16 DIAGNOSIS — E78.5 DYSLIPIDEMIA: ICD-10-CM

## 2020-11-16 DIAGNOSIS — M10.9 GOUT, ARTHRITIS: ICD-10-CM

## 2020-11-16 RX ORDER — ATORVASTATIN CALCIUM 20 MG/1
TABLET, FILM COATED ORAL
Qty: 90 TAB | Refills: 1 | Status: SHIPPED | OUTPATIENT
Start: 2020-11-16 | End: 2021-07-25

## 2020-11-16 RX ORDER — COLCHICINE 0.6 MG/1
TABLET ORAL
Qty: 30 TAB | Refills: 1 | Status: SHIPPED | OUTPATIENT
Start: 2020-11-16 | End: 2023-12-05 | Stop reason: SDUPTHER

## 2020-11-17 ENCOUNTER — TELEMEDICINE (OUTPATIENT)
Dept: SLEEP MEDICINE | Facility: MEDICAL CENTER | Age: 55
End: 2020-11-17
Payer: COMMERCIAL

## 2020-11-17 VITALS — WEIGHT: 295 LBS | HEIGHT: 72 IN | BODY MASS INDEX: 39.96 KG/M2

## 2020-11-17 DIAGNOSIS — G47.33 OSA ON CPAP: ICD-10-CM

## 2020-11-17 PROCEDURE — 99213 OFFICE O/P EST LOW 20 MIN: CPT | Mod: 95,CR | Performed by: FAMILY MEDICINE

## 2020-11-17 ASSESSMENT — FIBROSIS 4 INDEX: FIB4 SCORE: 1.17

## 2020-11-17 NOTE — PATIENT INSTRUCTIONS
"CPAP and BPAP Information  CPAP and BPAP are methods of helping a person breathe with the use of air pressure. CPAP stands for \"continuous positive airway pressure.\" BPAP stands for \"bi-level positive airway pressure.\" In both methods, air is blown through your nose or mouth and into your air passages to help you breathe well.  CPAP and BPAP use different amounts of pressure to blow air. With CPAP, the amount of pressure stays the same while you breathe in and out. With BPAP, the amount of pressure is increased when you breathe in (inhale) so that you can take larger breaths. Your health care provider will recommend whether CPAP or BPAP would be more helpful for you.  Why are CPAP and BPAP treatments used?  CPAP or BPAP can be helpful if you have:  · Sleep apnea.  · Chronic obstructive pulmonary disease (COPD).  · Heart failure.  · Medical conditions that weaken the muscles of the chest including muscular dystrophy, or neurological diseases such as amyotrophic lateral sclerosis (ALS).  · Other problems that cause breathing to be weak, abnormal, or difficult.  CPAP is most commonly used for obstructive sleep apnea (YOVANA) to keep the airways from collapsing when the muscles relax during sleep.  How is CPAP or BPAP administered?  Both CPAP and BPAP are provided by a small machine with a flexible plastic tube that attaches to a plastic mask. You wear the mask. Air is blown through the mask into your nose or mouth. The amount of pressure that is used to blow the air can be adjusted on the machine. Your health care provider will determine the pressure setting that should be used based on your individual needs.  When should CPAP or BPAP be used?  In most cases, the mask only needs to be worn during sleep. Generally, the mask needs to be worn throughout the night and during any daytime naps. People with certain medical conditions may also need to wear the mask at other times when they are awake. Follow instructions from your " health care provider about when to use the machine.  What are some tips for using the mask?    · Because the mask needs to be snug, some people feel trapped or closed-in (claustrophobic) when first using the mask. If you feel this way, you may need to get used to the mask. One way to do this is by holding the mask loosely over your nose or mouth and then gradually applying the mask more snugly. You can also gradually increase the amount of time that you use the mask.  · Masks are available in various types and sizes. Some fit over your mouth and nose while others fit over just your nose. If your mask does not fit well, talk with your health care provider about getting a different one.  · If you are using a mask that fits over your nose and you tend to breathe through your mouth, a chin strap may be applied to help keep your mouth closed.  · The CPAP and BPAP machines have alarms that may sound if the mask comes off or develops a leak.  · If you have trouble with the mask, it is very important that you talk with your health care provider about finding a way to make the mask easier to tolerate. Do not stop using the mask. Stopping the use of the mask could have a negative impact on your health.  What are some tips for using the machine?  · Place your CPAP or BPAP machine on a secure table or stand near an electrical outlet.  · Know where the on/off switch is located on the machine.  · Follow instructions from your health care provider about how to set the pressure on your machine and when you should use it.  · Do not eat or drink while the CPAP or BPAP machine is on. Food or fluids could get pushed into your lungs by the pressure of the CPAP or BPAP.  · Do not smoke. Tobacco smoke residue can damage the machine.  · For home use, CPAP and BPAP machines can be rented or purchased through home health care companies. Many different brands of machines are available. Renting a machine before purchasing may help you find out  which particular machine works well for you.  · Keep the CPAP or BPAP machine and attachments clean. Ask your health care provider for specific instructions.  Get help right away if:  · You have redness or open areas around your nose or mouth where the mask fits.  · You have trouble using the CPAP or BPAP machine.  · You cannot tolerate wearing the CPAP or BPAP mask.  · You have pain, discomfort, and bloating in your abdomen.  Summary  · CPAP and BPAP are methods of helping a person breathe with the use of air pressure.  · Both CPAP and BPAP are provided by a small machine with a flexible plastic tube that attaches to a plastic mask.  · If you have trouble with the mask, it is very important that you talk with your health care provider about finding a way to make the mask easier to tolerate.  This information is not intended to replace advice given to you by your health care provider. Make sure you discuss any questions you have with your health care provider.  Document Released: 09/15/2005 Document Revised: 04/08/2020 Document Reviewed: 11/06/2017  Elsevier Patient Education © 2020 [employer]

## 2020-11-17 NOTE — PROGRESS NOTES
Telemedicine Visit: Established Patient     This encounter was conducted via Zoom. Verbal consent was obtained. Patient's identity was verified.       Given the importance of social distancing and other strategies recommended to reduce the risk of COVID-19 transmission, I am providing medical care to this patient via audio/video visit in place of an in person visit at the request of the patient.    [facility] Follow Up Note     Date: 11/17/2020 / Time: 2:54 PM    Patient ID:   Name:             Pankaj Limon     YOB: 1965  Age:                 55 y.o.  male   MRN:               [id number]      Thank you for requesting a sleep medicine consultation on Pankaj Limon at the sleep center. He presents today with the chief complaints of YOVANA and 1st compliance follow up. The HST on 9/20/20 showed Severe obstructive sleep apnea with  Respiratory Event Index (MADDI) of 38.1 per hour and worse in supine sleep with MADDI at 49.7.  Nocturnal hypoxia: O2 Sat. evangelista was 74%, avg O2 was 91 % and baseline O2 at 100 %. O2 sat was below 89% for 92 min of the flow evaluation time. Avg HR 73 BPM.    HISTORY OF PRESENT ILLNESS:       Pt is currently on ACPAP 5-15 cm . He goes to sleep around 10:30 pm and wakes up around 5:30 am. He is getting about 8 hrs of sleep on a good night and about 5-6 hr of sleep on a bad night. He doesn't feel any benefit from the CPAP but he has been very inconsistent with the usage. He denies any symptoms of RLS, narcolepsy or any symptoms to suggest parasomnias such as nightmares, sleep walking or acting out of dreams.      He is not using CPAP most days of the week. Pt reports 5-6 hrs of average nightly use of CPAP. Pt denies snoring, gasping,choking.Pt also denies significant mask leak that is interfering with sleep. The 30 day compliance was downloaded which shows inadequate compliance with more that 4 hr usage about 13%. The AHI is has improved to 4.9/hr. The mask leak is  normal. The average pressure is 8.9-13.2 cm. He has not been ableThe symptoms of snoring has improved.       REVIEW OF SYSTEMS:       Constitutional: Denies fevers, Denies weight changes  Eyes: Denies changes in vision, no eye pain  Ears/Nose/Throat/Mouth: Denies nasal congestion or sore throat   Cardiovascular: Denies chest pain or palpitations   Respiratory: Denies shortness of breath , Denies cough  Gastrointestinal/Hepatic: Denies abdominal pain, nausea, vomiting,   Psychiatric: denies mood disorder   Sleep: the snoring has improved with the CPAP    Comprehensive review of systems form is reviewed with the patient and is attached in the EMR.     PMH:  has a past medical history of Chickenpox, Diabetes (HCC), ED (erectile dysfunction), HTN (hypertension), Obesity, YOVANA on CPAP, and Sleep apnea.  MEDS:   Current Outpatient Medications:   •  atorvastatin (LIPITOR) 20 MG Tab, TAKE 1 TABLET BY MOUTH ONCE DAILY AT BEDTIME, Disp: 90 Tab, Rfl: 1  •  colchicine (COLCRYS) 0.6 MG Tab, TAKE 2 TABLETS BY MOUTH AT FIRST ONSET OF GOUT AND TAKE ANOTHER TABLET AFTER 1 HOUR, Disp: 30 Tab, Rfl: 1  •  lisinopril (PRINIVIL) 10 MG Tab, Take 1 Tab by mouth every day. TAKE ONE TABLET BY MOUTH ONCE DAILY, Disp: 90 Tab, Rfl: 3  •  OZEMPIC, 0.25 OR 0.5 MG/DOSE, 2 MG/1.5ML Solution Pen-injector, INJECT 0.25 MG AS DIRECTED EVERY 7 DAYS, Disp: 2 mL, Rfl: 5  •  MER ASPIRIN PO, Take 81 mg by mouth., Disp: , Rfl:   •  B Complex Vitamins (VITAMIN-B COMPLEX PO), Take  by mouth., Disp: , Rfl:   •  Cholecalciferol (VITAMIN D) 2000 UNIT Tab, Take 6,000 Units by mouth every day., Disp: , Rfl:   •  sildenafil citrate (VIAGRA) 100 MG tablet, Take 1 Tab by mouth as needed for Erectile Dysfunction., Disp: 90 Tab, Rfl: 1  •  Blood Glucose Monitoring Suppl Device, Meter: Dispense Device of Insurance Preference. Sig. Use as directed for blood sugar monitoring. #1. NR., Disp: 1 Device, Rfl: 0  •  Blood Glucose Test Strips, Test strips order: Test strips for  glucometer. Sig: use once daily before breakfast., Disp: 50 Strip, Rfl: 5  •  Blood Glucose Test Strips, Test strips order: Test strips for glucometer. Sig: use  Once daily before breakfast., Disp: 50 Strip, Rfl: 5  ALLERGIES: No Known Allergies  SURGHX:   Past Surgical History:   Procedure Laterality Date   • COLONOSCOPY WITH POLYP  03/28/2019    Polyp ascending colon, polyp transverse colon-3 adenomatous polyps, mild diverticulosis sigmoid colon   • COLONOSCOPY WITH POLYP  2013    polyp - adenomatous- [facility]   • ARTHROSCOPY, KNEE      left knee    • OTHER      repair of perforated nasal septum   • SINUSCOPE       SOCHX:  reports that he has never smoked. He has never used smokeless tobacco. He reports current alcohol use. He reports that he does not use drugs..  FH:   Family History   Problem Relation Age of Onset   • Diabetes Father    • Hyperlipidemia Father    • Stroke Father    • Sleep Apnea Neg Hx          Physical Exam:  Vitals/ General Appearance:   Weight/BMI: Body mass index is 40.01 kg/m².  Ht 1.829 m (6')   Wt (!) 133.8 kg (295 lb)   Vitals:    11/17/20 1453   Weight: (!) 133.8 kg (295 lb)   Height: 1.829 m (6')       Pt. is alert and oriented to time, place and person. Cooperative and in no apparent distress.       Constitutional: Alert, no distress, well-groomed.  Skin: No rashes in visible areas.  Eye: Round. Conjunctiva clear, lids normal. No icterus.   ENMT: Lips pink without lesions, good dentition, moist mucous membranes. Phonation normal.  Neck: No masses, no thyromegaly. Moves freely without pain.  CV: Pulse as reported by patient  Respiratory: Unlabored respiratory effort, no cough or audible wheeze  Psych: Alert and oriented x3, normal affect and mood.     ASSESSMENT AND PLAN     1. Sleep Apnea .   He is urged to avoid supine sleep, weight gain and alcoholic beverages since all of these can worsen sleep apnea. He is cautioned against drowsy driving. If He feels sleepy while driving, He must  pull over for a break/nap, rather than persist on the road, in the interest of He own safety and that of others on the road.   Plan   - continue  ACPAP 5-15 cm    - compliance download was reviewed and discussed with the pt   - compliance was reinforced     2. Regarding treatment of other past medical problems and general health maintenance,  He is urged to follow up with PCP.

## 2020-11-23 ENCOUNTER — HOSPITAL ENCOUNTER (OUTPATIENT)
Dept: LAB | Facility: MEDICAL CENTER | Age: 55
End: 2020-11-23
Attending: PHYSICIAN ASSISTANT
Payer: COMMERCIAL

## 2020-11-23 LAB
COVID ORDER STATUS COVID19: NORMAL
SARS-COV-2 RNA RESP QL NAA+PROBE: DETECTED
SPECIMEN SOURCE: ABNORMAL

## 2020-11-23 PROCEDURE — C9803 HOPD COVID-19 SPEC COLLECT: HCPCS

## 2020-11-23 PROCEDURE — U0003 INFECTIOUS AGENT DETECTION BY NUCLEIC ACID (DNA OR RNA); SEVERE ACUTE RESPIRATORY SYNDROME CORONAVIRUS 2 (SARS-COV-2) (CORONAVIRUS DISEASE [COVID-19]), AMPLIFIED PROBE TECHNIQUE, MAKING USE OF HIGH THROUGHPUT TECHNOLOGIES AS DESCRIBED BY CMS-2020-01-R: HCPCS

## 2020-11-30 ENCOUNTER — APPOINTMENT (OUTPATIENT)
Dept: RADIOLOGY | Facility: MEDICAL CENTER | Age: 55
DRG: 177 | End: 2020-11-30
Attending: EMERGENCY MEDICINE
Payer: COMMERCIAL

## 2020-11-30 ENCOUNTER — HOSPITAL ENCOUNTER (INPATIENT)
Facility: MEDICAL CENTER | Age: 55
LOS: 1 days | DRG: 177 | End: 2020-12-01
Attending: EMERGENCY MEDICINE | Admitting: HOSPITALIST
Payer: COMMERCIAL

## 2020-11-30 DIAGNOSIS — U07.1 COVID-19 VIRUS INFECTION: ICD-10-CM

## 2020-11-30 DIAGNOSIS — J12.82 PNEUMONIA DUE TO COVID-19 VIRUS: ICD-10-CM

## 2020-11-30 DIAGNOSIS — U07.1 PNEUMONIA DUE TO COVID-19 VIRUS: ICD-10-CM

## 2020-11-30 PROBLEM — E66.9 OBESITY (BMI 30-39.9): Status: ACTIVE | Noted: 2017-05-04

## 2020-11-30 PROBLEM — N17.9 ACUTE RENAL FAILURE SUPERIMPOSED ON STAGE 3 CHRONIC KIDNEY DISEASE (HCC): Status: ACTIVE | Noted: 2019-05-24

## 2020-11-30 PROBLEM — J96.01 ACUTE HYPOXEMIC RESPIRATORY FAILURE (HCC): Status: ACTIVE | Noted: 2020-11-30

## 2020-11-30 LAB
ALBUMIN SERPL BCP-MCNC: 3.4 G/DL (ref 3.2–4.9)
ALBUMIN/GLOB SERPL: 0.9 G/DL
ALP SERPL-CCNC: 60 U/L (ref 30–99)
ALT SERPL-CCNC: 19 U/L (ref 2–50)
ANION GAP SERPL CALC-SCNC: 14 MMOL/L (ref 7–16)
AST SERPL-CCNC: 35 U/L (ref 12–45)
BASOPHILS # BLD AUTO: 0.2 % (ref 0–1.8)
BASOPHILS # BLD: 0.01 K/UL (ref 0–0.12)
BILIRUB SERPL-MCNC: 0.6 MG/DL (ref 0.1–1.5)
BUN SERPL-MCNC: 33 MG/DL (ref 8–22)
CALCIUM SERPL-MCNC: 9.1 MG/DL (ref 8.4–10.2)
CHLORIDE SERPL-SCNC: 96 MMOL/L (ref 96–112)
CO2 SERPL-SCNC: 19 MMOL/L (ref 20–33)
CREAT SERPL-MCNC: 2.21 MG/DL (ref 0.5–1.4)
CRP SERPL HS-MCNC: 17.54 MG/DL (ref 0–0.75)
D DIMER PPP IA.FEU-MCNC: 0.83 UG/ML (FEU) (ref 0–0.5)
EKG IMPRESSION: NORMAL
EOSINOPHIL # BLD AUTO: 0 K/UL (ref 0–0.51)
EOSINOPHIL NFR BLD: 0 % (ref 0–6.9)
ERYTHROCYTE [DISTWIDTH] IN BLOOD BY AUTOMATED COUNT: 40.7 FL (ref 35.9–50)
GLOBULIN SER CALC-MCNC: 3.8 G/DL (ref 1.9–3.5)
GLUCOSE SERPL-MCNC: 222 MG/DL (ref 65–99)
HCT VFR BLD AUTO: 37.8 % (ref 42–52)
HGB BLD-MCNC: 13.3 G/DL (ref 14–18)
IMM GRANULOCYTES # BLD AUTO: 0.02 K/UL (ref 0–0.11)
IMM GRANULOCYTES NFR BLD AUTO: 0.3 % (ref 0–0.9)
LYMPHOCYTES # BLD AUTO: 0.8 K/UL (ref 1–4.8)
LYMPHOCYTES NFR BLD: 12 % (ref 22–41)
MCH RBC QN AUTO: 31.2 PG (ref 27–33)
MCHC RBC AUTO-ENTMCNC: 35.2 G/DL (ref 33.7–35.3)
MCV RBC AUTO: 88.7 FL (ref 81.4–97.8)
MONOCYTES # BLD AUTO: 0.43 K/UL (ref 0–0.85)
MONOCYTES NFR BLD AUTO: 6.5 % (ref 0–13.4)
NEUTROPHILS # BLD AUTO: 5.38 K/UL (ref 1.82–7.42)
NEUTROPHILS NFR BLD: 81 % (ref 44–72)
NRBC # BLD AUTO: 0 K/UL
NRBC BLD-RTO: 0 /100 WBC
PLATELET # BLD AUTO: 194 K/UL (ref 164–446)
PMV BLD AUTO: 9.6 FL (ref 9–12.9)
POTASSIUM SERPL-SCNC: 4.3 MMOL/L (ref 3.6–5.5)
PROCALCITONIN SERPL-MCNC: 0.31 NG/ML
PROT SERPL-MCNC: 7.2 G/DL (ref 6–8.2)
RBC # BLD AUTO: 4.26 M/UL (ref 4.7–6.1)
SODIUM SERPL-SCNC: 129 MMOL/L (ref 135–145)
WBC # BLD AUTO: 6.6 K/UL (ref 4.8–10.8)

## 2020-11-30 PROCEDURE — 80053 COMPREHEN METABOLIC PANEL: CPT

## 2020-11-30 PROCEDURE — 700102 HCHG RX REV CODE 250 W/ 637 OVERRIDE(OP): Performed by: HOSPITALIST

## 2020-11-30 PROCEDURE — 93005 ELECTROCARDIOGRAM TRACING: CPT

## 2020-11-30 PROCEDURE — 99223 1ST HOSP IP/OBS HIGH 75: CPT | Performed by: HOSPITALIST

## 2020-11-30 PROCEDURE — 71045 X-RAY EXAM CHEST 1 VIEW: CPT

## 2020-11-30 PROCEDURE — 84145 PROCALCITONIN (PCT): CPT

## 2020-11-30 PROCEDURE — 93005 ELECTROCARDIOGRAM TRACING: CPT | Performed by: EMERGENCY MEDICINE

## 2020-11-30 PROCEDURE — 700111 HCHG RX REV CODE 636 W/ 250 OVERRIDE (IP): Performed by: EMERGENCY MEDICINE

## 2020-11-30 PROCEDURE — 99285 EMERGENCY DEPT VISIT HI MDM: CPT

## 2020-11-30 PROCEDURE — 700111 HCHG RX REV CODE 636 W/ 250 OVERRIDE (IP): Performed by: HOSPITALIST

## 2020-11-30 PROCEDURE — 85379 FIBRIN DEGRADATION QUANT: CPT

## 2020-11-30 PROCEDURE — A9270 NON-COVERED ITEM OR SERVICE: HCPCS | Performed by: HOSPITALIST

## 2020-11-30 PROCEDURE — 770021 HCHG ROOM/CARE - ISO PRIVATE

## 2020-11-30 PROCEDURE — 86140 C-REACTIVE PROTEIN: CPT

## 2020-11-30 PROCEDURE — 36415 COLL VENOUS BLD VENIPUNCTURE: CPT

## 2020-11-30 PROCEDURE — 96374 THER/PROPH/DIAG INJ IV PUSH: CPT

## 2020-11-30 PROCEDURE — 85025 COMPLETE CBC W/AUTO DIFF WBC: CPT

## 2020-11-30 PROCEDURE — 700105 HCHG RX REV CODE 258: Performed by: EMERGENCY MEDICINE

## 2020-11-30 RX ORDER — HEPARIN SODIUM 5000 [USP'U]/ML
5000 INJECTION, SOLUTION INTRAVENOUS; SUBCUTANEOUS EVERY 8 HOURS
Status: DISCONTINUED | OUTPATIENT
Start: 2020-11-30 | End: 2020-12-01 | Stop reason: HOSPADM

## 2020-11-30 RX ORDER — ENALAPRILAT 1.25 MG/ML
1.25 INJECTION INTRAVENOUS EVERY 6 HOURS PRN
Status: DISCONTINUED | OUTPATIENT
Start: 2020-11-30 | End: 2020-12-01 | Stop reason: HOSPADM

## 2020-11-30 RX ORDER — ONDANSETRON 2 MG/ML
4 INJECTION INTRAMUSCULAR; INTRAVENOUS EVERY 6 HOURS PRN
Status: DISCONTINUED | OUTPATIENT
Start: 2020-11-30 | End: 2020-12-01 | Stop reason: HOSPADM

## 2020-11-30 RX ORDER — ATORVASTATIN CALCIUM 20 MG/1
20 TABLET, FILM COATED ORAL EVERY EVENING
Status: DISCONTINUED | OUTPATIENT
Start: 2020-11-30 | End: 2020-12-01 | Stop reason: HOSPADM

## 2020-11-30 RX ORDER — ACETAMINOPHEN 325 MG/1
650 TABLET ORAL EVERY 6 HOURS PRN
Status: DISCONTINUED | OUTPATIENT
Start: 2020-11-30 | End: 2020-12-01 | Stop reason: HOSPADM

## 2020-11-30 RX ORDER — SODIUM CHLORIDE, SODIUM LACTATE, POTASSIUM CHLORIDE, CALCIUM CHLORIDE 600; 310; 30; 20 MG/100ML; MG/100ML; MG/100ML; MG/100ML
1000 INJECTION, SOLUTION INTRAVENOUS CONTINUOUS
Status: DISCONTINUED | OUTPATIENT
Start: 2020-11-30 | End: 2020-12-01

## 2020-11-30 RX ORDER — DEXAMETHASONE 4 MG/1
6 TABLET ORAL DAILY
Status: DISCONTINUED | OUTPATIENT
Start: 2020-12-01 | End: 2020-12-01 | Stop reason: HOSPADM

## 2020-11-30 RX ORDER — LABETALOL HYDROCHLORIDE 5 MG/ML
10 INJECTION, SOLUTION INTRAVENOUS EVERY 4 HOURS PRN
Status: DISCONTINUED | OUTPATIENT
Start: 2020-11-30 | End: 2020-12-01 | Stop reason: HOSPADM

## 2020-11-30 RX ORDER — SODIUM CHLORIDE, SODIUM LACTATE, POTASSIUM CHLORIDE, CALCIUM CHLORIDE 600; 310; 30; 20 MG/100ML; MG/100ML; MG/100ML; MG/100ML
INJECTION, SOLUTION INTRAVENOUS CONTINUOUS
Status: DISCONTINUED | OUTPATIENT
Start: 2020-11-30 | End: 2020-11-30

## 2020-11-30 RX ORDER — DEXTROSE MONOHYDRATE 25 G/50ML
50 INJECTION, SOLUTION INTRAVENOUS
Status: DISCONTINUED | OUTPATIENT
Start: 2020-11-30 | End: 2020-12-01 | Stop reason: HOSPADM

## 2020-11-30 RX ORDER — ONDANSETRON 4 MG/1
4 TABLET, ORALLY DISINTEGRATING ORAL EVERY 6 HOURS PRN
Status: DISCONTINUED | OUTPATIENT
Start: 2020-11-30 | End: 2020-12-01 | Stop reason: HOSPADM

## 2020-11-30 RX ADMIN — ATORVASTATIN CALCIUM 20 MG: 20 TABLET, FILM COATED ORAL at 23:40

## 2020-11-30 RX ADMIN — SODIUM CHLORIDE, POTASSIUM CHLORIDE, SODIUM LACTATE AND CALCIUM CHLORIDE: 600; 310; 30; 20 INJECTION, SOLUTION INTRAVENOUS at 13:37

## 2020-11-30 RX ADMIN — HEPARIN SODIUM 5000 UNITS: 5000 INJECTION, SOLUTION INTRAVENOUS; SUBCUTANEOUS at 23:40

## 2020-11-30 RX ADMIN — HYDROCORTISONE SODIUM SUCCINATE 100 MG: 100 INJECTION, POWDER, FOR SOLUTION INTRAMUSCULAR; INTRAVENOUS at 13:38

## 2020-11-30 ASSESSMENT — ENCOUNTER SYMPTOMS
CHILLS: 1
DIARRHEA: 1
CONSTIPATION: 0
HEADACHES: 0
COUGH: 1
NAUSEA: 0
WHEEZING: 0
SHORTNESS OF BREATH: 1
FEVER: 0
VOMITING: 0

## 2020-11-30 ASSESSMENT — FIBROSIS 4 INDEX: FIB4 SCORE: 1.17

## 2020-11-30 NOTE — H&P
Hospital Medicine History & Physical Note    Date of Service  11/30/2020    Primary Care Physician  Melly Jimenez M.D.    Consultants  None    Code Status  Full code per patient    Chief Complaint  No chief complaint on file.  Shortness of breath    History of Presenting Illness  55 y.o. male w/h/o chickenpox, diabetes, hypertension, YOVANA on CPAP who presented 11/30/2020 with shortness of breath.  Patient works in [employer] at [employer] [address] [place].  His coworker got sick and was also in the office.  Patient then got Covid which was diagnosed on 11/24/2020.  Patient became progressively more short of breath.  He called his PCP and was told to come to the ER.    Review of Systems  Review of Systems   Constitutional: Positive for chills. Negative for fever.   Respiratory: Positive for cough and shortness of breath. Negative for wheezing.    Cardiovascular: Negative for chest pain.   Gastrointestinal: Positive for diarrhea. Negative for constipation, nausea and vomiting.   Genitourinary: Negative for dysuria.   Neurological: Negative for headaches.     all other systems reviewed and are negative    Past Medical History   has a past medical history of Chickenpox, Diabetes (HCC), ED (erectile dysfunction), HTN (hypertension), Obesity, YOVANA on CPAP, and Sleep apnea.    Surgical History   has a past surgical history that includes colonoscopy with polyp (2013); other; arthroscopy, knee; colonoscopy with polyp (03/28/2019); and sinuscope.    Family History  family history includes Diabetes in his father; Hyperlipidemia in his father; Stroke in his father.    Social History   reports that he has never smoked. He has never used smokeless tobacco. He reports current alcohol use. He reports that he does not use drugs.    Allergies  No Known Allergies    Medications  No current facility-administered medications on file prior to encounter.      Current Outpatient Medications on File Prior to Encounter   Medication Sig Dispense Refill   •  aspirin EC (ECOTRIN) 81 MG Tablet Delayed Response Take 81 mg by mouth every morning.     • atorvastatin (LIPITOR) 20 MG Tab TAKE 1 TABLET BY MOUTH ONCE DAILY AT BEDTIME (Patient taking differently: Take 20 mg by mouth every bedtime.) 90 Tab 1   • colchicine (COLCRYS) 0.6 MG Tab TAKE 2 TABLETS BY MOUTH AT FIRST ONSET OF GOUT AND TAKE ANOTHER TABLET AFTER 1 HOUR (Patient not taking: Reported on 11/30/2020) 30 Tab 1   • lisinopril (PRINIVIL) 10 MG Tab Take 1 Tab by mouth every day. TAKE ONE TABLET BY MOUTH ONCE DAILY (Patient taking differently: Take 10 mg by mouth every morning.) 90 Tab 3   • OZEMPIC, 0.25 OR 0.5 MG/DOSE, 2 MG/1.5ML Solution Pen-injector INJECT 0.25 MG AS DIRECTED EVERY 7 DAYS (Patient taking differently: Inject 0.5 mg under the skin every Sunday.) 2 mL 5   • sildenafil citrate (VIAGRA) 100 MG tablet Take 1 Tab by mouth as needed for Erectile Dysfunction. (Patient not taking: Reported on 11/30/2020) 90 Tab 1   • Blood Glucose Monitoring Suppl Device Meter: Dispense Device of Insurance Preference. Sig. Use as directed for blood sugar monitoring. #1. NR. 1 Device 0   • Blood Glucose Test Strips Test strips order: Test strips for glucometer. Sig: use once daily before breakfast. 50 Strip 5   • Blood Glucose Test Strips Test strips order: Test strips for glucometer. Sig: use  Once daily before breakfast. 50 Strip 5   • Cholecalciferol (VITAMIN D) 2000 UNIT Tab Take 6,000 Units by mouth every morning. 3 tablets = 6000 units         Physical Exam  Weight/BMI: Body mass index is 38.33 kg/m².  /77   Pulse 100   Temp 36.3 °C (97.3 °F) (Temporal)   Resp 20   Ht 1.829 m (6')   Wt (!) 128.2 kg (282 lb 10.1 oz)   SpO2 98%    Vitals:    11/30/20 1129 11/30/20 1132 11/30/20 1346 11/30/20 1421   BP: 142/96  133/80 149/77   Pulse: (!) 117  (!) 104 100   Resp: 20      Temp: 36.3 °C (97.3 °F)      TempSrc: Temporal      SpO2: (!) 87% 93% 98% 98%   Weight: (!) 128.2 kg (282 lb 10.1 oz)      Height: 1.829 m  (6')       Oxygen Therapy:  Pulse Oximetry: 98 %, O2 Delivery Device: Silicone Nasal Cannula  Physical Exam  Constitutional:       Appearance: He is well-developed. He is obese.   HENT:      Head: Normocephalic.      Right Ear: External ear normal.      Left Ear: External ear normal.      Mouth/Throat:      Pharynx: No oropharyngeal exudate or posterior oropharyngeal erythema.   Eyes:      General:         Right eye: No discharge.         Left eye: No discharge.      Extraocular Movements: Extraocular movements intact.   Cardiovascular:      Rate and Rhythm: Tachycardia present.      Heart sounds: No gallop.    Pulmonary:      Effort: No respiratory distress.      Breath sounds: No wheezing.   Abdominal:      General: There is no distension.      Tenderness: There is no abdominal tenderness.   Skin:     General: Skin is warm.   Neurological:      Mental Status: He is alert and oriented to person, place, and time. Mental status is at baseline.   Psychiatric:         Mood and Affect: Mood normal.         Behavior: Behavior normal.         Laboratory:   Objective   Recent Results (from the past 24 hour(s))   EKG    Collection Time: 20 11:38 AM   Result Value Ref Range    Report       [facility] Emergency Dept.    Test Date:  2020  Pt Name:    STACEY KOWALSKI        Department: [facility]  MRN:        [id number]                      Room:  Gender:     Male                         Technician: HRR  :        1965                   Requested By:ER TRIAGE PROTOCOL  Order #:    [id number]                    Reading MD:    Measurements  Intervals                                Axis  Rate:       108                          P:          38  SC:         153                          QRS:        12  QRSD:       98                           T:          15  QT:         319  QTc:        428    Interpretive Statements  Sinus tachycardia  Atrial premature complex  Abnormal R-wave progression,  late transition  Baseline wander in lead(s) V5  No previous ECG available for comparison     CBC WITH DIFFERENTIAL    Collection Time: 11/30/20 11:46 AM   Result Value Ref Range    WBC 6.6 4.8 - 10.8 K/uL    RBC 4.26 (L) 4.70 - 6.10 M/uL    Hemoglobin 13.3 (L) 14.0 - 18.0 g/dL    Hematocrit 37.8 (L) 42.0 - 52.0 %    MCV 88.7 81.4 - 97.8 fL    MCH 31.2 27.0 - 33.0 pg    MCHC 35.2 33.7 - 35.3 g/dL    RDW 40.7 35.9 - 50.0 fL    Platelet Count 194 164 - 446 K/uL    MPV 9.6 9.0 - 12.9 fL    Neutrophils-Polys 81.00 (H) 44.00 - 72.00 %    Lymphocytes 12.00 (L) 22.00 - 41.00 %    Monocytes 6.50 0.00 - 13.40 %    Eosinophils 0.00 0.00 - 6.90 %    Basophils 0.20 0.00 - 1.80 %    Immature Granulocytes 0.30 0.00 - 0.90 %    Nucleated RBC 0.00 /100 WBC    Neutrophils (Absolute) 5.38 1.82 - 7.42 K/uL    Lymphs (Absolute) 0.80 (L) 1.00 - 4.80 K/uL    Monos (Absolute) 0.43 0.00 - 0.85 K/uL    Eos (Absolute) 0.00 0.00 - 0.51 K/uL    Baso (Absolute) 0.01 0.00 - 0.12 K/uL    Immature Granulocytes (abs) 0.02 0.00 - 0.11 K/uL    NRBC (Absolute) 0.00 K/uL   COMP METABOLIC PANEL    Collection Time: 11/30/20 11:46 AM   Result Value Ref Range    Sodium 129 (L) 135 - 145 mmol/L    Potassium 4.3 3.6 - 5.5 mmol/L    Chloride 96 96 - 112 mmol/L    Co2 19 (L) 20 - 33 mmol/L    Anion Gap 14.0 7.0 - 16.0    Glucose 222 (H) 65 - 99 mg/dL    Bun 33 (H) 8 - 22 mg/dL    Creatinine 2.21 (H) 0.50 - 1.40 mg/dL    Calcium 9.1 8.4 - 10.2 mg/dL    AST(SGOT) 35 12 - 45 U/L    ALT(SGPT) 19 2 - 50 U/L    Alkaline Phosphatase 60 30 - 99 U/L    Total Bilirubin 0.6 0.1 - 1.5 mg/dL    Albumin 3.4 3.2 - 4.9 g/dL    Total Protein 7.2 6.0 - 8.2 g/dL    Globulin 3.8 (H) 1.9 - 3.5 g/dL    A-G Ratio 0.9 g/dL   ESTIMATED GFR    Collection Time: 11/30/20 11:46 AM   Result Value Ref Range    GFR If  37 (A) >60 mL/min/1.73 m 2    GFR If Non  31 (A) >60 mL/min/1.73 m 2   D-Dimer    Collection Time: 11/30/20 11:46 AM   Result Value Ref Range     D-Dimer Screen 0.83 (H) 0.00 - 0.50 ug/mL (FEU)   CRP Quantitative (Non-Cardiac)    Collection Time: 11/30/20 11:46 AM   Result Value Ref Range    Stat C-Reactive Protein 17.54 (H) 0.00 - 0.75 mg/dL   Procalcitonin    Collection Time: 11/30/20 11:46 AM   Result Value Ref Range    Procalcitonin 0.31 (H) <0.25 ng/mL       (click the triangle to expand results)    Imaging:  DX-CHEST-PORTABLE (1 VIEW)   Final Result      Mild lower lung zone opacification compatible with Covid pneumonia          EKG:   per my independant read:  QTc: 428, HR: 108, sinus tach, no ST/T changes    Assessment/Plan:  I anticipate this patient will require at least two midnights for appropriate medical management, necessitating inpatient admission.  Given hypoxia as well as acute on chronic renal failure.    * COVID-19 virus infection- (present on admission)  Assessment & Plan  With hypoxia  Starting on dexamethasone 6 mg for 10 days  Not currently qualify for remdesivir    Acute hypoxemic respiratory failure (HCC)- (present on admission)  Assessment & Plan  Due to Covid    Vitamin D deficiency- (present on admission)  Assessment & Plan  Last checked 8/14/2020, level was 34  Follow-up with PCP for continued monitoring/supplementation as needed    Acute renal failure superimposed on stage 3 chronic kidney disease (HCC)- (present on admission)  Assessment & Plan  Likely due to Covid/dehydration  Giving IV fluids and monitoring    Diabetes mellitus due to underlying condition, uncontrolled, with hyperglycemia (HCC)- (present on admission)  Assessment & Plan  Hold outpatient semaglutide  Sliding scale insulin while inpatient    History of gout- (present on admission)  Assessment & Plan  Normally takes colchicine as needed    Obesity (BMI 30-39.9)- (present on admission)  Assessment & Plan  Body mass index is 38.33 kg/m².      YOVANA on CPAP- (present on admission)  Assessment & Plan  Will consider starting nocturnal CPAP if patient can bring in his  own equipment    HTN (hypertension)- (present on admission)  Assessment & Plan  Holding lisinopril now given ACKD  Added PRN BP meds      VTE prophylaxis:  sc heparin

## 2020-11-30 NOTE — DISCHARGE PLANNING
Care Transition Team Assessment    Information Source  Orientation : Oriented x 4  Information Given By: Patient  Informant's Name: Timothy         Elopement Risk  Legal Hold: No  Ambulatory or Self Mobile in Wheelchair: No-Not an Elopement Risk    Interdisciplinary Discharge Planning  Primary Care Physician: Dr Jimenez  Lives with - Patient's Self Care Capacity: Spouse  Support Systems: Spouse / Significant Other  Housing / Facility: 2 Story Apartment / Condo  Name of Care Facility: ([address] [place] [address] home on second floor)  Mobility Issues: No  Durable Medical Equipment: Not Applicable              Finances  Prescription Coverage: Yes([employer])    Vision / Hearing Impairment  Vision Impairment : Yes              Domestic Abuse  Have you ever been the victim of abuse or violence?: No

## 2020-11-30 NOTE — ED NOTES
Med Rec complete per phone interview with Pt  Allergies Reviewed  No ABX in the last 14 days    Pt takes ASPRIN

## 2020-11-30 NOTE — ED PROVIDER NOTES
ED Provider Note    CHIEF COMPLAINT  Shortness of breath    HPI  Pankaj Limon is a 55 y.o. male who presents to the emergency department with a history of diabetes and hypertension, he was diagnosed with Covid on November 24.  He comes in because he is hypoxic and more short of breath.  As the hospitalist is in the emergency department he will be seen directly by the hospitalist    REVIEW OF SYSTEMS  Positiv not obtained  PAST MEDICAL HISTORY   has a past medical history of Chickenpox, Diabetes (HCC), ED (erectile dysfunction), HTN (hypertension), Obesity, YOVANA on CPAP, and Sleep apnea.    SOCIAL HISTORY  Social History     Tobacco Use   • Smoking status: Never Smoker   • Smokeless tobacco: Never Used   Substance and Sexual Activity   • Alcohol use: Yes     Alcohol/week: 0.0 oz     Comment: rare    • Drug use: No   • Sexual activity: Yes     Partners: Female       SURGICAL HISTORY   has a past surgical history that includes colonoscopy with polyp (2013); other; arthroscopy, knee; colonoscopy with polyp (03/28/2019); and sinuscope.    CURRENT MEDICATIONS  Reviewed.  See Encounter Summary.    ALLERGIES  No Known Allergies    PHYSICAL EXAM patient was seen from outside of the room  VITAL SIGNS: /96   Pulse (!) 117   Temp 36.3 °C (97.3 °F) (Temporal)   Resp 20   Ht 1.829 m (6')   Wt (!) 128.2 kg (282 lb 10.1 oz)   SpO2 93%   BMI 38.33 kg/m²   Constitutional: Alert in no apparent distress.  HENT: Normocephalic, Bilateral external ears normal. Nose normal.   Eyes: Pupils are equal and reactive. Conjunctiva normal, non-icteric.   Thorax & Lungs: Easy unlabored respirations  Abdomen:  No gross signs of peritonitis, no pain with movement   Skin: Visualized skin is  Dry, No erythema, No rash.   Extremities:   No edema, No asymmetry  Neurologic: Alert, Grossly non-focal.   Psychiatric: Affect and Mood normal      COURSE & MEDICAL DECISION MAKING  Nursing notes and vital signs were reviewed. (See chart for  details)    The patient presents to the Emergency Department with known Covid pneumonia now with hypoxemia he will be seen by the hospitalist for admission    DX-CHEST-PORTABLE (1 VIEW)   Final Result      Mild lower lung zone opacification compatible with Covid pneumonia        Results for orders placed or performed during the hospital encounter of 11/30/20   CBC WITH DIFFERENTIAL   Result Value Ref Range    WBC 6.6 4.8 - 10.8 K/uL    RBC 4.26 (L) 4.70 - 6.10 M/uL    Hemoglobin 13.3 (L) 14.0 - 18.0 g/dL    Hematocrit 37.8 (L) 42.0 - 52.0 %    MCV 88.7 81.4 - 97.8 fL    MCH 31.2 27.0 - 33.0 pg    MCHC 35.2 33.7 - 35.3 g/dL    RDW 40.7 35.9 - 50.0 fL    Platelet Count 194 164 - 446 K/uL    MPV 9.6 9.0 - 12.9 fL    Neutrophils-Polys 81.00 (H) 44.00 - 72.00 %    Lymphocytes 12.00 (L) 22.00 - 41.00 %    Monocytes 6.50 0.00 - 13.40 %    Eosinophils 0.00 0.00 - 6.90 %    Basophils 0.20 0.00 - 1.80 %    Immature Granulocytes 0.30 0.00 - 0.90 %    Nucleated RBC 0.00 /100 WBC    Neutrophils (Absolute) 5.38 1.82 - 7.42 K/uL    Lymphs (Absolute) 0.80 (L) 1.00 - 4.80 K/uL    Monos (Absolute) 0.43 0.00 - 0.85 K/uL    Eos (Absolute) 0.00 0.00 - 0.51 K/uL    Baso (Absolute) 0.01 0.00 - 0.12 K/uL    Immature Granulocytes (abs) 0.02 0.00 - 0.11 K/uL    NRBC (Absolute) 0.00 K/uL   COMP METABOLIC PANEL   Result Value Ref Range    Sodium 129 (L) 135 - 145 mmol/L    Potassium 4.3 3.6 - 5.5 mmol/L    Chloride 96 96 - 112 mmol/L    Co2 19 (L) 20 - 33 mmol/L    Anion Gap 14.0 7.0 - 16.0    Glucose 222 (H) 65 - 99 mg/dL    Bun 33 (H) 8 - 22 mg/dL    Creatinine 2.21 (H) 0.50 - 1.40 mg/dL    Calcium 9.1 8.4 - 10.2 mg/dL    AST(SGOT) 35 12 - 45 U/L    ALT(SGPT) 19 2 - 50 U/L    Alkaline Phosphatase 60 30 - 99 U/L    Total Bilirubin 0.6 0.1 - 1.5 mg/dL    Albumin 3.4 3.2 - 4.9 g/dL    Total Protein 7.2 6.0 - 8.2 g/dL    Globulin 3.8 (H) 1.9 - 3.5 g/dL    A-G Ratio 0.9 g/dL   ESTIMATED GFR   Result Value Ref Range    GFR If  37  (A) >60 mL/min/1.73 m 2    GFR If Non  31 (A) >60 mL/min/1.73 m 2   D-Dimer   Result Value Ref Range    D-Dimer Screen 0.83 (H) 0.00 - 0.50 ug/mL (FEU)   CRP Quantitative (Non-Cardiac)   Result Value Ref Range    Stat C-Reactive Protein 17.54 (H) 0.00 - 0.75 mg/dL   Procalcitonin   Result Value Ref Range    Procalcitonin 0.31 (H) <0.25 ng/mL   EKG   Result Value Ref Range    Report       [facility] Emergency Dept.    Test Date:  2020  Pt Name:    STACEY KOWALSKI        Department: [facility]  MRN:        [id number]                      Room:  Gender:     Male                         Technician: PAVAN  :        1965                   Requested By:ER TRIAGE PROTOCOL  Order #:    [id number]                    Reading MD:    Measurements  Intervals                                Axis  Rate:       108                          P:          38  MD:         153                          QRS:        12  QRSD:       98                           T:          15  QT:         319  QTc:        428    Interpretive Statements  Sinus tachycardia  Atrial premature complex  Abnormal R-wave progression, late transition  Baseline wander in lead(s) V5  No previous ECG available for comparison         Secondary to hypoxemia, Steroids were initiated    FINAL IMPRESSION  1.  COVID-19 positive test (U07.1, COVID-19) with Acute Pneumonia (J12.89, Other viral pneumonia)  (If respiratory failure or sepsis present, add as separate assessment)      2.  Hypoxemia          Electronically signed by: Mayra Baldwin M.D., 2020 2:16 PM

## 2020-11-30 NOTE — ASSESSMENT & PLAN NOTE
Last checked 8/14/2020, level was 34  Follow-up with PCP for continued monitoring/supplementation as needed

## 2020-11-30 NOTE — ASSESSMENT & PLAN NOTE
"This note was copied from the mother's chart.     11/05/19 0915   Maternal Information   Date of Referral 11/05/19   Person Making Referral other (see comments)  (courtesy)   Maternal Reason for Referral breastfeeding currently   Maternal Assessment   Breast Size Issue none   Breast Shape Bilateral:;round   Breast Density Bilateral:;soft   Nipples Left:;flat;Right:;inverted;Bilateral:;graspable  (mild inversion to right nipple, graspable)   Maternal Infant Feeding   Maternal Emotional State anxious;assist needed   Infant Positioning clutch/football   Signs of Milk Transfer infant jaw motion present  (colostrum in shield)   Pain with Feeding other (see comments)  (\"pinching\" but nipple appeared normal upon release)   Nipple Shape After Feeding, Right round;symmetrical;appropriately projected   Latch Assistance yes   Equipment Type   Breast Pump Type double electric, personal  (in room with mom. )   Reproductive Interventions   Breast Care: Breastfeeding frequency of feeding adjusted;nipple shield utilized   Breastfeeding Assistance assisted with positioning;feeding cue recognition promoted;feeding on demand promoted;feeding session observed;infant latch-on verified;infant stimulated to wakeful state;infant suck/swallow verified;nipple shield utilized;support offered   Breastfeeding Support lactation counseling provided   Coping/Psychosocial Interventions   Parent/Child Attachment Promotion cue recognition promoted;face-to-face positioning promoted;participation in care promoted;skin-to-skin contact encouraged   Supportive Measures counseling provided     " Due to Covid

## 2020-12-01 VITALS
HEART RATE: 89 BPM | WEIGHT: 282.63 LBS | HEIGHT: 72 IN | SYSTOLIC BLOOD PRESSURE: 134 MMHG | DIASTOLIC BLOOD PRESSURE: 73 MMHG | OXYGEN SATURATION: 94 % | TEMPERATURE: 97.5 F | BODY MASS INDEX: 38.28 KG/M2 | RESPIRATION RATE: 19 BRPM

## 2020-12-01 LAB
ALBUMIN SERPL BCP-MCNC: 3.3 G/DL (ref 3.2–4.9)
ALBUMIN/GLOB SERPL: 1 G/DL
ALP SERPL-CCNC: 54 U/L (ref 30–99)
ALT SERPL-CCNC: 19 U/L (ref 2–50)
ANION GAP SERPL CALC-SCNC: 13 MMOL/L (ref 7–16)
AST SERPL-CCNC: 31 U/L (ref 12–45)
BASOPHILS # BLD AUTO: 0 % (ref 0–1.8)
BASOPHILS # BLD: 0 K/UL (ref 0–0.12)
BILIRUB SERPL-MCNC: 0.4 MG/DL (ref 0.1–1.5)
BUN SERPL-MCNC: 36 MG/DL (ref 8–22)
CALCIUM SERPL-MCNC: 9.1 MG/DL (ref 8.4–10.2)
CHLORIDE SERPL-SCNC: 100 MMOL/L (ref 96–112)
CO2 SERPL-SCNC: 22 MMOL/L (ref 20–33)
CREAT SERPL-MCNC: 1.99 MG/DL (ref 0.5–1.4)
EOSINOPHIL # BLD AUTO: 0.1 K/UL (ref 0–0.51)
EOSINOPHIL NFR BLD: 2 % (ref 0–6.9)
ERYTHROCYTE [DISTWIDTH] IN BLOOD BY AUTOMATED COUNT: 41.6 FL (ref 35.9–50)
GLOBULIN SER CALC-MCNC: 3.4 G/DL (ref 1.9–3.5)
GLUCOSE BLD-MCNC: 128 MG/DL (ref 65–99)
GLUCOSE BLD-MCNC: 348 MG/DL (ref 65–99)
GLUCOSE SERPL-MCNC: 132 MG/DL (ref 65–99)
HCT VFR BLD AUTO: 34.2 % (ref 42–52)
HGB BLD-MCNC: 11.8 G/DL (ref 14–18)
LYMPHOCYTES # BLD AUTO: 1.22 K/UL (ref 1–4.8)
LYMPHOCYTES NFR BLD: 24 % (ref 22–41)
MAGNESIUM SERPL-MCNC: 2 MG/DL (ref 1.5–2.5)
MANUAL DIFF BLD: NORMAL
MCH RBC QN AUTO: 30.9 PG (ref 27–33)
MCHC RBC AUTO-ENTMCNC: 34.5 G/DL (ref 33.7–35.3)
MCV RBC AUTO: 89.5 FL (ref 81.4–97.8)
MONOCYTES # BLD AUTO: 0.46 K/UL (ref 0–0.85)
MONOCYTES NFR BLD AUTO: 9 % (ref 0–13.4)
NEUTROPHILS # BLD AUTO: 3.32 K/UL (ref 1.82–7.42)
NEUTROPHILS NFR BLD: 64 % (ref 44–72)
NEUTS BAND NFR BLD MANUAL: 1 % (ref 0–10)
NRBC # BLD AUTO: 0 K/UL
NRBC BLD-RTO: 0 /100 WBC
PLATELET # BLD AUTO: 200 K/UL (ref 164–446)
PLATELET BLD QL SMEAR: NORMAL
PMV BLD AUTO: 9.9 FL (ref 9–12.9)
POTASSIUM SERPL-SCNC: 4.4 MMOL/L (ref 3.6–5.5)
PROT SERPL-MCNC: 6.7 G/DL (ref 6–8.2)
RBC # BLD AUTO: 3.82 M/UL (ref 4.7–6.1)
RBC BLD AUTO: NORMAL
SODIUM SERPL-SCNC: 135 MMOL/L (ref 135–145)
WBC # BLD AUTO: 5.1 K/UL (ref 4.8–10.8)

## 2020-12-01 PROCEDURE — 700102 HCHG RX REV CODE 250 W/ 637 OVERRIDE(OP): Performed by: INTERNAL MEDICINE

## 2020-12-01 PROCEDURE — 85027 COMPLETE CBC AUTOMATED: CPT

## 2020-12-01 PROCEDURE — 83735 ASSAY OF MAGNESIUM: CPT

## 2020-12-01 PROCEDURE — A9270 NON-COVERED ITEM OR SERVICE: HCPCS | Performed by: INTERNAL MEDICINE

## 2020-12-01 PROCEDURE — 80053 COMPREHEN METABOLIC PANEL: CPT

## 2020-12-01 PROCEDURE — A9270 NON-COVERED ITEM OR SERVICE: HCPCS | Performed by: HOSPITALIST

## 2020-12-01 PROCEDURE — 99239 HOSP IP/OBS DSCHRG MGMT >30: CPT | Performed by: INTERNAL MEDICINE

## 2020-12-01 PROCEDURE — 700102 HCHG RX REV CODE 250 W/ 637 OVERRIDE(OP): Performed by: HOSPITALIST

## 2020-12-01 PROCEDURE — 85007 BL SMEAR W/DIFF WBC COUNT: CPT

## 2020-12-01 PROCEDURE — 82962 GLUCOSE BLOOD TEST: CPT | Mod: 91

## 2020-12-01 PROCEDURE — 700111 HCHG RX REV CODE 636 W/ 250 OVERRIDE (IP): Performed by: HOSPITALIST

## 2020-12-01 RX ORDER — GUAIFENESIN 600 MG/1
600 TABLET, EXTENDED RELEASE ORAL EVERY 12 HOURS
Status: DISCONTINUED | OUTPATIENT
Start: 2020-12-01 | End: 2020-12-01 | Stop reason: HOSPADM

## 2020-12-01 RX ORDER — GUAIFENESIN 600 MG/1
600 TABLET, EXTENDED RELEASE ORAL EVERY 12 HOURS
Qty: 28 TAB | COMMUNITY
Start: 2020-12-01 | End: 2021-10-13

## 2020-12-01 RX ORDER — DEXAMETHASONE 6 MG/1
6 TABLET ORAL DAILY
Qty: 10 TAB | Refills: 0 | Status: SHIPPED | OUTPATIENT
Start: 2020-12-02 | End: 2021-10-13

## 2020-12-01 RX ADMIN — HEPARIN SODIUM 5000 UNITS: 5000 INJECTION, SOLUTION INTRAVENOUS; SUBCUTANEOUS at 06:00

## 2020-12-01 RX ADMIN — DEXAMETHASONE 6 MG: 4 TABLET ORAL at 05:33

## 2020-12-01 RX ADMIN — INSULIN HUMAN 10 UNITS: 100 INJECTION, SOLUTION PARENTERAL at 11:17

## 2020-12-01 RX ADMIN — GUAIFENESIN 600 MG: 600 TABLET, EXTENDED RELEASE ORAL at 11:17

## 2020-12-01 RX ADMIN — ASPIRIN 81 MG: 81 TABLET, COATED ORAL at 05:33

## 2020-12-01 RX ADMIN — ATORVASTATIN CALCIUM 20 MG: 20 TABLET, FILM COATED ORAL at 18:08

## 2020-12-01 RX ADMIN — INSULIN HUMAN 10 UNITS: 100 INJECTION, SOLUTION PARENTERAL at 18:08

## 2020-12-01 NOTE — DISCHARGE PLANNING
Received Choice form at [phone]  Agency/Facility Name: [facility]  Referral sent per Choice form @ [phone]

## 2020-12-01 NOTE — DISCHARGE SUMMARY
Discharge Summary    CHIEF COMPLAINT ON ADMISSION  No chief complaint on file.      Reason for Admission  Difficulty Breathing     Admission Date  11/30/2020    CODE STATUS  Full Code    HPI & HOSPITAL COURSE  This is a 55 y.o. male here with shortness of breath.  He had exposure to a co-worker with covid and on this hospitalization, COVID PCR came back positive.  He is saturating well on 3L oxygen and educated about the importance of proning to help with his oxygen demands.  He would like to return home to convalesce with oxygen and this has been ordered for a safe discharge.  He will complete the full 10 day course of dexamethasone also.  He has also been educated about the need for a home pulse oximeter to monitor to make sure his oxygen saturations remain above 88%.    Therefore, he is discharged in good and stable condition to home with close outpatient follow-up.    The patient recovered much more quickly than anticipated on admission.    Discharge Date  12/1/2020    FOLLOW UP ITEMS POST DISCHARGE  PCP - Dr Jimenez in 2-3 weeks    DISCHARGE DIAGNOSES  Principal Problem:    COVID-19 virus infection POA: Yes  Active Problems:    HTN (hypertension) POA: Yes    YOVANA on CPAP POA: Yes    Obesity (BMI 30-39.9) POA: Yes    History of gout POA: Yes    Diabetes mellitus due to underlying condition, uncontrolled, with hyperglycemia (HCC) POA: Yes    Acute renal failure superimposed on stage 3 chronic kidney disease (HCC) POA: Yes    Vitamin D deficiency POA: Yes    Acute hypoxemic respiratory failure (HCC) POA: Yes  Resolved Problems:    * No resolved hospital problems. *      FOLLOW UP  Future Appointments   Date Time Provider Department Center   12/23/2020  2:20 PM Melly Jimenez M.D. [facility] [address]   1/5/2021  9:00 AM ALNA Doshi. [facility] None     Melly Jimenez M.D.  [address]  [place] [address]  [phone]    In 2 weeks        MEDICATIONS ON DISCHARGE     Medication List      START taking these  medications      Instructions   dexamethasone 6 MG Tabs  Start taking on: December 2, 2020  Commonly known as: DECADRON   Take 1 Tab by mouth every day.  Dose: 6 mg     guaiFENesin  MG Tb12  Commonly known as: MUCINEX   Take 1 Tab by mouth every 12 hours.  Dose: 600 mg        CHANGE how you take these medications      Instructions   atorvastatin 20 MG Tabs  What changed: when to take this  Commonly known as: LIPITOR   TAKE 1 TABLET BY MOUTH ONCE DAILY AT BEDTIME     lisinopril 10 MG Tabs  What changed:   · when to take this  · additional instructions  Commonly known as: PRINIVIL   Take 1 Tab by mouth every day. TAKE ONE TABLET BY MOUTH ONCE DAILY  Dose: 10 mg     Ozempic (0.25 or 0.5 MG/DOSE) 2 MG/1.5ML Sopn  What changed: See the new instructions.  Generic drug: Semaglutide(0.25 or 0.5MG/DOS)   INJECT 0.25 MG AS DIRECTED EVERY 7 DAYS        CONTINUE taking these medications      Instructions   aspirin EC 81 MG Tbec  Commonly known as: ECOTRIN   Take 81 mg by mouth every morning.  Dose: 81 mg     * Blood Glucose Monitoring Suppl Ayleen   Doctor's comments: Check blood sugar once daily before breakfast.  Meter: Dispense Device of Insurance Preference. Sig. Use as directed for blood sugar monitoring. #1. NR.     * Blood Glucose Test Strips   Test strips order: Test strips for glucometer. Sig: use once daily before breakfast.     * Blood Glucose Test Strips   Test strips order: Test strips for glucometer. Sig: use  Once daily before breakfast.     colchicine 0.6 MG Tabs  Commonly known as: COLCRYS   TAKE 2 TABLETS BY MOUTH AT FIRST ONSET OF GOUT AND TAKE ANOTHER TABLET AFTER 1 HOUR     sildenafil citrate 100 MG tablet  Commonly known as: Viagra   Take 1 Tab by mouth as needed for Erectile Dysfunction.  Dose: 100 mg     vitamin D 2000 UNIT Tabs   Take 6,000 Units by mouth every morning. 3 tablets = 6000 units  Dose: 6,000 Units         * This list has 3 medication(s) that are the same as other medications prescribed  for you. Read the directions carefully, and ask your doctor or other care provider to review them with you.                Allergies  No Known Allergies    DIET  Orders Placed This Encounter   Procedures   • Diet Order Diet: Consistent CHO (Diabetic); Second Modifier: (optional): Renal     Standing Status:   Standing     Number of Occurrences:   1     Order Specific Question:   Diet:     Answer:   Consistent CHO (Diabetic) [4]     Order Specific Question:   Second Modifier: (optional)     Answer:   Renal [8]       ACTIVITY  As tolerated.  Weight bearing as tolerated    CONSULTATIONS  none    PROCEDURES  none    LABORATORY  Lab Results   Component Value Date    SODIUM 135 12/01/2020    POTASSIUM 4.4 12/01/2020    CHLORIDE 100 12/01/2020    CO2 22 12/01/2020    GLUCOSE 132 (H) 12/01/2020    BUN 36 (H) 12/01/2020    CREATININE 1.99 (H) 12/01/2020        Lab Results   Component Value Date    WBC 5.1 12/01/2020    HEMOGLOBIN 11.8 (L) 12/01/2020    HEMATOCRIT 34.2 (L) 12/01/2020    PLATELETCT 200 12/01/2020        Total time of the discharge process exceeds 35 minutes.

## 2020-12-01 NOTE — FACE TO FACE
"Face to Face Note  -  Durable Medical Equipment    Kassidy Malone D.O. - NPI: [id number]  I certify that this patient is under my care and that they had a durable medical equipment(DME)face to face encounter by myself that meets the physician DME face-to-face encounter requirements with this patient on:    Date of encounter:   Patient:                    MRN:                       YOB: 2022  Pankaj Limon  [id number]  1965     The encounter with the patient was in whole, or in part, for the following medical condition, which is the primary reason for durable medical equipment:  Other - covid pneumonia    I certify that, based on my findings, the following durable medical equipment is medically necessary:  Oxygen.    HOME O2 Saturation Measurements:(Values must be present for Home Oxygen orders)  Room air sat at rest: 88  Room air sat with amb: 85  With liters of O2: 3, O2 sat at rest with O2: 93  With Liters of O2: 3, O2 sat with amb with O2 : 90  Is the patient mobile?: Yes    My Clinical findings support the need for the above equipment due to:  Hypoxia    Supporting Symptoms: The patient requires supplemental oxygen, as the following interventions have been tried with limited or no improvement: \"Oral and/or IV steroids, \"Ambulation with oximetry and \"Incentive spirometry    If patient feels more short of breath, they can go up to 6 liters per minute and contact healthcare provider.  "

## 2020-12-01 NOTE — FACE TO FACE
"Face to Face Note  -  Durable Medical Equipment    Kassidy Malone D.O. - NPI: [id number]  I certify that this patient is under my care and that they had a durable medical equipment(DME)face to face encounter by myself that meets the physician DME face-to-face encounter requirements with this patient on:    Date of encounter:   Patient:                    MRN:                       YOB: 2022  Pankaj Limon  [id number]  1965     The encounter with the patient was in whole, or in part, for the following medical condition, which is the primary reason for durable medical equipment:  Other - covid pneumonia    I certify that, based on my findings, the following durable medical equipment is medically necessary:  Oxygen.    HOME O2 Saturation Measurements:(Values must be present for Home Oxygen orders)  Room air sat at rest: 88     With liters of O2: 3, O2 sat at rest with O2: 93  With Liters of O2: 3, O2 sat with amb with O2 : 90  Is the patient mobile?: Yes    My Clinical findings support the need for the above equipment due to:  Hypoxia    Supporting Symptoms: The patient requires supplemental oxygen, as the following interventions have been tried with limited or no improvement: \"Oral and/or IV steroids, \"Ambulation with oximetry and \"Incentive spirometry    If patient feels more short of breath, they can go up to 6 liters per minute and contact healthcare provider.  "

## 2020-12-02 NOTE — DISCHARGE INSTRUCTIONS
Home Oxygen Use, Adult  When a medical condition keeps you from getting enough oxygen, your health care provider may instruct you to take extra oxygen at home. Your health care provider will let you know:  · When to take oxygen.  · For how long to take oxygen.  · How quickly oxygen should be delivered (flow rate), in liters per minute (LPM or L/M).  Home oxygen can be given through:  · A mask.  · A nasal cannula. This is a device or tube that goes in the nostrils.  · A transtracheal catheter. This is a small, flexible tube placed in the trachea.  · A tracheostomy. This is a surgically made opening in the trachea.  These devices are connected with tubing to an oxygen source, such as:  · A tank. Tanks hold oxygen in gas form. They must be replaced when the oxygen is used up.  · A liquid oxygen device. This holds oxygen in liquid form. It must be replaced when the oxygen is used up.  · An oxygen concentrator machine. This filters oxygen in the room. It uses electricity, so you must have a backup cylinder of oxygen in case the power goes out.  Supplies needed:  To use oxygen, you will need:  · A mask, nasal cannula, transtracheal catheter, or tracheostomy.  · An oxygen tank, a liquid oxygen device, or an oxygen concentrator.  · The tape that your health care provider recommends (optional).  If you use a transtracheal catheter and your prescribed flow rate is 1 LPM or greater, you will also need a humidifier.  Risks and complications  · Fire. This can happen if the oxygen is exposed to a heat source, flame, or spark.  · Injury to skin. This can happen if liquid oxygen touches your skin.  · Organ damage. This can happen if you get too little oxygen.  How to use oxygen  Your health care provider or a representative from your medical device company will show you how to use your oxygen device. Follow her or his instructions. The instructions may look something like this:  1. Wash your hands.  2. If you use an oxygen  "concentrator, make sure it is plugged in.  3. Place one end of the tube into the port on the tank, device, or machine.  4. Place the mask over your nose and mouth. Or, place the nasal cannula and secure it with tape if instructed. If you use a tracheostomy or transtracheal catheter, connect it to the oxygen source as directed.  5. Make sure the liter-flow setting on the machine is at the level prescribed by your health care provider.  6. Turn on the machine or adjust the knob on the tank or device to the correct liter-flow setting.  7. When you are done, turn off and unplug the machine, or turn the knob to OFF.  How to clean and care for the oxygen supplies  Nasal cannula  · Clean it with a warm, wet cloth daily or as needed.  · Wash it with a liquid soap once a week.  · Rinse it thoroughly once or twice a week.  · Replace it every 2-4 weeks.  · If you have an infection, such as a cold or pneumonia, change the cannula when you get better.  Mask  · Replace it every 2-4 weeks.  · If you have an infection, such as a cold or pneumonia, change the mask when you get better.  Humidifier bottle  · Wash the bottle between each refill:  ? Wash it with soap and warm water.  ? Rinse it thoroughly.  ? Disinfect it and its top.  ? Air-dry it.  · Make sure it is dry before you refill it.  Oxygen concentrator  · Clean the air filter at least twice a week according to directions from your home medical equipment and service company.  · Wipe down the cabinet every day. To do this:  ? Unplug the unit.  ? Wipe down the cabinet with a damp cloth.  ? Dry the cabinet.  Other equipment  · Change any extra tubing every 1-3 months.  · Follow instructions from your health care provider about taking care of any other equipment.  Safety tips  Fire safety tips    · Keep your oxygen and oxygen supplies at least 5 ft away from sources of heat, flames, and qiu at all times.  · Do not allow smoking near your oxygen. Put up \"no smoking\" signs in " your home. Avoid smoking areas when in public.  · Do not use materials that can burn (are flammable) while you use oxygen.  · When you go to a restaurant with portable oxygen, ask to be seated in the nonsmoking section.  · Keep a fire extinguisher close by. Let your fire department know that you have oxygen in your home.  · Test your home smoke detectors regularly.  Traveling  · Secure your oxygen tank in the vehicle so that it does not move around. Follow instructions from your medical device company about how to safely secure your tank.  · Make sure you have enough oxygen for the amount of time you will be away from home.  · If you are planning air travel, contact the airline to find out if they allow the use of an approved portable oxygen concentrator. You may also need documents from your health care provider and medical device company before you travel.  General safety tips  · If you use an oxygen cylinder, make sure it is in a stand or secured to an object that will not move (fixed object).  · If you use liquid oxygen, make sure its container is kept upright.  · If you use an oxygen concentrator:  ? Tell your electric company. Make sure you are given priority service in the event that your power goes out.  ? Avoid using extension cords, if possible.  Follow these instructions at home:  · Use oxygen only as told by your health care provider.  · Do not use alcohol or other drugs that make you relax (sedating drugs) unless instructed. They can slow down your breathing rate and make it hard to get in enough oxygen.  · Know how and when to order a refill of oxygen.  · Always keep a spare tank of oxygen. Plan ahead for holidays when you may not be able to get a prescription filled.  · Use water-based lubricants on your lips or nostrils. Do not use oil-based products like petroleum jelly.  · To prevent skin irritation on your cheeks or behind your ears, tuck some gauze under the tubing.  Contact a health care  provider if:  · You get headaches often.  · You have shortness of breath.  · You have a lasting cough.  · You have anxiety.  · You are sleepy all the time.  · You develop an illness that affects your breathing.  · You cannot exercise at your regular level.  · You are restless.  · You have difficult or irregular breathing, and it is getting worse.  · You have a fever.  · You have persistent redness under your nose.  Get help right away if:  · You are confused.  · You have blue lips or fingernails.  · You are struggling to breathe.  Summary  · Your health care provider or a representative from your medical device company will show you how to use your oxygen device. Follow her or his instructions.  · If you use an oxygen concentrator, make sure it is plugged in.  · Make sure the liter-flow setting on the machine is at the level prescribed by your health care provider.  · Keep your oxygen and oxygen supplies at least 5 ft away from sources of heat, flames, and qiu at all times.  This information is not intended to replace advice given to you by your health care provider. Make sure you discuss any questions you have with your health care provider.  Document Released: 03/09/2005 Document Revised: 06/06/2019 Document Reviewed: 07/11/2017  [employer] Patient Education © 2020 [employer]          Viral syndrome and Novel Coronavirus (COVID-19)       You have a viral syndrome which may include symptoms like muscle aches, fevers, chills, runny nose, cough, sneezing, sore throat, vomiting or diarrhea.  One of the potential viruses you may have is SARSCoV-2, the virus that causes COVID-19, also known as the novel coronavirus.  You may be just as likely to have a different viral infection such as the common cold or flu.  Most patients with COVID -19 have mild symptoms and recover on their own. Resting, staying hydrated, and sleeping are typically helpful.  As of today's visit, you are well enough to go home and treat your  symptoms with oral fluids, medicines for fevers, cough, pain, etc.        COVID 19 testing is not performed on most people with mild symptoms who are being discharged from the Emergency Department or Clinic.      If COVID 19 testing was performed, the results will not be available for up to 4 days.  Please DO NOT CONTACT THE EMERGENCY DEPARTMENT OR CLINIC FOR RESULTS OF THIS TEST.       You will be contacted by a member of the [facility] team with your results and for further discussion.       Please follow the precautions below:      • Stay home except to get medical care.   • As advised by the Centers for Disease Control and Prevention (CDC), we recommend you stay in your home and minimize contact with others to avoid spreading this infection.   • The elderly or anyone with significant medical issues may have more severe symptoms from this infection. We recommend separation, also known as self-isolation, for at least 7 days after your first day of symptoms and several more after that if you are still sick. The most important action is wait for at least  a week and several more days after you feel well before returning to you regular activities, work or school. If you become sicker, like difficulty breathing, chest pain, you are unable to eat or drink enough, or have severe vomiting, diarrhea or weakness, you may need to return to the Emergency Department or contact your clinic provider for re-evaluation.    • You should restrict activities outside your home, except for getting medical care. Do not go to work, school, or public areas. Avoid using public transportation, ride-sharing, or taxis.   • Separate yourself from other people and animals in your home.   • As much as possible, you should stay in a specific room and away from other people in your home. Also, you should use a separate bathroom, if available.   • Avoid sharing personal household items. You should not share dishes, drinking glasses, cups,  "eating utensils, towels, or bedding with other people in your home. After using these items, they should be washed thoroughly with soap and water.         Precautions continued:    • Clean all \"high-touch\" surfaces every day high touch surfaces include counters, tabletops, doorknobs, bathroom fixtures, toilets, phones, keyboards, tablets, and bedside tables. Also, clean any surfaces that may have blood, stool, or body fluids on them. Use a household cleaning   spray or wipe, according to the label instructions. Labels contain instructions for safe and effective use of the cleaning product including precautions you should take when applying the product, such as wearing gloves and making sure you have good ventilation during use of the product.   • Clean your hands often. Wash your hands often with soap and water for at least 20 seconds. If soap and water are not available, clean your hands with an alcohol-based hand  that contains at least 60% alcohol, covering all surfaces of your hands and rubbing them together until they feel dry. Soap and water should be used preferentially if hands are visibly dirty. Avoid touching your eyes, nose, and mouth with unwashed hands.   • Cover your coughs and sneezes    • Cover your mouth and nose with a tissue when you cough or sneeze   • Throw used tissues in a lined trash can; immediately wash your hands with soap and water for at least 20 seconds or clean your hands with an alcohol-based hand  that contains at least 60 to 95% alcohol, covering all surfaces of your hands and rubbing them together until they feel dry. Soap and water should be used preferentially if hands are visibly dirty.     • When seeking care at a healthcare facility:    · Seek prompt medical attention if your illness is worsening (e.g., difficulty breathing).    · Put on a facemask before you enter the facility.    · These steps will help the healthcare provider's office to keep other people " in the office or waiting room from getting infected or exposed.    · If possible, put on a facemask before emergency medical services arrive.      Please see the resources below for more information.      CDC Corona Website https://www.cdc.gov/coronavirus/2019-ncov/index.html    General Information https://www.cdc.gov/coronavirus/2019-ncov/faq.html      [facility]: [phone]     [facility] Line [phone] Instructions    Discharged to home by car with relative. Discharged via wheelchair, hospital escort: Yes.  Special equipment needed: Oxygen    Be sure to schedule a follow-up appointment with your primary care doctor or any specialists as instructed.     Discharge Plan:   Influenza Vaccine Indication: Not indicated: Previously immunized this influenza season and > 8 years of age    I understand that a diet low in cholesterol, fat, and sodium is recommended for good health. Unless I have been given specific instructions below for another diet, I accept this instruction as my diet prescription.   Other diet: Diabetic    Special Instructions: None    · Is patient discharged on Warfarin / Coumadin?   No     Depression / Suicide Risk    As you are discharged from this [facility], it is important to learn how to keep safe from harming yourself.    Recognize the warning signs:  · Abrupt changes in personality, positive or negative- including increase in energy   · Giving away possessions  · Change in eating patterns- significant weight changes-  positive or negative  · Change in sleeping patterns- unable to sleep or sleeping all the time   · Unwillingness or inability to communicate  · Depression  · Unusual sadness, discouragement and loneliness  · Talk of wanting to die  · Neglect of personal appearance   · Rebelliousness- reckless behavior  · Withdrawal from people/activities they love  · Confusion- inability to concentrate     If you or a loved one observes any of  these behaviors or has concerns about self-harm, here's what you can do:  · Talk about it- your feelings and reasons for harming yourself  · Remove any means that you might use to hurt yourself (examples: pills, rope, extension cords, firearm)  · Get professional help from the community (Mental Health, Substance Abuse, psychological counseling)  · Do not be alone:Call your Safe Contact- someone whom you trust who will be there for you.  · Call your local CRISIS HOTLINE 086-2847 or 742-699-1953  · Call your local Children's Mobile Crisis Response Team [place] (398) 042-8072 or www.[employer]  · Call the toll free National Suicide Prevention Hotlines   · National Suicide Prevention Lifeline 765-329-QNLX (8810)  · National Hope Line Network 800-SUICIDE (942-6639)

## 2020-12-09 ENCOUNTER — TELEMEDICINE (OUTPATIENT)
Dept: MEDICAL GROUP | Facility: PHYSICIAN GROUP | Age: 55
End: 2020-12-09
Payer: COMMERCIAL

## 2020-12-09 DIAGNOSIS — J96.01 ACUTE RESPIRATORY FAILURE WITH HYPOXIA (HCC): ICD-10-CM

## 2020-12-09 DIAGNOSIS — U07.1 PNEUMONIA DUE TO COVID-19 VIRUS: ICD-10-CM

## 2020-12-09 DIAGNOSIS — J12.82 PNEUMONIA DUE TO COVID-19 VIRUS: ICD-10-CM

## 2020-12-09 LAB — GLUCOSE BLD-MCNC: 338 MG/DL (ref 65–99)

## 2020-12-09 PROCEDURE — 99214 OFFICE O/P EST MOD 30 MIN: CPT | Mod: 95,CR | Performed by: FAMILY MEDICINE

## 2020-12-09 RX ORDER — SEMAGLUTIDE 1.34 MG/ML
0.5 INJECTION, SOLUTION SUBCUTANEOUS
Qty: 1.5 ML | Refills: 5 | Status: SHIPPED | OUTPATIENT
Start: 2020-12-13 | End: 2021-05-26

## 2020-12-09 NOTE — PROGRESS NOTES
Virtual Visit: Established Patient   This visit was conducted via Zoom using secure and encrypted videoconferencing technology. The patient was in a private location in the [facility].    The patient's identity was confirmed and verbal consent was obtained for this virtual visit.    Subjective:   CC:   Chief Complaint   Patient presents with   • Hospital Follow-up       Pankaj Limon is a 55 y.o. male presenting for evaluation and management of:    Patient is here for follow-up of recent hospitalization.  He was hospitalized at [facility] from 11/30-12/1/2020 for shortness of breath.  He said he was already having low energy level, could not eat because he did not have appetite a week before the hospitalization.  He did not have any other symptoms.  He said he lost about 20 pounds.  He was found to be hypoxic and his chest x-ray was consistent with Covid pneumonia.  He was treated with oxygen and steroids.  He improved and was sent home on 3 L of oxygen and dexamethasone to complete 10-day course.  He said he only uses the oxygen when he is exerting.  When he is at rest his pulse ox runs in the mid 90s but when he walks it goes down to about 89%.  He said he went back to work.  He said he is the only one in this office so he is isolated.  He said he is not needing oxygen when he is at work.  He still has shortness of breath with exertion otherwise no cough, and no other symptoms.  He said his appetite is back.      In terms of his diabetes mellitus type 2, his last hemoglobin A1c increased from 6.9-9.6 in August 2020.  We increase the dose of Ozempic to 0.5 mg weekly.  He is tolerating the Ozempic.    ROS   Denies any recent fevers or chills. No nausea or vomiting. No chest pains.  Positive exertional shortness of breath.    No Known Allergies    Current medicines (including changes today)  Current Outpatient Medications   Medication Sig Dispense Refill   • [START ON 12/13/2020]  Semaglutide,0.25 or 0.5MG/DOS, (OZEMPIC, 0.25 OR 0.5 MG/DOSE,) 2 MG/1.5ML Solution Pen-injector Inject 0.5 mg under the skin every Sunday. 1.5 mL 5   • dexamethasone (DECADRON) 6 MG Tab Take 1 Tab by mouth every day. 10 Tab 0   • guaiFENesin ER (MUCINEX) 600 MG TABLET SR 12 HR Take 1 Tab by mouth every 12 hours. 28 Tab    • aspirin EC (ECOTRIN) 81 MG Tablet Delayed Response Take 81 mg by mouth every morning.     • atorvastatin (LIPITOR) 20 MG Tab TAKE 1 TABLET BY MOUTH ONCE DAILY AT BEDTIME (Patient taking differently: Take 20 mg by mouth every bedtime.) 90 Tab 1   • colchicine (COLCRYS) 0.6 MG Tab TAKE 2 TABLETS BY MOUTH AT FIRST ONSET OF GOUT AND TAKE ANOTHER TABLET AFTER 1 HOUR 30 Tab 1   • lisinopril (PRINIVIL) 10 MG Tab Take 1 Tab by mouth every day. TAKE ONE TABLET BY MOUTH ONCE DAILY (Patient taking differently: Take 10 mg by mouth every morning.) 90 Tab 3   • sildenafil citrate (VIAGRA) 100 MG tablet Take 1 Tab by mouth as needed for Erectile Dysfunction. 90 Tab 1   • Cholecalciferol (VITAMIN D) 2000 UNIT Tab Take 6,000 Units by mouth every morning. 3 tablets = 6000 units     • Blood Glucose Monitoring Suppl Device Meter: Dispense Device of Insurance Preference. Sig. Use as directed for blood sugar monitoring. #1. NR. 1 Device 0   • Blood Glucose Test Strips Test strips order: Test strips for glucometer. Sig: use once daily before breakfast. 50 Strip 5   • Blood Glucose Test Strips Test strips order: Test strips for glucometer. Sig: use  Once daily before breakfast. 50 Strip 5     No current facility-administered medications for this visit.        Patient Active Problem List    Diagnosis Date Noted   • COVID-19 virus infection 11/30/2020   • Acute hypoxemic respiratory failure (HCC) 11/30/2020   • Acute renal failure superimposed on stage 3 chronic kidney disease (HCC) 05/24/2019   • Vitamin D deficiency 05/24/2019   • Gout 05/02/2019   • Diabetes mellitus due to underlying condition, uncontrolled, with  hyperglycemia (HCC) 05/02/2019   • History of gout 04/01/2019   • Weight gain 04/01/2019   • Chronic fatigue 04/01/2019   • Obesity (BMI 30-39.9) 05/04/2017   • YOVANA on CPAP    • HTN (hypertension)    • ED (erectile dysfunction)        Family History   Problem Relation Age of Onset   • Diabetes Father    • Hyperlipidemia Father    • Stroke Father    • Sleep Apnea Neg Hx        He  has a past medical history of Chickenpox, Diabetes (HCC), ED (erectile dysfunction), HTN (hypertension), Obesity, YOVANA on CPAP, and Sleep apnea.  He  has a past surgical history that includes colonoscopy with polyp (2013); other; arthroscopy, knee; colonoscopy with polyp (03/28/2019); and sinuscope.       Objective:   There were no vitals taken for this visit.    Physical Exam:  Constitutional: Alert, no distress, well-groomed.  Skin: No rashes in visible areas.  Eye: Round. Conjunctiva clear, lids normal. No icterus.   ENMT: Lips pink without lesions, good dentition, moist mucous membranes. Phonation normal.  Neck: No masses, no thyromegaly. Moves freely without pain.  Respiratory: Unlabored respiratory effort, no cough or audible wheeze  Psych: Alert and oriented x3, normal affect and mood.       Assessment and Plan:   The following treatment plan was discussed:     1. Acute respiratory failure with hypoxia (HCC)    2. Pneumonia due to COVID-19 virus    3. Uncontrolled type 2 diabetes mellitus with microalbuminuria, without long-term current use of insulin (HCC)  - Semaglutide,0.25 or 0.5MG/DOS, (OZEMPIC, 0.25 OR 0.5 MG/DOSE,) 2 MG/1.5ML Solution Pen-injector; Inject 0.5 mg under the skin every Sunday.  Dispense: 1.5 mL; Refill: 5    He was hospitalized for acute respiratory failure with hypoxia due to COVID-19 pneumonia. He was sent home on 3 L of oxygen.  He is saturating very well in the mid 90s at rest.  Pulse ox drops to 89 with exertion.  Patient has improved significantly and is on the road to full recovery.  Advised to continue to  use oxygen when needed.  I told him that if his oxygen stays 90% or higher with exertion that means he will not need to use his oxygen anymore.  He has a follow-up appointment with me in 2 weeks and we will reevaluate at that time.  We will see what his pulse ox is running with walking at that time.  He will finish the course of dexamethasone.  Made him aware that this can increase his blood sugar level but once he is off the medication his blood sugar numbers should go down.  He said he only has 2 days left of the dexamethasone.  He will continue taking the Ozempic 0.5 mg weekly.  He has follow-up blood work but he will do when he sees me for his appointment in 2 weeks and we will go over results at that time.    Follow-up: Keep appointment as scheduled.      Please note that this dictation was created using voice recognition software. I have worked with consultants from the vendor as well as technical experts from [facility] to optimize the interface. I have made every reasonable attempt to correct obvious errors, but I expect that there are errors of grammar and possibly content I did not discover before finalizing the note.

## 2020-12-23 ENCOUNTER — OFFICE VISIT (OUTPATIENT)
Dept: MEDICAL GROUP | Facility: PHYSICIAN GROUP | Age: 55
End: 2020-12-23
Payer: COMMERCIAL

## 2020-12-23 ENCOUNTER — HOSPITAL ENCOUNTER (OUTPATIENT)
Dept: LAB | Facility: MEDICAL CENTER | Age: 55
End: 2020-12-23
Attending: FAMILY MEDICINE
Payer: COMMERCIAL

## 2020-12-23 VITALS
WEIGHT: 283.95 LBS | HEIGHT: 72 IN | BODY MASS INDEX: 38.46 KG/M2 | OXYGEN SATURATION: 93 % | HEART RATE: 107 BPM | DIASTOLIC BLOOD PRESSURE: 60 MMHG | TEMPERATURE: 97.3 F | SYSTOLIC BLOOD PRESSURE: 122 MMHG

## 2020-12-23 DIAGNOSIS — J96.01 ACUTE RESPIRATORY FAILURE WITH HYPOXIA (HCC): ICD-10-CM

## 2020-12-23 DIAGNOSIS — I10 ESSENTIAL HYPERTENSION: ICD-10-CM

## 2020-12-23 DIAGNOSIS — E78.5 DYSLIPIDEMIA: ICD-10-CM

## 2020-12-23 DIAGNOSIS — J12.82 PNEUMONIA DUE TO COVID-19 VIRUS: ICD-10-CM

## 2020-12-23 DIAGNOSIS — N18.31 STAGE 3A CHRONIC KIDNEY DISEASE: ICD-10-CM

## 2020-12-23 DIAGNOSIS — U07.1 PNEUMONIA DUE TO COVID-19 VIRUS: ICD-10-CM

## 2020-12-23 DIAGNOSIS — E55.9 VITAMIN D DEFICIENCY: ICD-10-CM

## 2020-12-23 DIAGNOSIS — M79.671 FOOT PAIN, BILATERAL: ICD-10-CM

## 2020-12-23 DIAGNOSIS — L82.1 SEBORRHEIC KERATOSES: ICD-10-CM

## 2020-12-23 DIAGNOSIS — Z12.5 SCREENING FOR PROSTATE CANCER: ICD-10-CM

## 2020-12-23 DIAGNOSIS — M79.672 FOOT PAIN, BILATERAL: ICD-10-CM

## 2020-12-23 DIAGNOSIS — N18.30 TYPE 2 DIABETES MELLITUS WITH STAGE 3 CHRONIC KIDNEY DISEASE, WITHOUT LONG-TERM CURRENT USE OF INSULIN (HCC): ICD-10-CM

## 2020-12-23 DIAGNOSIS — R23.8 PAPULE: ICD-10-CM

## 2020-12-23 DIAGNOSIS — E11.22 TYPE 2 DIABETES MELLITUS WITH STAGE 3 CHRONIC KIDNEY DISEASE, WITHOUT LONG-TERM CURRENT USE OF INSULIN (HCC): ICD-10-CM

## 2020-12-23 DIAGNOSIS — N18.30 STAGE 3 CHRONIC KIDNEY DISEASE: ICD-10-CM

## 2020-12-23 LAB
25(OH)D3 SERPL-MCNC: 43 NG/ML (ref 30–100)
ALBUMIN SERPL BCP-MCNC: 3.6 G/DL (ref 3.2–4.9)
ALBUMIN/GLOB SERPL: 1.4 G/DL
ALP SERPL-CCNC: 67 U/L (ref 30–99)
ALT SERPL-CCNC: 21 U/L (ref 2–50)
ANION GAP SERPL CALC-SCNC: 8 MMOL/L (ref 7–16)
AST SERPL-CCNC: 21 U/L (ref 12–45)
BASOPHILS # BLD AUTO: 0.9 % (ref 0–1.8)
BASOPHILS # BLD: 0.04 K/UL (ref 0–0.12)
BILIRUB SERPL-MCNC: 0.8 MG/DL (ref 0.1–1.5)
BUN SERPL-MCNC: 20 MG/DL (ref 8–22)
CALCIUM SERPL-MCNC: 9.2 MG/DL (ref 8.5–10.5)
CHLORIDE SERPL-SCNC: 101 MMOL/L (ref 96–112)
CHOLEST SERPL-MCNC: 153 MG/DL (ref 100–199)
CO2 SERPL-SCNC: 24 MMOL/L (ref 20–33)
CREAT SERPL-MCNC: 1.6 MG/DL (ref 0.5–1.4)
CREAT UR-MCNC: 147.39 MG/DL
EOSINOPHIL # BLD AUTO: 0.24 K/UL (ref 0–0.51)
EOSINOPHIL NFR BLD: 5.2 % (ref 0–6.9)
ERYTHROCYTE [DISTWIDTH] IN BLOOD BY AUTOMATED COUNT: 44.6 FL (ref 35.9–50)
EST. AVERAGE GLUCOSE BLD GHB EST-MCNC: 312 MG/DL
FASTING STATUS PATIENT QL REPORTED: NORMAL
GLOBULIN SER CALC-MCNC: 2.6 G/DL (ref 1.9–3.5)
GLUCOSE SERPL-MCNC: 272 MG/DL (ref 65–99)
HBA1C MFR BLD: 12.5 % (ref 0–5.6)
HCT VFR BLD AUTO: 31.7 % (ref 42–52)
HDLC SERPL-MCNC: 39 MG/DL
HGB BLD-MCNC: 10.7 G/DL (ref 14–18)
IMM GRANULOCYTES # BLD AUTO: 0.01 K/UL (ref 0–0.11)
IMM GRANULOCYTES NFR BLD AUTO: 0.2 % (ref 0–0.9)
LDLC SERPL CALC-MCNC: 79 MG/DL
LYMPHOCYTES # BLD AUTO: 1.34 K/UL (ref 1–4.8)
LYMPHOCYTES NFR BLD: 29.1 % (ref 22–41)
MCH RBC QN AUTO: 30.8 PG (ref 27–33)
MCHC RBC AUTO-ENTMCNC: 33.8 G/DL (ref 33.7–35.3)
MCV RBC AUTO: 91.4 FL (ref 81.4–97.8)
MICROALBUMIN UR-MCNC: 45 MG/DL
MICROALBUMIN/CREAT UR: 305 MG/G (ref 0–30)
MONOCYTES # BLD AUTO: 0.41 K/UL (ref 0–0.85)
MONOCYTES NFR BLD AUTO: 8.9 % (ref 0–13.4)
NEUTROPHILS # BLD AUTO: 2.57 K/UL (ref 1.82–7.42)
NEUTROPHILS NFR BLD: 55.7 % (ref 44–72)
NRBC # BLD AUTO: 0 K/UL
NRBC BLD-RTO: 0 /100 WBC
PLATELET # BLD AUTO: 134 K/UL (ref 164–446)
PMV BLD AUTO: 10.1 FL (ref 9–12.9)
POTASSIUM SERPL-SCNC: 4.5 MMOL/L (ref 3.6–5.5)
PROT SERPL-MCNC: 6.2 G/DL (ref 6–8.2)
PSA SERPL-MCNC: 0.64 NG/ML (ref 0–4)
RBC # BLD AUTO: 3.47 M/UL (ref 4.7–6.1)
SODIUM SERPL-SCNC: 133 MMOL/L (ref 135–145)
TRIGL SERPL-MCNC: 176 MG/DL (ref 0–149)
WBC # BLD AUTO: 4.6 K/UL (ref 4.8–10.8)

## 2020-12-23 PROCEDURE — 99214 OFFICE O/P EST MOD 30 MIN: CPT | Performed by: FAMILY MEDICINE

## 2020-12-23 PROCEDURE — 80061 LIPID PANEL: CPT

## 2020-12-23 PROCEDURE — 85025 COMPLETE CBC W/AUTO DIFF WBC: CPT

## 2020-12-23 PROCEDURE — 36415 COLL VENOUS BLD VENIPUNCTURE: CPT

## 2020-12-23 PROCEDURE — 82043 UR ALBUMIN QUANTITATIVE: CPT

## 2020-12-23 PROCEDURE — 82570 ASSAY OF URINE CREATININE: CPT

## 2020-12-23 PROCEDURE — 84153 ASSAY OF PSA TOTAL: CPT

## 2020-12-23 PROCEDURE — 83036 HEMOGLOBIN GLYCOSYLATED A1C: CPT

## 2020-12-23 PROCEDURE — 80053 COMPREHEN METABOLIC PANEL: CPT

## 2020-12-23 PROCEDURE — 82306 VITAMIN D 25 HYDROXY: CPT

## 2020-12-23 ASSESSMENT — PATIENT HEALTH QUESTIONNAIRE - PHQ9
CLINICAL INTERPRETATION OF PHQ2 SCORE: 1
SUM OF ALL RESPONSES TO PHQ QUESTIONS 1-9: 5
5. POOR APPETITE OR OVEREATING: 2 - MORE THAN HALF THE DAYS

## 2020-12-23 ASSESSMENT — FIBROSIS 4 INDEX: FIB4 SCORE: 1.95

## 2020-12-23 NOTE — LETTER
[facility]  Melly Jimenez M.D.  [address]  [place] [address]  Fax: [phone]   Authorization for Release/Disclosure of   Protected Health Information   Name: STACEY KOWALSKI : 1965 SSN: [id number]   Address: [address]  [address]  [place] [address] Phone:    [phone] (home)    I authorize the entity listed below to release/disclose the PHI below to:   [facility]/Melly Jimenez M.D. and Melly Jimenez M.D.   Provider or Entity Name:     Address   City, State, [facility]   Phone:      Fax:     Reason for request: continuity of care   Information to be released:    [  ] LAST COLONOSCOPY,  including any PATH REPORT and follow-up  [  ] LAST FIT/COLOGUARD RESULT [  ] LAST DEXA  [  ] LAST MAMMOGRAM  [  ] LAST PAP  [  ] LAST LABS [x  ] RETINA EXAM REPORT  [  ] IMMUNIZATION RECORDS  [  ] Release all info      [  ] Check here and initial the line next to each item to release ALL health information INCLUDING  _____ Care and treatment for drug and / or alcohol abuse  _____ HIV testing, infection status, or AIDS  _____ Genetic Testing    DATES OF SERVICE OR TIME PERIOD TO BE DISCLOSED: _____________  I understand and acknowledge that:  * This Authorization may be revoked at any time by you in writing, except if your health information has already been used or disclosed.  * Your health information that will be used or disclosed as a result of you signing this authorization could be re-disclosed by the recipient. If this occurs, your re-disclosed health information may no longer be protected by State or Federal laws.  * You may refuse to sign this Authorization. Your refusal will not affect your ability to obtain treatment.  * This Authorization becomes effective upon signing and will  on (date) __________.      If no date is indicated, this Authorization will  one (1) year from the signature date.    Name: Stacey Kowalski    Signature:   Date:     2020       PLEASE  FAX REQUESTED RECORDS BACK TO: [phone]

## 2020-12-23 NOTE — LETTER
[facility]  Melly Jimenez M.D.  [address]  [place] [address]  Fax: [phone]   Authorization for Release/Disclosure of   Protected Health Information   Name: STACEY KOWALSKI : 1965 SSN: [id number]   Address: [address]  [address]  [place] [address] Phone:    [phone] (home)    I authorize the entity listed below to release/disclose the PHI below to:   [facility]/Melly Jimenez M.D. and Melly Jimenez M.D.   Provider or Entity Name:     Address   City, State, Zip   Phone:      Fax:     Reason for request: continuity of care   Information to be released:    [  ] LAST COLONOSCOPY,  including any PATH REPORT and follow-up  [  ] LAST FIT/COLOGUARD RESULT [  ] LAST DEXA  [  ] LAST MAMMOGRAM  [  ] LAST PAP  [  ] LAST LABS [  ] RETINA EXAM REPORT  [  ] IMMUNIZATION RECORDS  [  ] Release all info      [  ] Check here and initial the line next to each item to release ALL health information INCLUDING  _____ Care and treatment for drug and / or alcohol abuse  _____ HIV testing, infection status, or AIDS  _____ Genetic Testing    DATES OF SERVICE OR TIME PERIOD TO BE DISCLOSED: _____________  I understand and acknowledge that:  * This Authorization may be revoked at any time by you in writing, except if your health information has already been used or disclosed.  * Your health information that will be used or disclosed as a result of you signing this authorization could be re-disclosed by the recipient. If this occurs, your re-disclosed health information may no longer be protected by State or Federal laws.  * You may refuse to sign this Authorization. Your refusal will not affect your ability to obtain treatment.  * This Authorization becomes effective upon signing and will  on (date) __________.      If no date is indicated, this Authorization will  one (1) year from the signature date.    Name: Stacey Kowalski    Signature:   Date:     2020       PLEASE FAX REQUESTED  RECORDS BACK TO: [phone]

## 2020-12-23 NOTE — LETTER
December 24, 2020         Patient: Pankaj Limon   YOB: 1965   Date of Visit: 12/23/2020           Discontinue Home Oxygen              Melly Jimenez M.D.  Electronically Signed

## 2020-12-24 NOTE — PROGRESS NOTES
Subjective:      Bijan Limon is a 55 y.o. male who presents with follow-up of medical problems and to take care of other concerns.          HPI     Patient was originally scheduled for annual physical exam but we will postpone this and do his regular follow-up instead and take care of his multiple concerns today.    He was hospitalized for COVID-19 pneumonia with acute respiratory failure and hypoxia from 11/30-12/1/2020.  He has fully recovered.  He said he has not used his oxygen anymore for the last 1 week and his pulse ox has been staying above 90%.  He is currently without symptoms but he has lost 12 pounds due to the illness.  He would like an order to discontinue home oxygen.    We follow him for diabetes mellitus type 2.  He continues to take Ozempic 0.5 mg weekly.  Denies side effects.  He was on steroids when he had the COVID-19 pneumonia and he just finished taking the course a week ago.  He thinks this contributed to the elevation of his blood sugars.    For the dyslipidemia, he continues to take atorvastatin without myalgias.    We follow him for stage IIIa chronic kidney disease and he is also under the care of his nephrologist.  He avoids nephrotoxic agents.    He continues to take vitamin D supplementation for vitamin D deficiency.  He could not specify the dose he is on.    He wants me to check the skin growths on his neck and on the right calf.    He also complains of bilateral foot pain which is described as squeezing pain like a vice around the foot specifically the forefoot.  He already had surgery done for the first metatarsal left foot by Dr. Kendrick.    Past medical history, past surgical history, family history reviewed-no changes    Social history reviewed-no changes    Allergies reviewed-no changes    Medications reviewed-no changes    ROS     Per HPI, the rest are negative.     Objective:     /60 (BP Location: Left arm, Patient Position: Sitting, BP Cuff Size: Adult)   Pulse (!)  107   Temp 36.3 °C (97.3 °F) (Temporal)   Ht 1.829 m (6')   Wt (!) 128.8 kg (283 lb 15.2 oz)   SpO2 93%   BMI 38.51 kg/m²      Physical Exam     Examined alert, awake, oriented, not in distress    Neck-supple, no lymphadenopathy, no thyromegaly  Lungs-clear to auscultation, no rales, no wheezes  Heart-regular rate and rhythm, no murmur  Extremities-no edema, clubbing, cyanosis, scar from previous surgery along the length of the first metatarsal left foot, no bunion deformity, strong pedal pulses, no tenderness on palpation across the forefoot and the plantar aspect of the forefoot  Monofilament testing with a 10 gram force: sensation intact: He can only feel the monofilament on the mid aspect of the right foot and on the right foot he could not feel the monofilament in the left great toe  Visual Inspection: Feet without maceration, ulcers, fissures.  Pedal pulses: intact bilaterally     Skin-right side of the neck 8 mm round grayish-brown dry patch, left side of the neck 6 mm pinkish papule, right calf 11 mm round grayish-brown dry patch       Hospital Outpatient Visit on 12/23/2020   Component Date Value Ref Range Status   • Cholesterol,Tot 12/23/2020 153  100 - 199 mg/dL Final   • Triglycerides 12/23/2020 176* 0 - 149 mg/dL Final   • HDL 12/23/2020 39* >=40 mg/dL Final   • LDL 12/23/2020 79  <100 mg/dL Final   • Sodium 12/23/2020 133* 135 - 145 mmol/L Final   • Potassium 12/23/2020 4.5  3.6 - 5.5 mmol/L Final   • Chloride 12/23/2020 101  96 - 112 mmol/L Final   • Co2 12/23/2020 24  20 - 33 mmol/L Final   • Anion Gap 12/23/2020 8.0  7.0 - 16.0 Final   • Glucose 12/23/2020 272* 65 - 99 mg/dL Final   • Bun 12/23/2020 20  8 - 22 mg/dL Final   • Creatinine 12/23/2020 1.60* 0.50 - 1.40 mg/dL Final   • Calcium 12/23/2020 9.2  8.5 - 10.5 mg/dL Final   • AST(SGOT) 12/23/2020 21  12 - 45 U/L Final   • ALT(SGPT) 12/23/2020 21  2 - 50 U/L Final   • Alkaline Phosphatase 12/23/2020 67  30 - 99 U/L Final   • Total Bilirubin  12/23/2020 0.8  0.1 - 1.5 mg/dL Final   • Albumin 12/23/2020 3.6  3.2 - 4.9 g/dL Final   • Total Protein 12/23/2020 6.2  6.0 - 8.2 g/dL Final   • Globulin 12/23/2020 2.6  1.9 - 3.5 g/dL Final   • A-G Ratio 12/23/2020 1.4  g/dL Final   • Glycohemoglobin 12/23/2020 12.5* 0.0 - 5.6 % Final    Comment: Increased risk for diabetes:  5.7 -6.4%  Diabetes:  >6.4%  Glycemic control for adults with diabetes:  <7.0%  The above interpretations are per ADA guidelines.  Diagnosis  of diabetes mellitus on the basis of elevated Hemoglobin A1c  should be confirmed by repeating the Hb A1c test.     • Est Avg Glucose 12/23/2020 312  mg/dL Final    Comment: The eAG calculation is based on the A1c-Derived Daily Glucose  (ADAG) study.  See the ADA's website for additional information.     • WBC 12/23/2020 4.6* 4.8 - 10.8 K/uL Final   • RBC 12/23/2020 3.47* 4.70 - 6.10 M/uL Final   • Hemoglobin 12/23/2020 10.7* 14.0 - 18.0 g/dL Final   • Hematocrit 12/23/2020 31.7* 42.0 - 52.0 % Final   • MCV 12/23/2020 91.4  81.4 - 97.8 fL Final   • MCH 12/23/2020 30.8  27.0 - 33.0 pg Final   • MCHC 12/23/2020 33.8  33.7 - 35.3 g/dL Final   • RDW 12/23/2020 44.6  35.9 - 50.0 fL Final   • Platelet Count 12/23/2020 134* 164 - 446 K/uL Final   • MPV 12/23/2020 10.1  9.0 - 12.9 fL Final   • Neutrophils-Polys 12/23/2020 55.70  44.00 - 72.00 % Final   • Lymphocytes 12/23/2020 29.10  22.00 - 41.00 % Final   • Monocytes 12/23/2020 8.90  0.00 - 13.40 % Final   • Eosinophils 12/23/2020 5.20  0.00 - 6.90 % Final   • Basophils 12/23/2020 0.90  0.00 - 1.80 % Final   • Immature Granulocytes 12/23/2020 0.20  0.00 - 0.90 % Final   • Nucleated RBC 12/23/2020 0.00  /100 WBC Final   • Neutrophils (Absolute) 12/23/2020 2.57  1.82 - 7.42 K/uL Final    Includes immature neutrophils, if present.   • Lymphs (Absolute) 12/23/2020 1.34  1.00 - 4.80 K/uL Final   • Monos (Absolute) 12/23/2020 0.41  0.00 - 0.85 K/uL Final   • Eos (Absolute) 12/23/2020 0.24  0.00 - 0.51 K/uL Final    • Baso (Absolute) 12/23/2020 0.04  0.00 - 0.12 K/uL Final   • Immature Granulocytes (abs) 12/23/2020 0.01  0.00 - 0.11 K/uL Final   • NRBC (Absolute) 12/23/2020 0.00  K/uL Final   • 25-Hydroxy   Vitamin D 25 12/23/2020 43  30 - 100 ng/mL Final    Comment: Adult Ranges:   <20 ng/mL - Deficiency  20-29 ng/mL - Insufficiency   ng/mL - Sufficiency  Effective 3/18/2020, this electrochemiluminescence binding assay is  performed using Roche shabbir e immunoassay analyzer.  The Ele[employer]ys Vitamin D  total II assay is intended for the quantitative determination of total 25  hydroxyvitamin D in human serum and plasma. This assay is to be used as an  aid in the assessment of vitamin D sufficiency in adults.     • Prostatic Specific Antigen Tot 12/23/2020 0.64  0.00 - 4.00 ng/mL Final    Comment: Effective 3/18/2020, this assay is performed using Roche shabbir e immunoassay  analyzer. tPSA values determined on patient samples by different testing  procedures cannot be directly compared with one another and could be the  cause of erroneous medical interpretations. If there is a change in the tPSA  assay procedure used while monitoring therapy, then new baselines may need  to be established when comparing previous results with results received  after 3/17/2020.     • Creatinine, Urine 12/23/2020 147.39  mg/dL Final   • Microalbumin, Urine Random 12/23/2020 45.0  mg/dL Final    Results obtained by dilution.   • Micro Alb Creat Ratio 12/23/2020 305* 0 - 30 mg/g Final   • Fasting Status 12/23/2020 Fasting   Final   • GFR If  12/23/2020 54* >60 mL/min/1.73 m 2 Final   • GFR If Non  12/23/2020 45* >60 mL/min/1.73 m 2 Final          Assessment/Plan:        1. Pneumonia due to COVID-19 virus  Improved and resolved.  Currently asymptomatic but he is not hypoxic anymore and does not require oxygen anymore.  We will discontinue his oxygen and we will send order to [facility].  - Lipid Profile;  Future  - Comp Metabolic Panel; Future  - HEMOGLOBIN A1C; Future  - CBC WITH DIFFERENTIAL; Future    2. Acute respiratory failure with hypoxia (HCC)  Plan the same as #1.  - Lipid Profile; Future  - Comp Metabolic Panel; Future  - HEMOGLOBIN A1C; Future  - CBC WITH DIFFERENTIAL; Future    3. Uncontrolled type 2 diabetes mellitus with microalbuminuria, without long-term current use of insulin (HCC)  Hemoglobin A1c significantly increased from 9.6-12.5.  He does not want me to increase the Ozempic dose yet and he wants to work on this to get this better and recheck in 3 months.  He thinks that the recent illness that he had with COVID-19 pneumonia and being on steroids contributed to the problem.  Advised watching the diet more closely, exercise and more weight loss.  - Lipid Profile; Future  - Comp Metabolic Panel; Future  - HEMOGLOBIN A1C; Future  - CBC WITH DIFFERENTIAL; Future  - Diabetic Monofilament Lower Extremity Exam    4. Dyslipidemia  Triglycerides slightly elevated and HDL below 40 at 39.  LDL at goal.  Continue cholesterol medication.  Advise diet and exercises.  - Lipid Profile; Future  - Comp Metabolic Panel; Future  - HEMOGLOBIN A1C; Future  - CBC WITH DIFFERENTIAL; Future    5. Stage 3a chronic kidney disease  Stable kidney function.  Continue to avoid nephrotoxic agents.  Continue proper hydration.  Keep follow-up appointment with nephrologist.  - Lipid Profile; Future  - Comp Metabolic Panel; Future  - HEMOGLOBIN A1C; Future  - CBC WITH DIFFERENTIAL; Future    6. Vitamin D deficiency  Vitamin D level is normal.  Continue the same dose of vitamin D supplementation.    7. Seborrheic keratoses  Skin lesion right side of the neck and the right calf consistent with seborrheic keratosis.  Reassurance given.    8. Papule  He has a papule left side of the neck.  No features of skin cancer on exam.  We will continue to observe.    9. Foot pain, bilateral  I recommend referral back to his podiatrist and  referral was placed.  - REFERRAL TO PODIATRY    Follow-up in 3 months or sooner if needed.      Please note that this dictation was created using voice recognition software. I have worked with consultants from the vendor as well as technical experts from [facility] to optimize the interface. I have made every reasonable attempt to correct obvious errors, but I expect that there are errors of grammar and possibly content I did not discover before finalizing the note.

## 2021-03-15 DIAGNOSIS — Z23 NEED FOR VACCINATION: ICD-10-CM

## 2021-05-26 ENCOUNTER — OFFICE VISIT (OUTPATIENT)
Dept: MEDICAL GROUP | Facility: PHYSICIAN GROUP | Age: 56
End: 2021-05-26
Payer: COMMERCIAL

## 2021-05-26 VITALS
WEIGHT: 292.11 LBS | HEART RATE: 99 BPM | BODY MASS INDEX: 39.57 KG/M2 | HEIGHT: 72 IN | DIASTOLIC BLOOD PRESSURE: 70 MMHG | OXYGEN SATURATION: 93 % | TEMPERATURE: 98.2 F | SYSTOLIC BLOOD PRESSURE: 138 MMHG

## 2021-05-26 DIAGNOSIS — E78.5 DYSLIPIDEMIA: ICD-10-CM

## 2021-05-26 DIAGNOSIS — N18.31 STAGE 3A CHRONIC KIDNEY DISEASE: ICD-10-CM

## 2021-05-26 DIAGNOSIS — R80.9 MICROALBUMINURIA: ICD-10-CM

## 2021-05-26 DIAGNOSIS — Z23 NEED FOR 23-POLYVALENT PNEUMOCOCCAL POLYSACCHARIDE VACCINE: ICD-10-CM

## 2021-05-26 LAB
HBA1C MFR BLD: 9.3 % (ref 0–5.6)
INT CON NEG: NEGATIVE
INT CON POS: POSITIVE

## 2021-05-26 PROCEDURE — 83036 HEMOGLOBIN GLYCOSYLATED A1C: CPT | Performed by: FAMILY MEDICINE

## 2021-05-26 PROCEDURE — 90732 PPSV23 VACC 2 YRS+ SUBQ/IM: CPT | Performed by: FAMILY MEDICINE

## 2021-05-26 PROCEDURE — 90471 IMMUNIZATION ADMIN: CPT | Performed by: FAMILY MEDICINE

## 2021-05-26 PROCEDURE — 99214 OFFICE O/P EST MOD 30 MIN: CPT | Mod: 25 | Performed by: FAMILY MEDICINE

## 2021-05-26 RX ORDER — SEMAGLUTIDE 1.34 MG/ML
1 INJECTION, SOLUTION SUBCUTANEOUS
Qty: 1.5 ML | Refills: 5 | Status: SHIPPED | OUTPATIENT
Start: 2021-05-26 | End: 2021-11-24

## 2021-05-26 ASSESSMENT — FIBROSIS 4 INDEX: FIB4 SCORE: 1.95

## 2021-05-26 ASSESSMENT — PATIENT HEALTH QUESTIONNAIRE - PHQ9: CLINICAL INTERPRETATION OF PHQ2 SCORE: 0

## 2021-05-26 NOTE — LETTER
[facility]  Melly Jimenez M.D.  [address]  [place] [address]  Fax: [phone]   Authorization for Release/Disclosure of   Protected Health Information   Name: STACEY LIMON : 1965 SSN: [id number]   Address: [address]  [address]  [place] [address] Phone:    [phone] (home)    I authorize the entity listed below to release/disclose the PHI below to:   [employer]/Melly Jimenez M.D. and Melly Jimenez M.D.   Provider or Entity Name:    [facility]   Address   City, State, [facility]   Phone:      Fax:     Reason for request: continuity of care   Information to be released:    [  ] LAST COLONOSCOPY,  including any PATH REPORT and follow-up  [  ] LAST FIT/COLOGUARD RESULT [  ] LAST DEXA  [  ] LAST MAMMOGRAM  [  ] LAST PAP  [  ] LAST LABS [ x ] RETINA EXAM REPORT  [  ] IMMUNIZATION RECORDS  [  ] Release all info      [  ] Check here and initial the line next to each item to release ALL health information INCLUDING  _____ Care and treatment for drug and / or alcohol abuse  _____ HIV testing, infection status, or AIDS  _____ Genetic Testing    DATES OF SERVICE OR TIME PERIOD TO BE DISCLOSED: _____________  I understand and acknowledge that:  * This Authorization may be revoked at any time by you in writing, except if your health information has already been used or disclosed.  * Your health information that will be used or disclosed as a result of you signing this authorization could be re-disclosed by the recipient. If this occurs, your re-disclosed health information may no longer be protected by State or Federal laws.  * You may refuse to sign this Authorization. Your refusal will not affect your ability to obtain treatment.  * This Authorization becomes effective upon signing and will  on (date) __________.      If no date is indicated, this Authorization will  one (1) year from the signature date.    Name: Stacey Limon    Signature:   Date:     2021           PLEASE FAX REQUESTED RECORDS BACK TO: [phone]

## 2021-05-27 NOTE — PROGRESS NOTES
Subjective:      Bijan Limon is a 55 y.o. male who presents with Follow-Up            HPI     Patient returns for follow-up.  His last visit was in December 2020.    In December patient had pneumonia due to COVID-19 infection with acute respiratory failure with hypoxia.  He has fully recovered from the infection.  At that time he lost 12 pounds due to the illness.  Patient gained the weight back after that.    In terms of his diabetes mellitus type 2, he was supposed to have follow-up blood work before this visit but he has not been able to because of busy schedule at work.  He continues to take Ozempic 0.5 mg once a week.  His last hemoglobin A1c has increased from 9.6-12.5 in December 2020.    We follow him for microalbuminuria and he is already on lisinopril for this.  He has stage I 3A chronic kidney disease and his nephrologist increase the dose of the lisinopril from 5 to 10 mg daily.  Patient avoids nephrotoxic agents.    For his dyslipidemia, he continues to take atorvastatin with the last lipid panel in December 2020 came back triglycerides 176, HDL 39 and LDL 79.    He was previously seeing Dr. Vásquez for physician managed weight loss program.  His last visit was in October 2019.  At that time his weight was down to 280 pounds with BMI of 37.99.  Now his weight is at 292 pounds with BMI of 39.62.  He would like to go back and see Dr. Vásquez and he is asking for a referral.    He is due for Pneumovax 23 as well as Shingrix.  He said he just had his retinal exam yesterday [facility] and he was told there was no evidence of retinopathy.    He said overall he feels very good.    Past medical history, past surgical history, family history reviewed-no changes    Social history reviewed-no changes    Allergies reviewed-no changes    Medications reviewed-no changes    ROS     As per HPI, the rest are negative.       Objective:     /70 (BP Location: Left arm, Patient Position: Sitting, BP  Cuff Size: Adult)   Pulse 99   Temp 36.8 °C (98.2 °F) (Temporal)   Ht 1.829 m (6')   Wt (!) 132 kg (292 lb 1.8 oz)   SpO2 93%   BMI 39.62 kg/m²      Physical Exam       Examined alert, awake, oriented, not in distress    Neck-supple, no lymphadenopathy, no thyromegaly  Lungs-clear to auscultation, no rales, no wheezes  Heart-regular rate and rhythm, no murmur  Extremities-no edema, clubbing, cyanosis       POCT hemoglobin A1c 9.3     Assessment/Plan:        1. Uncontrolled type 2 diabetes mellitus with microalbuminuria, without long-term current use of insulin (HCC)  Hemoglobin A1c improved from 12.5-9.3 but not controlled.  We need to get it down below 7.0.  Increase Ozempic to 1 mg weekly.  Advised work hard on diet, exercise and weight loss.  Recheck blood work in 4 months.  - POCT Hemoglobin A1C  - Semaglutide, 1 MG/DOSE, (OZEMPIC, 1 MG/DOSE,) 2 MG/1.5ML Solution Pen-injector; Inject 1 mg under the skin every 7 days.  Dispense: 1.5 mL; Refill: 5    2. Microalbuminuria  Continue lisinopril.    3. Dyslipidemia  Continue atorvastatin.  He will do the blood work prior to his visit with me in 4 months.    4. Stage 3a chronic kidney disease (HCC)  His kidney function has improved with the last blood work in December 2020 consistent with stage IIIa chronic kidney disease.  Continue avoidance of nephrotoxic agents.  Advised proper hydration.  He is to follow-up with Dr. Najjar his nephrologist this coming August.    5. BMI 39.0-39.9,adult  He wants to be referred back to Dr. Vásquez for weight loss and referral was placed.  [facility]    6. Need for 23-polyvalent pneumococcal polysaccharide vaccine  Pneumovax 23 was given.  - Pneumococal Polysaccharide Vaccine 23-Valent =>3YO SQ/IM    He needs Shingrix and we will give this to him next visit.    Follow-up in 4 months.      Please note that this dictation was created using voice recognition software. I have worked  with consultants from the vendor as well as technical experts from [facility] to optimize the interface. I have made every reasonable attempt to correct obvious errors, but I expect that there are errors of grammar and possibly content I did not discover before finalizing the note.

## 2021-08-24 ENCOUNTER — TELEPHONE (OUTPATIENT)
Dept: NEPHROLOGY | Facility: MEDICAL CENTER | Age: 56
End: 2021-08-24

## 2021-08-24 DIAGNOSIS — N18.30 STAGE 3 CHRONIC KIDNEY DISEASE, UNSPECIFIED WHETHER STAGE 3A OR 3B CKD: ICD-10-CM

## 2021-10-01 ENCOUNTER — HOSPITAL ENCOUNTER (OUTPATIENT)
Dept: LAB | Facility: MEDICAL CENTER | Age: 56
End: 2021-10-01
Attending: INTERNAL MEDICINE
Payer: COMMERCIAL

## 2021-10-01 ENCOUNTER — HOSPITAL ENCOUNTER (OUTPATIENT)
Dept: LAB | Facility: MEDICAL CENTER | Age: 56
End: 2021-10-01
Attending: FAMILY MEDICINE
Payer: COMMERCIAL

## 2021-10-01 DIAGNOSIS — J96.01 ACUTE RESPIRATORY FAILURE WITH HYPOXIA (HCC): ICD-10-CM

## 2021-10-01 DIAGNOSIS — E78.5 DYSLIPIDEMIA: ICD-10-CM

## 2021-10-01 DIAGNOSIS — U07.1 PNEUMONIA DUE TO COVID-19 VIRUS: ICD-10-CM

## 2021-10-01 DIAGNOSIS — J12.82 PNEUMONIA DUE TO COVID-19 VIRUS: ICD-10-CM

## 2021-10-01 DIAGNOSIS — N18.30 STAGE 3 CHRONIC KIDNEY DISEASE, UNSPECIFIED WHETHER STAGE 3A OR 3B CKD: ICD-10-CM

## 2021-10-01 DIAGNOSIS — N18.31 STAGE 3A CHRONIC KIDNEY DISEASE: ICD-10-CM

## 2021-10-01 LAB
ALBUMIN SERPL BCP-MCNC: 3.8 G/DL (ref 3.2–4.9)
ALBUMIN/GLOB SERPL: 1.3 G/DL
ALP SERPL-CCNC: 62 U/L (ref 30–99)
ALT SERPL-CCNC: 18 U/L (ref 2–50)
ANION GAP SERPL CALC-SCNC: 8 MMOL/L (ref 7–16)
AST SERPL-CCNC: 30 U/L (ref 12–45)
BASOPHILS # BLD AUTO: 0.7 % (ref 0–1.8)
BASOPHILS # BLD: 0.05 K/UL (ref 0–0.12)
BILIRUB SERPL-MCNC: 0.6 MG/DL (ref 0.1–1.5)
BUN SERPL-MCNC: 20 MG/DL (ref 8–22)
CALCIUM SERPL-MCNC: 9.2 MG/DL (ref 8.5–10.5)
CHLORIDE SERPL-SCNC: 102 MMOL/L (ref 96–112)
CHOLEST SERPL-MCNC: 111 MG/DL (ref 100–199)
CO2 SERPL-SCNC: 25 MMOL/L (ref 20–33)
CREAT SERPL-MCNC: 1.93 MG/DL (ref 0.5–1.4)
CREAT UR-MCNC: 159.46 MG/DL
EOSINOPHIL # BLD AUTO: 0.23 K/UL (ref 0–0.51)
EOSINOPHIL NFR BLD: 3 % (ref 0–6.9)
ERYTHROCYTE [DISTWIDTH] IN BLOOD BY AUTOMATED COUNT: 47.7 FL (ref 35.9–50)
ERYTHROCYTE [DISTWIDTH] IN BLOOD BY AUTOMATED COUNT: 48.9 FL (ref 35.9–50)
EST. AVERAGE GLUCOSE BLD GHB EST-MCNC: 200 MG/DL
FASTING STATUS PATIENT QL REPORTED: NORMAL
GLOBULIN SER CALC-MCNC: 2.9 G/DL (ref 1.9–3.5)
GLUCOSE SERPL-MCNC: 181 MG/DL (ref 65–99)
HBA1C MFR BLD: 8.6 % (ref 4–5.6)
HCT VFR BLD AUTO: 41.8 % (ref 42–52)
HCT VFR BLD AUTO: 42.4 % (ref 42–52)
HDLC SERPL-MCNC: 33 MG/DL
HGB BLD-MCNC: 14.4 G/DL (ref 14–18)
HGB BLD-MCNC: 14.7 G/DL (ref 14–18)
IMM GRANULOCYTES # BLD AUTO: 0.03 K/UL (ref 0–0.11)
IMM GRANULOCYTES NFR BLD AUTO: 0.4 % (ref 0–0.9)
LDLC SERPL CALC-MCNC: 55 MG/DL
LYMPHOCYTES # BLD AUTO: 1.56 K/UL (ref 1–4.8)
LYMPHOCYTES NFR BLD: 20.6 % (ref 22–41)
MCH RBC QN AUTO: 31.7 PG (ref 27–33)
MCH RBC QN AUTO: 32.1 PG (ref 27–33)
MCHC RBC AUTO-ENTMCNC: 34.4 G/DL (ref 33.7–35.3)
MCHC RBC AUTO-ENTMCNC: 34.7 G/DL (ref 33.7–35.3)
MCV RBC AUTO: 92.1 FL (ref 81.4–97.8)
MCV RBC AUTO: 92.6 FL (ref 81.4–97.8)
MICROALBUMIN UR-MCNC: 66.4 MG/DL
MICROALBUMIN/CREAT UR: 416 MG/G (ref 0–30)
MONOCYTES # BLD AUTO: 0.58 K/UL (ref 0–0.85)
MONOCYTES NFR BLD AUTO: 7.6 % (ref 0–13.4)
NEUTROPHILS # BLD AUTO: 5.14 K/UL (ref 1.82–7.42)
NEUTROPHILS NFR BLD: 67.7 % (ref 44–72)
NRBC # BLD AUTO: 0 K/UL
NRBC BLD-RTO: 0 /100 WBC
PLATELET # BLD AUTO: 201 K/UL (ref 164–446)
PLATELET # BLD AUTO: 203 K/UL (ref 164–446)
PMV BLD AUTO: 10.2 FL (ref 9–12.9)
PMV BLD AUTO: 10.5 FL (ref 9–12.9)
POTASSIUM SERPL-SCNC: 4.3 MMOL/L (ref 3.6–5.5)
PROT SERPL-MCNC: 6.7 G/DL (ref 6–8.2)
RBC # BLD AUTO: 4.54 M/UL (ref 4.7–6.1)
RBC # BLD AUTO: 4.58 M/UL (ref 4.7–6.1)
SODIUM SERPL-SCNC: 135 MMOL/L (ref 135–145)
TRIGL SERPL-MCNC: 116 MG/DL (ref 0–149)
WBC # BLD AUTO: 7.3 K/UL (ref 4.8–10.8)
WBC # BLD AUTO: 7.6 K/UL (ref 4.8–10.8)

## 2021-10-01 PROCEDURE — 82043 UR ALBUMIN QUANTITATIVE: CPT

## 2021-10-01 PROCEDURE — 85027 COMPLETE CBC AUTOMATED: CPT

## 2021-10-01 PROCEDURE — 85025 COMPLETE CBC W/AUTO DIFF WBC: CPT

## 2021-10-01 PROCEDURE — 80053 COMPREHEN METABOLIC PANEL: CPT

## 2021-10-01 PROCEDURE — 82570 ASSAY OF URINE CREATININE: CPT

## 2021-10-01 PROCEDURE — 80061 LIPID PANEL: CPT

## 2021-10-01 PROCEDURE — 36415 COLL VENOUS BLD VENIPUNCTURE: CPT

## 2021-10-01 PROCEDURE — 83036 HEMOGLOBIN GLYCOSYLATED A1C: CPT

## 2021-10-07 ENCOUNTER — TELEPHONE (OUTPATIENT)
Dept: MEDICAL GROUP | Facility: PHYSICIAN GROUP | Age: 56
End: 2021-10-07

## 2021-10-07 DIAGNOSIS — N52.9 ED (ERECTILE DYSFUNCTION): ICD-10-CM

## 2021-10-07 RX ORDER — SILDENAFIL 100 MG/1
100 TABLET, FILM COATED ORAL
Qty: 90 TABLET | Refills: 0 | Status: CANCELLED | OUTPATIENT
Start: 2021-10-07

## 2021-10-07 NOTE — TELEPHONE ENCOUNTER
Pt called today to request a refill of his viagra and he would like it called in to the [facility] on [address]. Please call pt with any questions

## 2021-10-13 ENCOUNTER — HOSPITAL ENCOUNTER (OUTPATIENT)
Dept: RADIOLOGY | Facility: MEDICAL CENTER | Age: 56
End: 2021-10-13
Attending: PHYSICIAN ASSISTANT
Payer: COMMERCIAL

## 2021-10-13 ENCOUNTER — HOSPITAL ENCOUNTER (OUTPATIENT)
Dept: LAB | Facility: MEDICAL CENTER | Age: 56
End: 2021-10-13
Attending: PHYSICIAN ASSISTANT
Payer: COMMERCIAL

## 2021-10-13 ENCOUNTER — OFFICE VISIT (OUTPATIENT)
Dept: URGENT CARE | Facility: CLINIC | Age: 56
End: 2021-10-13
Payer: COMMERCIAL

## 2021-10-13 ENCOUNTER — APPOINTMENT (OUTPATIENT)
Dept: RADIOLOGY | Facility: IMAGING CENTER | Age: 56
End: 2021-10-13
Attending: PHYSICIAN ASSISTANT
Payer: COMMERCIAL

## 2021-10-13 VITALS
HEIGHT: 72 IN | TEMPERATURE: 97.9 F | RESPIRATION RATE: 16 BRPM | WEIGHT: 290 LBS | OXYGEN SATURATION: 96 % | HEART RATE: 130 BPM | BODY MASS INDEX: 39.28 KG/M2 | DIASTOLIC BLOOD PRESSURE: 90 MMHG | SYSTOLIC BLOOD PRESSURE: 152 MMHG

## 2021-10-13 DIAGNOSIS — Z87.39 HISTORY OF GOUT: ICD-10-CM

## 2021-10-13 DIAGNOSIS — M79.601 RIGHT ARM PAIN: ICD-10-CM

## 2021-10-13 DIAGNOSIS — R60.0 ARM EDEMA: ICD-10-CM

## 2021-10-13 DIAGNOSIS — N18.30 STAGE 3 CHRONIC KIDNEY DISEASE, UNSPECIFIED WHETHER STAGE 3A OR 3B CKD: ICD-10-CM

## 2021-10-13 DIAGNOSIS — M65.9 TENOSYNOVITIS OF RIGHT WRIST: ICD-10-CM

## 2021-10-13 LAB
BASOPHILS # BLD AUTO: 0.8 % (ref 0–1.8)
BASOPHILS # BLD: 0.06 K/UL (ref 0–0.12)
D DIMER PPP IA.FEU-MCNC: 0.58 UG/ML (FEU) (ref 0–0.5)
EOSINOPHIL # BLD AUTO: 0.2 K/UL (ref 0–0.51)
EOSINOPHIL NFR BLD: 2.5 % (ref 0–6.9)
ERYTHROCYTE [DISTWIDTH] IN BLOOD BY AUTOMATED COUNT: 46.4 FL (ref 35.9–50)
ERYTHROCYTE [SEDIMENTATION RATE] IN BLOOD BY WESTERGREN METHOD: 10 MM/HOUR (ref 0–20)
HCT VFR BLD AUTO: 41.4 % (ref 42–52)
HGB BLD-MCNC: 14.4 G/DL (ref 14–18)
IMM GRANULOCYTES # BLD AUTO: 0.02 K/UL (ref 0–0.11)
IMM GRANULOCYTES NFR BLD AUTO: 0.3 % (ref 0–0.9)
LYMPHOCYTES # BLD AUTO: 1.73 K/UL (ref 1–4.8)
LYMPHOCYTES NFR BLD: 21.7 % (ref 22–41)
MCH RBC QN AUTO: 31.9 PG (ref 27–33)
MCHC RBC AUTO-ENTMCNC: 34.8 G/DL (ref 33.7–35.3)
MCV RBC AUTO: 91.8 FL (ref 81.4–97.8)
MONOCYTES # BLD AUTO: 0.59 K/UL (ref 0–0.85)
MONOCYTES NFR BLD AUTO: 7.4 % (ref 0–13.4)
NEUTROPHILS # BLD AUTO: 5.37 K/UL (ref 1.82–7.42)
NEUTROPHILS NFR BLD: 67.3 % (ref 44–72)
NRBC # BLD AUTO: 0 K/UL
NRBC BLD-RTO: 0 /100 WBC
PLATELET # BLD AUTO: 254 K/UL (ref 164–446)
PMV BLD AUTO: 9.2 FL (ref 9–12.9)
RBC # BLD AUTO: 4.51 M/UL (ref 4.7–6.1)
URATE SERPL-MCNC: 8.1 MG/DL (ref 2.5–8.3)
WBC # BLD AUTO: 8 K/UL (ref 4.8–10.8)

## 2021-10-13 PROCEDURE — 85379 FIBRIN DEGRADATION QUANT: CPT

## 2021-10-13 PROCEDURE — 36415 COLL VENOUS BLD VENIPUNCTURE: CPT

## 2021-10-13 PROCEDURE — 93971 EXTREMITY STUDY: CPT | Mod: RT

## 2021-10-13 PROCEDURE — 73110 X-RAY EXAM OF WRIST: CPT | Mod: TC,RT | Performed by: PHYSICIAN ASSISTANT

## 2021-10-13 PROCEDURE — 84550 ASSAY OF BLOOD/URIC ACID: CPT

## 2021-10-13 PROCEDURE — 85025 COMPLETE CBC W/AUTO DIFF WBC: CPT

## 2021-10-13 PROCEDURE — 85652 RBC SED RATE AUTOMATED: CPT

## 2021-10-13 PROCEDURE — 99214 OFFICE O/P EST MOD 30 MIN: CPT | Performed by: PHYSICIAN ASSISTANT

## 2021-10-13 RX ORDER — SEMAGLUTIDE 1.34 MG/ML
INJECTION, SOLUTION SUBCUTANEOUS
COMMUNITY
Start: 2021-09-17 | End: 2022-02-07

## 2021-10-13 ASSESSMENT — FIBROSIS 4 INDEX: FIB4 SCORE: 2.04

## 2021-10-13 ASSESSMENT — ENCOUNTER SYMPTOMS: FEVER: 0

## 2021-10-13 NOTE — PROGRESS NOTES
Subjective:     Pankaj Limon  is a 56 y.o. male who presents for Hand Swelling (x 3 days, Rt.hand pain and swelling)       HPI  Mr. Limon is a very pleasant 56-year-old gentleman who presents to urgent care with right lower arm and hand pain and swelling.  Patient reports weakness of  on the right as well as right arm feeling very shaky when trying to perform activity.  He denies any numbness or tingling.  He reports a tense pressure in the area.  Patient denies any recent injury.  He does have prior history of gout but admits that this does not feel like typical gout and that usually occurs in his foot.  Patient is had no recent fever.  Patient has no prior history of DVT or PE.    Review of Systems   Constitutional: Negative for fever.   Musculoskeletal:        Right wrist and hand pain and swelling   All other systems reviewed and are negative.    No Known Allergies  Past medical history, family history, surgical history and social history are reviewed and updated in the record today.     Objective:   /90 (BP Location: Left arm, Patient Position: Sitting, BP Cuff Size: Large adult)   Pulse (!) 130   Temp 36.6 °C (97.9 °F) (Temporal)   Resp 16   Ht 1.829 m (6')   Wt (!) 132 kg (290 lb)   SpO2 96%   BMI 39.33 kg/m²   Physical Exam  Vitals and nursing note reviewed.   Constitutional:       General: He is not in acute distress.     Appearance: He is well-developed. He is not ill-appearing or toxic-appearing.   HENT:      Head: Normocephalic and atraumatic.      Jaw: There is normal jaw occlusion.      Right Ear: External ear normal.      Left Ear: External ear normal.   Eyes:      General: Lids are normal.      Extraocular Movements: Extraocular movements intact.      Conjunctiva/sclera: Conjunctivae normal.      Pupils: Pupils are equal, round, and reactive to light.   Cardiovascular:      Rate and Rhythm: Normal rate and regular rhythm.      Pulses:           Radial pulses are 2+ on the right  side.   Pulmonary:      Effort: Pulmonary effort is normal.   Musculoskeletal:      Right wrist: Swelling and tenderness present. No deformity, effusion or bony tenderness. Decreased range of motion.      Right hand: Swelling and tenderness present. No bony tenderness. Decreased range of motion.      Cervical back: Normal range of motion and neck supple.      Comments: Right arm from mid forearm to tips of finger with edema along the dorsal aspect of the wrist and hand with tenderness to palpation, no specific bony tenderness.  Patient exhibits limited range of motion with flexion and extension, pronation and supination and radial and ulnar deviation.  No overlying erythema or warmth.  Distal neurovascular intact.   Skin:     General: Skin is warm and dry.      Findings: No rash.   Neurological:      Mental Status: He is alert and oriented to person, place, and time.      Cranial Nerves: No cranial nerve deficit.      Sensory: No sensory deficit.      Motor: Motor function is intact.      Coordination: Coordination is intact.   Psychiatric:         Attention and Perception: Attention normal.         Mood and Affect: Mood and affect normal.         Speech: Speech normal.         Behavior: Behavior normal.         Thought Content: Thought content normal.         Cognition and Memory: Cognition normal.         Judgment: Judgment normal.          Assessment/Plan:   1. Right arm pain  - DX-WRIST-COMPLETE 3+ RIGHT; Future  - US-EXTREMITY VENOUS UPPER UNILAT RIGHT; Future  - CBC WITH DIFFERENTIAL; Future  - D-DIMER; Future  - Sed Rate; Future  - URIC ACID; Future  - REFERRAL TO SPORTS MEDICINE    2. Arm edema  - DX-WRIST-COMPLETE 3+ RIGHT; Future  - US-EXTREMITY VENOUS UPPER UNILAT RIGHT; Future  - CBC WITH DIFFERENTIAL; Future  - D-DIMER; Future  - Sed Rate; Future  - URIC ACID; Future  - REFERRAL TO SPORTS MEDICINE    3. History of gout  - CBC WITH DIFFERENTIAL; Future  - D-DIMER; Future  - Sed Rate; Future  - URIC ACID;  Future    4. Tenosynovitis of right wrist  - REFERRAL TO SPORTS MEDICINE    5. Stage 3 chronic kidney disease, unspecified whether stage 3a or 3b CKD (HCC)    Given atraumatic pain with rather significant edema of the right arm concern for DVT is a possibility.  Check labs and ultrasound.    615: Patient contacted with labs and ultrasound result as follows:      RADIOLOGY RESULTS   DX-WRIST-COMPLETE 3+ RIGHT    Result Date: 10/13/2021  10/13/2021 2:09 PM HISTORY/REASON FOR EXAM:  Atraumatic Pain/Swelling/Deformity TECHNIQUE/EXAM DESCRIPTION AND NUMBER OF VIEWS: 4 views of the RIGHT wrist. COMPARISON:  None FINDINGS: There is no evidence of fracture or dislocation. Alignment is maintained. No osseous erosion is identified. Mild soft tissue swelling is seen about the wrist.     1.  No evidence of acute fracture or dislocation. 2.  Mild soft tissue swelling about the wrist.    US-EXTREMITY VENOUS UPPER UNILAT RIGHT    Result Date: 10/13/2021  10/13/2021 4:20 PM HISTORY/REASON FOR EXAM:  Pain in Limb with Pressure TECHNIQUE/EXAM DESCRIPTION: Real-time sonography of the right upper extremity deep veins was performed with gray-scale graded compression, color and duplex Doppler. COMPARISON:  None. FINDINGS: REAL-TIME GRAY-SCALE IMAGING: Real-time gray-scale imaging reveals no evidence of focal wall thickening. COLOR AND DUPLEX DOPPLER IMAGING: The right internal jugular, subclavian, brachial, radial and ulnar veins are completely compressible and have normal venous pulsatility. There is fluid surrounding a tendon along the right aspect of the wrist..     1.  No evidence of right upper extremity deep venous thrombosis. 2.  Findings of tenosynovitis along the right aspect of the wrist.       Results for YAW KOWALSKI (MRN [id number]) as of 10/13/2021 19:19   Ref. Range 10/13/2021 15:35 10/13/2021 17:04   WBC Latest Ref Range: 4.8 - 10.8 K/uL 8.0    RBC Latest Ref Range: 4.70 - 6.10 M/uL 4.51 (L)    Hemoglobin Latest Ref Range:  14.0 - 18.0 g/dL 14.4    Hematocrit Latest Ref Range: 42.0 - 52.0 % 41.4 (L)    MCV Latest Ref Range: 81.4 - 97.8 fL 91.8    MCH Latest Ref Range: 27.0 - 33.0 pg 31.9    MCHC Latest Ref Range: 33.7 - 35.3 g/dL 34.8    RDW Latest Ref Range: 35.9 - 50.0 fL 46.4    Platelet Count Latest Ref Range: 164 - 446 K/uL 254    MPV Latest Ref Range: 9.0 - 12.9 fL 9.2    Neutrophils-Polys Latest Ref Range: 44.00 - 72.00 % 67.30    Neutrophils (Absolute) Latest Ref Range: 1.82 - 7.42 K/uL 5.37    Lymphocytes Latest Ref Range: 22.00 - 41.00 % 21.70 (L)    Lymphs (Absolute) Latest Ref Range: 1.00 - 4.80 K/uL 1.73    Monocytes Latest Ref Range: 0.00 - 13.40 % 7.40    Monos (Absolute) Latest Ref Range: 0.00 - 0.85 K/uL 0.59    Eosinophils Latest Ref Range: 0.00 - 6.90 % 2.50    Eos (Absolute) Latest Ref Range: 0.00 - 0.51 K/uL 0.20    Basophils Latest Ref Range: 0.00 - 1.80 % 0.80    Baso (Absolute) Latest Ref Range: 0.00 - 0.12 K/uL 0.06    Immature Granulocytes Latest Ref Range: 0.00 - 0.90 % 0.30    Immature Granulocytes (abs) Latest Ref Range: 0.00 - 0.11 K/uL 0.02    Nucleated RBC Latest Units: /100 WBC 0.00    NRBC (Absolute) Latest Units: K/uL 0.00    Sed Rate Westergren Latest Ref Range: 0 - 20 mm/hour 10    Uric Acid Latest Ref Range: 2.5 - 8.3 mg/dL 8.1    D-Dimer Screen Latest Ref Range: 0.00 - 0.50 ug/mL (FEU) 0.58 (H)    US-EXTREMITY VENOUS UPPER UNILAT Unknown  Attch     Given evidence of tenosynovitis without fever, without elevated sedimentation rate and with no elevated WBC I do not believe this is an infectious tenosynovitis and rather likely an overuse type injury.  Patient has left to the office and may  a cock-up wrist splint at his local pharmacy.  However, he can certainly return to our urgent care today for a wrist splint if he feels this is needed.  Otherwise, recommend ice and elevation.  Patient does have chronic kidney disease, most recent renal function labs from 10/1/21 with GFR of 36, BUN of 20  and creatinine of 1.93.  Therefore, recommend avoidance of nonsteroidal anti-inflammatory medications.  If he would like to consider topical Voltaren gel I would like him to reach out to his primary care provider to discuss this prior to starting.  Patient may use Tylenol arthritis strength for pain not to exceed recommended daily dose.  Referral placed to sports medicine for further evaluation and management.    Differential diagnosis, natural history, supportive care, and indications for immediate follow-up discussed.  Red flag warning symptoms and strict ER/follow-up precautions given.  The patient demonstrated a good understanding and agreed with the treatment plan.    Upon entering exam room I ensured patient was wearing a mask.  This provider wore appropriate PPE throughout entire visit.  Patient wore mask entire visit except for a brief period while examining oropharynx.    Please note that this note was created using voice recognition speech to text software. Every effort has been made to correct obvious errors.  However, I expect there are errors of grammar and possibly context that were not discovered prior to finalizing the note  ALMA Allen PA-C

## 2021-11-17 SDOH — ECONOMIC STABILITY: HOUSING INSECURITY: IN THE LAST 12 MONTHS, HOW MANY PLACES HAVE YOU LIVED?: 1

## 2021-11-17 SDOH — HEALTH STABILITY: PHYSICAL HEALTH: ON AVERAGE, HOW MANY DAYS PER WEEK DO YOU ENGAGE IN MODERATE TO STRENUOUS EXERCISE (LIKE A BRISK WALK)?: 3 DAYS

## 2021-11-17 SDOH — ECONOMIC STABILITY: HOUSING INSECURITY
IN THE LAST 12 MONTHS, WAS THERE A TIME WHEN YOU DID NOT HAVE A STEADY PLACE TO SLEEP OR SLEPT IN A SHELTER (INCLUDING NOW)?: NO

## 2021-11-17 SDOH — ECONOMIC STABILITY: TRANSPORTATION INSECURITY
IN THE PAST 12 MONTHS, HAS THE LACK OF TRANSPORTATION KEPT YOU FROM MEDICAL APPOINTMENTS OR FROM GETTING MEDICATIONS?: NO

## 2021-11-17 SDOH — HEALTH STABILITY: MENTAL HEALTH
STRESS IS WHEN SOMEONE FEELS TENSE, NERVOUS, ANXIOUS, OR CAN'T SLEEP AT NIGHT BECAUSE THEIR MIND IS TROUBLED. HOW STRESSED ARE YOU?: ONLY A LITTLE

## 2021-11-17 SDOH — ECONOMIC STABILITY: INCOME INSECURITY: IN THE LAST 12 MONTHS, WAS THERE A TIME WHEN YOU WERE NOT ABLE TO PAY THE MORTGAGE OR RENT ON TIME?: NO

## 2021-11-17 SDOH — HEALTH STABILITY: PHYSICAL HEALTH: ON AVERAGE, HOW MANY MINUTES DO YOU ENGAGE IN EXERCISE AT THIS LEVEL?: 40 MIN

## 2021-11-17 SDOH — ECONOMIC STABILITY: INCOME INSECURITY: HOW HARD IS IT FOR YOU TO PAY FOR THE VERY BASICS LIKE FOOD, HOUSING, MEDICAL CARE, AND HEATING?: NOT HARD AT ALL

## 2021-11-17 SDOH — ECONOMIC STABILITY: FOOD INSECURITY: WITHIN THE PAST 12 MONTHS, YOU WORRIED THAT YOUR FOOD WOULD RUN OUT BEFORE YOU GOT MONEY TO BUY MORE.: NEVER TRUE

## 2021-11-17 SDOH — ECONOMIC STABILITY: FOOD INSECURITY: WITHIN THE PAST 12 MONTHS, THE FOOD YOU BOUGHT JUST DIDN'T LAST AND YOU DIDN'T HAVE MONEY TO GET MORE.: NEVER TRUE

## 2021-11-17 SDOH — ECONOMIC STABILITY: TRANSPORTATION INSECURITY
IN THE PAST 12 MONTHS, HAS LACK OF RELIABLE TRANSPORTATION KEPT YOU FROM MEDICAL APPOINTMENTS, MEETINGS, WORK OR FROM GETTING THINGS NEEDED FOR DAILY LIVING?: NO

## 2021-11-17 SDOH — ECONOMIC STABILITY: TRANSPORTATION INSECURITY
IN THE PAST 12 MONTHS, HAS LACK OF TRANSPORTATION KEPT YOU FROM MEETINGS, WORK, OR FROM GETTING THINGS NEEDED FOR DAILY LIVING?: NO

## 2021-11-17 ASSESSMENT — SOCIAL DETERMINANTS OF HEALTH (SDOH)
HOW MANY DRINKS CONTAINING ALCOHOL DO YOU HAVE ON A TYPICAL DAY WHEN YOU ARE DRINKING: 1 OR 2
IN A TYPICAL WEEK, HOW MANY TIMES DO YOU TALK ON THE PHONE WITH FAMILY, FRIENDS, OR NEIGHBORS?: TWICE A WEEK
WITHIN THE PAST 12 MONTHS, YOU WORRIED THAT YOUR FOOD WOULD RUN OUT BEFORE YOU GOT THE MONEY TO BUY MORE: NEVER TRUE
HOW OFTEN DO YOU ATTEND CHURCH OR RELIGIOUS SERVICES?: NEVER
HOW OFTEN DO YOU ATTENT MEETINGS OF THE CLUB OR ORGANIZATION YOU BELONG TO?: NEVER
HOW OFTEN DO YOU ATTEND CHURCH OR RELIGIOUS SERVICES?: NEVER
HOW OFTEN DO YOU GET TOGETHER WITH FRIENDS OR RELATIVES?: ONCE A WEEK
HOW OFTEN DO YOU ATTENT MEETINGS OF THE CLUB OR ORGANIZATION YOU BELONG TO?: NEVER
IN A TYPICAL WEEK, HOW MANY TIMES DO YOU TALK ON THE PHONE WITH FAMILY, FRIENDS, OR NEIGHBORS?: TWICE A WEEK
HOW OFTEN DO YOU GET TOGETHER WITH FRIENDS OR RELATIVES?: ONCE A WEEK
HOW OFTEN DO YOU HAVE SIX OR MORE DRINKS ON ONE OCCASION: LESS THAN MONTHLY
DO YOU BELONG TO ANY CLUBS OR ORGANIZATIONS SUCH AS CHURCH GROUPS UNIONS, FRATERNAL OR ATHLETIC GROUPS, OR SCHOOL GROUPS?: NO
DO YOU BELONG TO ANY CLUBS OR ORGANIZATIONS SUCH AS CHURCH GROUPS UNIONS, FRATERNAL OR ATHLETIC GROUPS, OR SCHOOL GROUPS?: NO
HOW HARD IS IT FOR YOU TO PAY FOR THE VERY BASICS LIKE FOOD, HOUSING, MEDICAL CARE, AND HEATING?: NOT HARD AT ALL
HOW OFTEN DO YOU HAVE A DRINK CONTAINING ALCOHOL: 2-4 TIMES A MONTH

## 2021-11-17 ASSESSMENT — LIFESTYLE VARIABLES
HOW OFTEN DO YOU HAVE A DRINK CONTAINING ALCOHOL: 2-4 TIMES A MONTH
HOW MANY STANDARD DRINKS CONTAINING ALCOHOL DO YOU HAVE ON A TYPICAL DAY: 1 OR 2
HOW OFTEN DO YOU HAVE SIX OR MORE DRINKS ON ONE OCCASION: LESS THAN MONTHLY

## 2021-11-18 ENCOUNTER — OFFICE VISIT (OUTPATIENT)
Dept: SPORTS MEDICINE | Facility: CLINIC | Age: 56
End: 2021-11-18
Payer: COMMERCIAL

## 2021-11-18 VITALS
SYSTOLIC BLOOD PRESSURE: 136 MMHG | BODY MASS INDEX: 39.28 KG/M2 | HEART RATE: 104 BPM | RESPIRATION RATE: 18 BRPM | OXYGEN SATURATION: 96 % | DIASTOLIC BLOOD PRESSURE: 92 MMHG | TEMPERATURE: 98.8 F | WEIGHT: 290 LBS | HEIGHT: 72 IN

## 2021-11-18 DIAGNOSIS — M67.431 GANGLION CYST OF WRIST, RIGHT: ICD-10-CM

## 2021-11-18 PROCEDURE — 99213 OFFICE O/P EST LOW 20 MIN: CPT | Performed by: FAMILY MEDICINE

## 2021-11-18 ASSESSMENT — FIBROSIS 4 INDEX: FIB4 SCORE: 1.61

## 2021-11-18 NOTE — PATIENT INSTRUCTIONS
ARNICARE Gel, available at all major pharmacies (usually by the icy hot)        Or Volatarn GEL

## 2021-11-18 NOTE — PROGRESS NOTES
CHIEF COMPLAINT:  Pankaj Limon male presenting at the request of Gisselle Cook P.A.-C.  for evaluation of Right Wrist/Hand Pain (Right-Handed Dominant)    RIGHT hand and wrist  Insidious onset  difficulty gripping  He has noticed swelling at the dorsal aspect of the wrist/hand  Which has been there since early 2021  Pretty much staying the same size  Intermittently painful, particularly with golfing when he makes a divot  He was having some difficulty with finger extension on the RIGHT hand which has resolved  Difficulty also with gripping and lifting has improved  Still feels some pressure when he makes a fist with the RIGHT hand, predominantly along the metacarpal/extensor tendon region  Improved with rest as well as icing  Seen at urgent care  Initially, he was having night symptoms which have resolved  Tylenol for pain helped    Accounting  Likes golfing, pickleball, cycling and walking    REVIEW OF SYSTEMS  No Nausea, No Vomiting, No Chest Pain, No Shortness of Breath, No Dizziness, No Headache    PAST MEDICAL HISTORY:   History reviewed. No pertinent past medical history.    PMH:  has a past medical history of Chickenpox, Diabetes (HCC), ED (erectile dysfunction), HTN (hypertension), Obesity, YOVANA on CPAP, and Sleep apnea.  MEDS:   Current Outpatient Medications:   •  OZEMPIC, 1 MG/DOSE, 4 MG/3ML Solution Pen-injector, INJECT 1MG SUBCUTANEOUSLY ONCE WEEKLY, Disp: , Rfl:   •  sildenafil citrate (VIAGRA) 100 MG tablet, TAKE ONE TABLET BY MOUTH DAILY AS NEEDED FOR ERECTILE DYSFUNCTION, Disp: 90 Tablet, Rfl: 0  •  lisinopril (PRINIVIL) 10 MG Tab, Take 1 tablet by mouth once daily, Disp: 90 tablet, Rfl: 1  •  atorvastatin (LIPITOR) 20 MG Tab, TAKE 1 TABLET BY MOUTH ONCE DAILY AT BEDTIME, Disp: 90 tablet, Rfl: 1  •  Semaglutide, 1 MG/DOSE, (OZEMPIC, 1 MG/DOSE,) 2 MG/1.5ML Solution Pen-injector, Inject 1 mg under the skin every 7 days., Disp: 1.5 mL, Rfl: 5  •  aspirin EC (ECOTRIN) 81 MG Tablet Delayed Response,  Take 81 mg by mouth every morning., Disp: , Rfl:   •  colchicine (COLCRYS) 0.6 MG Tab, TAKE 2 TABLETS BY MOUTH AT FIRST ONSET OF GOUT AND TAKE ANOTHER TABLET AFTER 1 HOUR, Disp: 30 Tab, Rfl: 1  •  Blood Glucose Monitoring Suppl Device, Meter: Dispense Device of Insurance Preference. Sig. Use as directed for blood sugar monitoring. #1. NR., Disp: 1 Device, Rfl: 0  •  Blood Glucose Test Strips, Test strips order: Test strips for glucometer. Sig: use once daily before breakfast., Disp: 50 Strip, Rfl: 5  •  Blood Glucose Test Strips, Test strips order: Test strips for glucometer. Sig: use  Once daily before breakfast., Disp: 50 Strip, Rfl: 5  •  Cholecalciferol (VITAMIN D) 2000 UNIT Tab, Take 6,000 Units by mouth every morning. 3 tablets = 6000 units, Disp: , Rfl:   ALLERGIES: No Known Allergies  SURGHX:   Past Surgical History:   Procedure Laterality Date   • COLONOSCOPY WITH POLYP  03/28/2019    Polyp ascending colon, polyp transverse colon-3 adenomatous polyps, mild diverticulosis sigmoid colon   • COLONOSCOPY WITH POLYP  2013    polyp - adenomatous- [facility]   • ARTHROSCOPY, KNEE      left knee    • OTHER      repair of perforated nasal septum   • SINUSCOPE       SOCHX:  reports that he has never smoked. He has never used smokeless tobacco. He reports current alcohol use. He reports that he does not use drugs.  FH: Family history was reviewed, no pertinent findings to report     PHYSICAL EXAM:  /92 (BP Location: Left arm, Patient Position: Sitting, BP Cuff Size: Large adult)   Pulse (!) 104   Temp 37.1 °C (98.8 °F) (Temporal)   Resp 18   Ht 1.829 m (6')   Wt (!) 132 kg (290 lb)   SpO2 96%   BMI 39.33 kg/m²      NAD  Normal gait    Hand exam    NAD  Alert and oriented    BILATERAL ELBOW exam  Range of motion intact  No swelling  No tenderness the medial or lateral epicondyle  Shirley test NEGATIVE    BILATERAL WRIST exam  Range of motion intact  POSITIVE swelling at the dorsal radial aspect of the RIGHT  wrist  With MILD tenderness at firm pea-sized cystic mass  No tenderness along scaphoid, TFCC insertion, distal radius or distal ulna  Tinel's testing is NEGATIVE  The hand is otherwise neurovascularly intact    1. Ganglion cyst of wrist, right       Insidious onset  difficulty gripping  He has noticed swelling at the dorsal aspect of the wrist/hand  Which has been there since early 2021  Worse with hitting divots during golf    We discussed self massage and topical Arnica or Voltaren gel    Provided information on ganglion cyst    If symptoms persist he can contact us to schedule a ultrasound-guided aspiration/injection of his dorsal ganglion cyst        10/13/2021 2:09 PM     HISTORY/REASON FOR EXAM:  Atraumatic Pain/Swelling/Deformity        TECHNIQUE/EXAM DESCRIPTION AND NUMBER OF VIEWS:  4 views of the RIGHT wrist.     COMPARISON:  None     FINDINGS:  There is no evidence of fracture or dislocation. Alignment is maintained. No osseous erosion is identified. Mild soft tissue swelling is seen about the wrist.        IMPRESSION:     1.  No evidence of acute fracture or dislocation.  2.  Mild soft tissue swelling about the wrist.           Exam Ended: 10/13/21  2:09 PM Last Resulted: 10/13/21  2:22 PM           done elsewhere and reviewed independently by me    Thank you Gisselle Cook P.A.-C. for allowing me to participate in caring for your patient.

## 2021-11-24 ENCOUNTER — OFFICE VISIT (OUTPATIENT)
Dept: MEDICAL GROUP | Facility: PHYSICIAN GROUP | Age: 56
End: 2021-11-24
Payer: COMMERCIAL

## 2021-11-24 VITALS
TEMPERATURE: 98.4 F | WEIGHT: 281.75 LBS | BODY MASS INDEX: 38.16 KG/M2 | HEART RATE: 104 BPM | DIASTOLIC BLOOD PRESSURE: 80 MMHG | HEIGHT: 72 IN | OXYGEN SATURATION: 94 % | SYSTOLIC BLOOD PRESSURE: 138 MMHG

## 2021-11-24 DIAGNOSIS — I10 ESSENTIAL HYPERTENSION: ICD-10-CM

## 2021-11-24 DIAGNOSIS — N18.32 STAGE 3B CHRONIC KIDNEY DISEASE: ICD-10-CM

## 2021-11-24 DIAGNOSIS — R80.9 MICROALBUMINURIA: ICD-10-CM

## 2021-11-24 DIAGNOSIS — Z12.5 SCREENING FOR PROSTATE CANCER: ICD-10-CM

## 2021-11-24 DIAGNOSIS — M79.671 FOOT PAIN, BILATERAL: ICD-10-CM

## 2021-11-24 DIAGNOSIS — E78.5 DYSLIPIDEMIA: ICD-10-CM

## 2021-11-24 DIAGNOSIS — M79.672 FOOT PAIN, BILATERAL: ICD-10-CM

## 2021-11-24 DIAGNOSIS — E55.9 VITAMIN D DEFICIENCY: ICD-10-CM

## 2021-11-24 DIAGNOSIS — Z23 NEED FOR SHINGLES VACCINE: ICD-10-CM

## 2021-11-24 PROCEDURE — 90750 HZV VACC RECOMBINANT IM: CPT | Performed by: FAMILY MEDICINE

## 2021-11-24 PROCEDURE — 90471 IMMUNIZATION ADMIN: CPT | Performed by: FAMILY MEDICINE

## 2021-11-24 PROCEDURE — 99214 OFFICE O/P EST MOD 30 MIN: CPT | Mod: 25 | Performed by: FAMILY MEDICINE

## 2021-11-24 RX ORDER — EMPAGLIFLOZIN 10 MG/1
1 TABLET, FILM COATED ORAL DAILY
Qty: 90 TABLET | Refills: 1 | Status: SHIPPED | OUTPATIENT
Start: 2021-11-24 | End: 2022-03-07 | Stop reason: SDUPTHER

## 2021-11-24 ASSESSMENT — FIBROSIS 4 INDEX: FIB4 SCORE: 1.61

## 2021-11-24 NOTE — PROGRESS NOTES
Subjective     Bijan Limon is a 56 y.o. male who presents with Diabetes            HPI     Patient returns for follow-up of his medical problems.    For his diabetes mellitus type 2, he is now on maximum dose of Ozempic 1 mg weekly.  We did not put him on Metformin because of his chronic kidney disease with the lowest GFR recorded at 31.  He is tolerating the Ozempic without side effects.  He has lost 9 pounds since the last visit.  Blood work was done before this visit.    In terms of his microalbuminuria and stage IIIb chronic kidney disease, he is on lisinopril 10 mg daily.  He follows up with Dr. Choe for his chronic kidney disease.  His next appointment is next week.    For his hypertension, he is on lisinopril and blood pressure is under acceptable control.    He continues to take atorvastatin for dyslipidemia without myalgias.    He takes vitamin D supplementation for vitamin D deficiency.    I have referred him to the podiatrist for bilateral foot pain.  The pain is described as a vice-like pain or squeezing pain across the forefoot.  Denies any numbness, tingling, burning sensation.  He was seen last year and he is asking for referral for follow-up.    He needs shingles vaccine today.    Past medical history, past surgical history, family history reviewed-no changes    Social history reviewed-no changes    Allergies reviewed-no changes    Medications reviewed-no changes          ROS     As per HPI, the rest are negative.           Objective     /80 (BP Location: Left arm, Patient Position: Sitting, BP Cuff Size: Adult)   Pulse (!) 104   Temp 36.9 °C (98.4 °F) (Temporal)   Ht 1.829 m (6')   Wt (!) 128 kg (281 lb 12 oz)   SpO2 94%   BMI 38.21 kg/m²      Physical Exam     Examined alert, awake, oriented, not in distress    Neck-supple, no lymphadenopathy, no thyromegaly  Lungs-clear to auscultation, no rales, no wheezes  Heart-regular rate and rhythm, no murmur  Extremities-no edema, clubbing,  cyanosis             Hospital Outpatient Visit on 10/13/2021   Component Date Value Ref Range Status   • Uric Acid 10/13/2021 8.1  2.5 - 8.3 mg/dL Final   • Sed Rate Westergren 10/13/2021 10  0 - 20 mm/hour Final   • D-Dimer Screen 10/13/2021 0.58* 0.00 - 0.50 ug/mL (FEU) Final    Comment: A negative D-Dimer result when combined with a clinical  assessment of low probability has been shown to have a high  negative predictive value for DVT or PE.    This assay should not be used independently to exclude or  diagnose DVT or Pulmonary Embolism.    This test methodology does not differentiate between DVT and  PE when an elevation in results is demonstrated.     • WBC 10/13/2021 8.0  4.8 - 10.8 K/uL Final   • RBC 10/13/2021 4.51* 4.70 - 6.10 M/uL Final   • Hemoglobin 10/13/2021 14.4  14.0 - 18.0 g/dL Final   • Hematocrit 10/13/2021 41.4* 42.0 - 52.0 % Final   • MCV 10/13/2021 91.8  81.4 - 97.8 fL Final   • MCH 10/13/2021 31.9  27.0 - 33.0 pg Final   • MCHC 10/13/2021 34.8  33.7 - 35.3 g/dL Final   • RDW 10/13/2021 46.4  35.9 - 50.0 fL Final   • Platelet Count 10/13/2021 254  164 - 446 K/uL Final   • MPV 10/13/2021 9.2  9.0 - 12.9 fL Final   • Neutrophils-Polys 10/13/2021 67.30  44.00 - 72.00 % Final   • Lymphocytes 10/13/2021 21.70* 22.00 - 41.00 % Final   • Monocytes 10/13/2021 7.40  0.00 - 13.40 % Final   • Eosinophils 10/13/2021 2.50  0.00 - 6.90 % Final   • Basophils 10/13/2021 0.80  0.00 - 1.80 % Final   • Immature Granulocytes 10/13/2021 0.30  0.00 - 0.90 % Final   • Nucleated RBC 10/13/2021 0.00  /100 WBC Final   • Neutrophils (Absolute) 10/13/2021 5.37  1.82 - 7.42 K/uL Final    Includes immature neutrophils, if present.   • Lymphs (Absolute) 10/13/2021 1.73  1.00 - 4.80 K/uL Final   • Monos (Absolute) 10/13/2021 0.59  0.00 - 0.85 K/uL Final   • Eos (Absolute) 10/13/2021 0.20  0.00 - 0.51 K/uL Final   • Baso (Absolute) 10/13/2021 0.06  0.00 - 0.12 K/uL Final   • Immature Granulocytes (abs) 10/13/2021 0.02  0.00 -  0.11 K/uL Final   • NRBC (Absolute) 10/13/2021 0.00  K/uL Final                  Assessment & Plan        1. Uncontrolled type 2 diabetes mellitus with microalbuminuria, without long-term current use of insulin (HCC)  This is improved with hemoglobin A1c down from 9.3-8.6.  We still need to get the hemoglobin A1c down below 7.0.  He is on maximum dose of Ozempic.  We cannot give Metformin because of chronic kidney disease.  We will add Jardiance 10 mg 1 tablet daily.  We will have to monitor kidney function closely while on Jardiance.  If GFR drops below 30 then we will discontinue the medication.  Discussed potential side effects.  We will do updated blood work and follow-up in 3 months.  - Empagliflozin (JARDIANCE) 10 MG Tab; Take 1 Tablet by mouth every day.  Dispense: 90 Tablet; Refill: 1  - Lipid Profile; Future  - Comp Metabolic Panel; Future  - HEMOGLOBIN A1C; Future  - PROSTATE SPECIFIC AG SCREENING; Future  - VITAMIN D,25 HYDROXY; Future    2. Microalbuminuria  Patient is already on lisinopril.  - Lipid Profile; Future  - Comp Metabolic Panel; Future  - HEMOGLOBIN A1C; Future  - PROSTATE SPECIFIC AG SCREENING; Future  - VITAMIN D,25 HYDROXY; Future    3. Stage 3b chronic kidney disease (HCC)  His kidney function has worsened with GFR down from 45-36 consistent with stage IIIb chronic kidney disease.  He has been fluctuating between stage IIIa to stage IIIb CKD.  Keep appointment with nephrologist.  Advised proper hydration.  Avoid NSAIDs.  - Lipid Profile; Future  - Comp Metabolic Panel; Future  - HEMOGLOBIN A1C; Future  - PROSTATE SPECIFIC AG SCREENING; Future  - VITAMIN D,25 HYDROXY; Future    4. Essential hypertension  Under acceptable control on lisinopril.  - Lipid Profile; Future  - Comp Metabolic Panel; Future  - HEMOGLOBIN A1C; Future  - PROSTATE SPECIFIC AG SCREENING; Future  - VITAMIN D,25 HYDROXY; Future    5. Dyslipidemia  Triglycerides down to goal.  HDL is still low and lower than before but  advised increase activity with regular exercises.  LDL is at target.  Continue atorvastatin.  - Lipid Profile; Future  - Comp Metabolic Panel; Future  - HEMOGLOBIN A1C; Future  - PROSTATE SPECIFIC AG SCREENING; Future  - VITAMIN D,25 HYDROXY; Future    6. Vitamin D deficiency  The last vitamin D level was normal at 43.  Continue vitamin D supplementation.  Recheck vitamin D level next visit.  - Lipid Profile; Future  - Comp Metabolic Panel; Future  - HEMOGLOBIN A1C; Future  - PROSTATE SPECIFIC AG SCREENING; Future  - VITAMIN D,25 HYDROXY; Future    7. Foot pain, bilateral  Updated referral done so he can go back and see the podiatrist for bilateral foot pain.    - Referral to Podiatry    8. Screening for prostate cancer  Screening PSA ordered for the next blood work.  - Lipid Profile; Future  - Comp Metabolic Panel; Future  - HEMOGLOBIN A1C; Future  - PROSTATE SPECIFIC AG SCREENING; Future  - VITAMIN D,25 HYDROXY; Future    9. Need for shingles vaccine  Shingles vaccine given.  Discussed potential side effects and how he can manage them.  We will do second dose next visit.  - Shingles Vaccine (Shingrix)    Follow-up in 3 months.        Please note that this dictation was created using voice recognition software. I have worked with consultants from the vendor as well as technical experts from [facility]  [employer]k [employer] to optimize the interface. I have made every reasonable attempt to correct obvious errors, but I expect that there are errors of grammar and possibly content I did not discover before finalizing the note.

## 2021-11-30 ENCOUNTER — OFFICE VISIT (OUTPATIENT)
Dept: NEPHROLOGY | Facility: MEDICAL CENTER | Age: 56
End: 2021-11-30
Payer: COMMERCIAL

## 2021-11-30 VITALS
DIASTOLIC BLOOD PRESSURE: 72 MMHG | BODY MASS INDEX: 39.82 KG/M2 | TEMPERATURE: 99 F | OXYGEN SATURATION: 96 % | HEART RATE: 111 BPM | WEIGHT: 294 LBS | HEIGHT: 72 IN | SYSTOLIC BLOOD PRESSURE: 118 MMHG

## 2021-11-30 DIAGNOSIS — G47.33 OSA ON CPAP: ICD-10-CM

## 2021-11-30 DIAGNOSIS — I10 ESSENTIAL HYPERTENSION: ICD-10-CM

## 2021-11-30 DIAGNOSIS — N18.30 STAGE 3 CHRONIC KIDNEY DISEASE, UNSPECIFIED WHETHER STAGE 3A OR 3B CKD: ICD-10-CM

## 2021-11-30 DIAGNOSIS — E11.22 TYPE 2 DIABETES MELLITUS WITH STAGE 3 CHRONIC KIDNEY DISEASE, UNSPECIFIED WHETHER LONG TERM INSULIN USE, UNSPECIFIED WHETHER STAGE 3A OR 3B CKD (HCC): ICD-10-CM

## 2021-11-30 DIAGNOSIS — N18.30 TYPE 2 DIABETES MELLITUS WITH STAGE 3 CHRONIC KIDNEY DISEASE, UNSPECIFIED WHETHER LONG TERM INSULIN USE, UNSPECIFIED WHETHER STAGE 3A OR 3B CKD (HCC): ICD-10-CM

## 2021-11-30 PROCEDURE — 99214 OFFICE O/P EST MOD 30 MIN: CPT | Performed by: INTERNAL MEDICINE

## 2021-11-30 ASSESSMENT — ENCOUNTER SYMPTOMS
COUGH: 0
FEVER: 0
CHILLS: 0
NAUSEA: 0
VOMITING: 0
SHORTNESS OF BREATH: 0
HYPERTENSION: 1

## 2021-11-30 ASSESSMENT — FIBROSIS 4 INDEX: FIB4 SCORE: 1.61

## 2021-12-01 NOTE — PROGRESS NOTES
Subjective     Bijan Limon is a 56 y.o. male who presents with Hypertension and Chronic Kidney Disease            Hypertension  This is a chronic problem. The current episode started more than 1 year ago. The problem is unchanged. The problem is controlled. Pertinent negatives include no chest pain, malaise/fatigue, peripheral edema or shortness of breath. Risk factors for coronary artery disease include male gender. Past treatments include ACE inhibitors. The current treatment provides significant improvement. There are no compliance problems.  Hypertensive end-organ damage includes kidney disease. Identifiable causes of hypertension include chronic renal disease.   Chronic Kidney Disease  This is a chronic problem. The current episode started more than 1 year ago. The problem occurs constantly. The problem has been unchanged. Pertinent negatives include no chest pain, chills, coughing, fever, nausea, urinary symptoms or vomiting.       Review of Systems   Constitutional: Negative for chills, fever and malaise/fatigue.   Respiratory: Negative for cough and shortness of breath.    Cardiovascular: Negative for chest pain and leg swelling.   Gastrointestinal: Negative for nausea and vomiting.   Genitourinary: Negative for dysuria, frequency and urgency.              Objective     /72 (BP Location: Right arm, Patient Position: Sitting, BP Cuff Size: Large adult)   Pulse (!) 111   Temp 37.2 °C (99 °F) (Temporal)   Ht 1.829 m (6')   Wt (!) 133 kg (294 lb)   SpO2 96%   BMI 39.87 kg/m²      Physical Exam  Vitals and nursing note reviewed.   Constitutional:       General: He is not in acute distress.     Appearance: He is not ill-appearing.   HENT:      Head: Normocephalic and atraumatic.      Right Ear: External ear normal.      Left Ear: External ear normal.      Nose: Nose normal.   Eyes:      General:         Right eye: No discharge.         Left eye: No discharge.      Conjunctiva/sclera: Conjunctivae  normal.   Cardiovascular:      Rate and Rhythm: Normal rate and regular rhythm.      Heart sounds: No murmur heard.      Pulmonary:      Effort: Pulmonary effort is normal. No respiratory distress.      Breath sounds: Normal breath sounds. No wheezing.   Musculoskeletal:         General: No tenderness or deformity.      Right lower leg: No edema.      Left lower leg: No edema.   Skin:     General: Skin is warm.   Neurological:      General: No focal deficit present.      Mental Status: He is alert and oriented to person, place, and time.   Psychiatric:         Mood and Affect: Mood normal.         Behavior: Behavior normal.       Past Medical History:   Diagnosis Date   • Chickenpox    • Diabetes (HCC)    • ED (erectile dysfunction)    • HTN (hypertension)    • Obesity    • YOVANA on CPAP    • Sleep apnea      Social History     Socioeconomic History   • Marital status:      Spouse name: Not on file   • Number of children: 0   • Years of education: Not on file   • Highest education level: Bachelor's degree (e.g., BA, AB, BS)   Occupational History   • Occupation: controller - [employer]   Tobacco Use   • Smoking status: Never Smoker   • Smokeless tobacco: Never Used   Vaping Use   • Vaping Use: Never used   Substance and Sexual Activity   • Alcohol use: Yes     Alcohol/week: 0.0 oz     Comment: rare    • Drug use: No   • Sexual activity: Yes     Partners: Female   Other Topics Concern   • Not on file   Social History Narrative   • Not on file     Social Determinants of Health     Financial Resource Strain: Low Risk    • Difficulty of Paying Living Expenses: Not hard at all   Food Insecurity: No Food Insecurity   • Worried About Running Out of Food in the Last Year: Never true   • Ran Out of Food in the Last Year: Never true   Transportation Needs: No Transportation Needs   • Lack of Transportation (Medical): No   • Lack of Transportation (Non-Medical): No   Physical Activity: Insufficiently Active   • Days  of Exercise per Week: 3 days   • Minutes of Exercise per Session: 40 min   Stress: No Stress Concern Present   • Feeling of Stress : Only a little   Social Connections: Moderately Isolated   • Frequency of Communication with Friends and Family: Twice a week   • Frequency of Social Gatherings with Friends and Family: Once a week   • Attends Yazidi Services: Never   • Active Member of Clubs or Organizations: No   • Attends Club or Organization Meetings: Never   • Marital Status:    Intimate Partner Violence:    • Fear of Current or Ex-Partner: Not on file   • Emotionally Abused: Not on file   • Physically Abused: Not on file   • Sexually Abused: Not on file   Housing Stability: Low Risk    • Unable to Pay for Housing in the Last Year: No   • Number of Places Lived in the Last Year: 1   • Unstable Housing in the Last Year: No     Family History   Problem Relation Age of Onset   • Diabetes Father    • Hyperlipidemia Father    • Stroke Father    • Sleep Apnea Neg Hx      Recent Labs     12/23/20  0742 10/01/21  0810   ALBUMIN 3.6 3.8   HDL 39* 33*   TRIGLYCERIDE 176* 116   SODIUM 133* 135   POTASSIUM 4.5 4.3   CHLORIDE 101 102   CO2 24 25   BUN 20 20   CREATININE 1.60* 1.93*                             Assessment & Plan        1. Stage 3 chronic kidney disease, unspecified whether stage 3a or 3b CKD (HCC)  Stable  No uremic symptoms  Renal dose of medication  Avoid nephrotoxins  Continue same medication regimen      2. Essential hypertension  Controlled  Continue same medication regimen  Continue low-sodium diet    3. YOVANA on CPAP      4. Type 2 diabetes mellitus with stage 3 chronic kidney disease, unspecified whether long term insulin use, unspecified whether stage 3a or 3b CKD (HCC)  Uncontrolled  Continue to optimize diabetes control for hemoglobin A1c below 7%

## 2022-02-07 RX ORDER — SEMAGLUTIDE 1.34 MG/ML
INJECTION, SOLUTION SUBCUTANEOUS
Qty: 18 ML | Refills: 1 | Status: SHIPPED | OUTPATIENT
Start: 2022-02-07 | End: 2023-03-15 | Stop reason: SDUPTHER

## 2022-03-01 ENCOUNTER — HOSPITAL ENCOUNTER (OUTPATIENT)
Dept: LAB | Facility: MEDICAL CENTER | Age: 57
End: 2022-03-01
Attending: FAMILY MEDICINE
Payer: COMMERCIAL

## 2022-03-01 DIAGNOSIS — N18.32 STAGE 3B CHRONIC KIDNEY DISEASE: ICD-10-CM

## 2022-03-01 DIAGNOSIS — R80.9 MICROALBUMINURIA: ICD-10-CM

## 2022-03-01 DIAGNOSIS — Z12.5 SCREENING FOR PROSTATE CANCER: ICD-10-CM

## 2022-03-01 DIAGNOSIS — E78.5 DYSLIPIDEMIA: ICD-10-CM

## 2022-03-01 DIAGNOSIS — E55.9 VITAMIN D DEFICIENCY: ICD-10-CM

## 2022-03-01 DIAGNOSIS — I10 ESSENTIAL HYPERTENSION: ICD-10-CM

## 2022-03-01 LAB
25(OH)D3 SERPL-MCNC: 48 NG/ML (ref 30–100)
ALBUMIN SERPL BCP-MCNC: 4.2 G/DL (ref 3.2–4.9)
ALBUMIN/GLOB SERPL: 1.8 G/DL
ALP SERPL-CCNC: 73 U/L (ref 30–99)
ALT SERPL-CCNC: 14 U/L (ref 2–50)
ANION GAP SERPL CALC-SCNC: 11 MMOL/L (ref 7–16)
AST SERPL-CCNC: 15 U/L (ref 12–45)
BILIRUB SERPL-MCNC: 0.6 MG/DL (ref 0.1–1.5)
BUN SERPL-MCNC: 32 MG/DL (ref 8–22)
CALCIUM SERPL-MCNC: 9.1 MG/DL (ref 8.5–10.5)
CHLORIDE SERPL-SCNC: 103 MMOL/L (ref 96–112)
CHOLEST SERPL-MCNC: 133 MG/DL (ref 100–199)
CO2 SERPL-SCNC: 23 MMOL/L (ref 20–33)
CREAT SERPL-MCNC: 1.89 MG/DL (ref 0.5–1.4)
EST. AVERAGE GLUCOSE BLD GHB EST-MCNC: 217 MG/DL
FASTING STATUS PATIENT QL REPORTED: NORMAL
GLOBULIN SER CALC-MCNC: 2.3 G/DL (ref 1.9–3.5)
GLUCOSE SERPL-MCNC: 225 MG/DL (ref 65–99)
HBA1C MFR BLD: 9.2 % (ref 4–5.6)
HDLC SERPL-MCNC: 40 MG/DL
LDLC SERPL CALC-MCNC: 63 MG/DL
POTASSIUM SERPL-SCNC: 4.5 MMOL/L (ref 3.6–5.5)
PROT SERPL-MCNC: 6.5 G/DL (ref 6–8.2)
PSA SERPL-MCNC: 1.11 NG/ML (ref 0–4)
SODIUM SERPL-SCNC: 137 MMOL/L (ref 135–145)
TRIGL SERPL-MCNC: 152 MG/DL (ref 0–149)

## 2022-03-01 PROCEDURE — 84153 ASSAY OF PSA TOTAL: CPT

## 2022-03-01 PROCEDURE — 83036 HEMOGLOBIN GLYCOSYLATED A1C: CPT

## 2022-03-01 PROCEDURE — 80053 COMPREHEN METABOLIC PANEL: CPT

## 2022-03-01 PROCEDURE — 80061 LIPID PANEL: CPT

## 2022-03-01 PROCEDURE — 36415 COLL VENOUS BLD VENIPUNCTURE: CPT

## 2022-03-01 PROCEDURE — 82306 VITAMIN D 25 HYDROXY: CPT

## 2022-03-03 ENCOUNTER — OFFICE VISIT (OUTPATIENT)
Dept: MEDICAL GROUP | Facility: PHYSICIAN GROUP | Age: 57
End: 2022-03-03
Payer: COMMERCIAL

## 2022-03-03 VITALS
TEMPERATURE: 97.7 F | SYSTOLIC BLOOD PRESSURE: 110 MMHG | HEART RATE: 99 BPM | WEIGHT: 279.98 LBS | BODY MASS INDEX: 37.92 KG/M2 | HEIGHT: 72 IN | DIASTOLIC BLOOD PRESSURE: 60 MMHG | OXYGEN SATURATION: 95 %

## 2022-03-03 DIAGNOSIS — E78.5 DYSLIPIDEMIA: ICD-10-CM

## 2022-03-03 DIAGNOSIS — R80.9 MICROALBUMINURIA: ICD-10-CM

## 2022-03-03 DIAGNOSIS — E55.9 VITAMIN D DEFICIENCY: ICD-10-CM

## 2022-03-03 DIAGNOSIS — Z23 NEED FOR SHINGLES VACCINE: ICD-10-CM

## 2022-03-03 DIAGNOSIS — I10 ESSENTIAL HYPERTENSION: ICD-10-CM

## 2022-03-03 DIAGNOSIS — N18.32 STAGE 3B CHRONIC KIDNEY DISEASE: ICD-10-CM

## 2022-03-03 PROCEDURE — 99214 OFFICE O/P EST MOD 30 MIN: CPT | Performed by: FAMILY MEDICINE

## 2022-03-03 RX ORDER — ZOSTER VACCINE RECOMBINANT, ADJUVANTED 50 MCG/0.5
0.5 KIT INTRAMUSCULAR ONCE
Qty: 1 EACH | Refills: 1 | Status: SHIPPED | OUTPATIENT
Start: 2022-03-03 | End: 2022-03-03 | Stop reason: SDUPTHER

## 2022-03-03 RX ORDER — ZOSTER VACCINE RECOMBINANT, ADJUVANTED 50 MCG/0.5
0.5 KIT INTRAMUSCULAR ONCE
Qty: 1 EACH | Refills: 1 | Status: SHIPPED | OUTPATIENT
Start: 2022-03-03 | End: 2022-03-03

## 2022-03-03 RX ORDER — ATORVASTATIN CALCIUM 20 MG/1
20 TABLET, FILM COATED ORAL
Qty: 90 TABLET | Refills: 1 | Status: SHIPPED | OUTPATIENT
Start: 2022-03-03 | End: 2022-11-17 | Stop reason: SDUPTHER

## 2022-03-03 RX ORDER — LISINOPRIL 10 MG/1
10 TABLET ORAL DAILY
Qty: 90 TABLET | Refills: 1 | Status: SHIPPED | OUTPATIENT
Start: 2022-03-03 | End: 2023-02-15 | Stop reason: SDUPTHER

## 2022-03-03 ASSESSMENT — PATIENT HEALTH QUESTIONNAIRE - PHQ9
CLINICAL INTERPRETATION OF PHQ2 SCORE: 2
SUM OF ALL RESPONSES TO PHQ QUESTIONS 1-9: 5
5. POOR APPETITE OR OVEREATING: 0 - NOT AT ALL

## 2022-03-03 ASSESSMENT — FIBROSIS 4 INDEX: FIB4 SCORE: 0.92

## 2022-03-03 NOTE — PATIENT INSTRUCTIONS

## 2022-03-03 NOTE — PROGRESS NOTES
Subjective     Bijan Limon is a 56 y.o. male who presents with Diabetes            HPI     Patient returns for follow-up of his medical problems.    For his diabetes mellitus type 2 with microalbuminuria and CKD stage IIIb, I added Jardiance 10 mg 1 tablet daily 3 months ago to his Ozempic to get the diabetes better controlled.  He is already on maximum dose of Ozempic.  He lost 3 more pounds since last visit.  He admits he has not been careful in his diet because of busy work schedule.    He continues to follow-up with Dr. Choe, nephrologist for chronic kidney disease stage IIIb and microalbuminuria.  He is already on lisinopril 10 mg daily.    Blood pressure is under control on lisinopril.    He continues to take atorvastatin for dyslipidemia without myalgias.    He continues to take vitamin D supplementation for vitamin D deficiency.    He is due for second dose of shingles but we are not anymore giving it here in the office so he will get it from the pharmacy.    Past medical history, past surgical history, family history reviewed-no changes    Social history reviewed-no changes    Allergies reviewed-no changes    Medications reviewed-no changes    ROS as per HPI, the rest are negative.                Objective     /60 (BP Location: Left arm, Patient Position: Sitting, BP Cuff Size: Adult)   Pulse 99   Temp 36.5 °C (97.7 °F) (Temporal)   Ht 1.829 m (6')   Wt (!) 127 kg (279 lb 15.8 oz)   SpO2 95%   BMI 37.97 kg/m²      Physical Exam     Examined alert, awake, oriented, not in distress    Neck-supple, no lymphadenopathy, no thyromegaly  Lungs-clear to auscultation, no rales, no wheezes  Heart-regular rate and rhythm, no murmur  Extremities-no edema, clubbing, cyanosis             Hospital Outpatient Visit on 03/01/2022   Component Date Value Ref Range Status   • 25-Hydroxy   Vitamin D 25 03/01/2022 48  30 - 100 ng/mL Final    Comment: Adult Ranges:   <20 ng/mL - Deficiency  20-29 ng/mL -  Insufficiency   ng/mL - Sufficiency  Effective 3/18/2020, this electrochemiluminescence binding assay is  performed using Roche shabbir e immunoassay analyzer.  The Ele[employer]ys Vitamin D  total II assay is intended for the quantitative determination of total 25  hydroxyvitamin D in human serum and plasma. This assay is to be used as an  aid in the assessment of vitamin D sufficiency in adults.     • Prostatic Specific Antigen Tot 03/01/2022 1.11  0.00 - 4.00 ng/mL Final    Comment: Performed using Roche shabbir e immunoassay analyzer. tPSA values determined  on patient samples by different testing procedures cannot be directly  compared with one another and could be the cause of erroneous medical  interpretations. If there is a change in the tPSA assay procedure used while  monitoring therapy, then new baselines may need to be established when  comparing previous results.     • Glycohemoglobin 03/01/2022 9.2 (A) 4.0 - 5.6 % Final    Comment: Increased risk for diabetes:  5.7 -6.4%  Diabetes:  >6.4%  Glycemic control for adults with diabetes:  <7.0%    The above interpretations are per ADA guidelines.  Diagnosis  of diabetes mellitus on the basis of elevated Hemoglobin A1c  should be confirmed by repeating the Hb A1c test.     • Est Avg Glucose 03/01/2022 217  mg/dL Final    Comment: The eAG calculation is based on the A1c-Derived Daily Glucose  (ADAG) study.  See the ADA's website for additional information.     • Sodium 03/01/2022 137  135 - 145 mmol/L Final   • Potassium 03/01/2022 4.5  3.6 - 5.5 mmol/L Final   • Chloride 03/01/2022 103  96 - 112 mmol/L Final   • Co2 03/01/2022 23  20 - 33 mmol/L Final   • Anion Gap 03/01/2022 11.0  7.0 - 16.0 Final   • Glucose 03/01/2022 225 (A) 65 - 99 mg/dL Final   • Bun 03/01/2022 32 (A) 8 - 22 mg/dL Final   • Creatinine 03/01/2022 1.89 (A) 0.50 - 1.40 mg/dL Final   • Calcium 03/01/2022 9.1  8.5 - 10.5 mg/dL Final   • AST(SGOT) 03/01/2022 15  12 - 45 U/L Final   • ALT(SGPT)  03/01/2022 14  2 - 50 U/L Final   • Alkaline Phosphatase 03/01/2022 73  30 - 99 U/L Final   • Total Bilirubin 03/01/2022 0.6  0.1 - 1.5 mg/dL Final   • Albumin 03/01/2022 4.2  3.2 - 4.9 g/dL Final   • Total Protein 03/01/2022 6.5  6.0 - 8.2 g/dL Final   • Globulin 03/01/2022 2.3  1.9 - 3.5 g/dL Final   • A-G Ratio 03/01/2022 1.8  g/dL Final   • Cholesterol,Tot 03/01/2022 133  100 - 199 mg/dL Final   • Triglycerides 03/01/2022 152 (A) 0 - 149 mg/dL Final   • HDL 03/01/2022 40  >=40 mg/dL Final   • LDL 03/01/2022 63  <100 mg/dL Final   • Fasting Status 03/01/2022 Fasting   Final   • GFR If  03/01/2022 44 (A) >60 mL/min/1.73 m 2 Final   • GFR If Non  03/01/2022 37 (A) >60 mL/min/1.73 m 2 Final                Assessment & Plan        1. Uncontrolled type 2 diabetes mellitus with microalbuminuria, without long-term current use of insulin (HCC)  This is more out of control than before.  He thinks this is because of dietary indiscretion in the last 3 months.  He does not want any changes in the medication at this time.  We cannot increase the dose of Jardiance because of his CKD.  We may have to add another oral medication such as sulfonylurea.  He wants to hold off on any changes until next visit.  He will work harder on diet, exercise and more weight loss.  I also made a recommendation of sending him to our endocrinology group to get better managed by our endocrinologist but he wants to hold off on that either.  I recommended referral to our diabetes nurse and he agrees to proceed and so he will be set up with an appointment.  Follow-up in 3 months.  - Lipid Profile; Future  - Comp Metabolic Panel; Future  - HEMOGLOBIN A1C; Future    2. Microalbuminuria  Continue lisinopril.  - Lipid Profile; Future  - Comp Metabolic Panel; Future  - HEMOGLOBIN A1C; Future    3. Stage 3b chronic kidney disease (HCC)  Kidney function slightly better than before.  Continue avoidance of nephrotoxic agents.   Continue lisinopril.  Continue follow-up with his nephrologist which is on a yearly basis and due this coming November.  - Lipid Profile; Future  - Comp Metabolic Panel; Future  - HEMOGLOBIN A1C; Future    4. Dyslipidemia  He was able to get the HDL to 40.  Triglycerides slightly high at 152.  Advised to watch the diet in terms of carbs, sweets, regular exercises and weight loss.  LDL at goal.  Continue atorvastatin.    - atorvastatin (LIPITOR) 20 MG Tab; Take 1 Tablet by mouth at bedtime.  Dispense: 90 Tablet; Refill: 1  - Lipid Profile; Future  - Comp Metabolic Panel; Future  - HEMOGLOBIN A1C; Future    5. Vitamin D deficiency  Adequately replaced.  Continue same dose of vitamin D supplementation.    6. Essential hypertension  Controlled on lisinopril.   - lisinopril (PRINIVIL) 10 MG Tab; Take 1 Tablet by mouth every day.  Dispense: 90 Tablet; Refill: 1    7. Need for shingles vaccine  He will get second dose of Shingrix from the pharmacy.  - Zoster Vac Recomb Adjuvanted (SHINGRIX) 50 MCG/0.5ML Recon Susp; Inject 0.5 mL into the shoulder, thigh, or buttocks one time for 1 dose.  Dispense: 1 Each; Refill: 1    Follow-up in 3 months.      Please note that this dictation was created using voice recognition software. I have worked with consultants from the vendor as well as technical experts from [employer] to optimize the interface. I have made every reasonable attempt to correct obvious errors, but I expect that there are errors of grammar and possibly content I did not discover before finalizing the note.

## 2022-03-07 ENCOUNTER — TELEPHONE (OUTPATIENT)
Dept: MEDICAL GROUP | Facility: PHYSICIAN GROUP | Age: 57
End: 2022-03-07
Payer: COMMERCIAL

## 2022-03-07 RX ORDER — EMPAGLIFLOZIN 10 MG/1
1 TABLET, FILM COATED ORAL DAILY
Qty: 90 TABLET | Refills: 1 | Status: SHIPPED | OUTPATIENT
Start: 2022-03-07 | End: 2023-02-15 | Stop reason: SDUPTHER

## 2022-03-07 NOTE — TELEPHONE ENCOUNTER
Call [facility] help desk and they stated that a PA was not needed. Of patient gets a 3 mo supply at a local pharmacy it would be a 120.00 co pay and if he does a mail order it would be 80.00

## 2022-04-13 PROCEDURE — RXMED WILLOW AMBULATORY MEDICATION CHARGE: Performed by: FAMILY MEDICINE

## 2022-04-14 ENCOUNTER — PHARMACY VISIT (OUTPATIENT)
Dept: PHARMACY | Facility: MEDICAL CENTER | Age: 57
End: 2022-04-14
Payer: COMMERCIAL

## 2022-05-26 ENCOUNTER — HOSPITAL ENCOUNTER (OUTPATIENT)
Dept: LAB | Facility: MEDICAL CENTER | Age: 57
End: 2022-05-26
Attending: INTERNAL MEDICINE
Payer: COMMERCIAL

## 2022-05-26 ENCOUNTER — HOSPITAL ENCOUNTER (OUTPATIENT)
Dept: LAB | Facility: MEDICAL CENTER | Age: 57
End: 2022-05-26
Attending: FAMILY MEDICINE
Payer: COMMERCIAL

## 2022-05-26 DIAGNOSIS — I10 ESSENTIAL HYPERTENSION: ICD-10-CM

## 2022-05-26 DIAGNOSIS — N18.32 STAGE 3B CHRONIC KIDNEY DISEASE: ICD-10-CM

## 2022-05-26 DIAGNOSIS — E78.5 DYSLIPIDEMIA: ICD-10-CM

## 2022-05-26 DIAGNOSIS — N18.30 STAGE 3 CHRONIC KIDNEY DISEASE, UNSPECIFIED WHETHER STAGE 3A OR 3B CKD: ICD-10-CM

## 2022-05-26 DIAGNOSIS — G47.33 OSA ON CPAP: ICD-10-CM

## 2022-05-26 DIAGNOSIS — R80.9 MICROALBUMINURIA: ICD-10-CM

## 2022-05-26 LAB
ALBUMIN SERPL BCP-MCNC: 4.2 G/DL (ref 3.2–4.9)
ALBUMIN/GLOB SERPL: 1.8 G/DL
ALP SERPL-CCNC: 73 U/L (ref 30–99)
ALT SERPL-CCNC: 13 U/L (ref 2–50)
ANION GAP SERPL CALC-SCNC: 13 MMOL/L (ref 7–16)
AST SERPL-CCNC: 15 U/L (ref 12–45)
BILIRUB SERPL-MCNC: 0.6 MG/DL (ref 0.1–1.5)
BUN SERPL-MCNC: 44 MG/DL (ref 8–22)
CALCIUM SERPL-MCNC: 9.3 MG/DL (ref 8.5–10.5)
CHLORIDE SERPL-SCNC: 104 MMOL/L (ref 96–112)
CHOLEST SERPL-MCNC: 157 MG/DL (ref 100–199)
CO2 SERPL-SCNC: 22 MMOL/L (ref 20–33)
CREAT SERPL-MCNC: 2.25 MG/DL (ref 0.5–1.4)
ERYTHROCYTE [DISTWIDTH] IN BLOOD BY AUTOMATED COUNT: 47.1 FL (ref 35.9–50)
EST. AVERAGE GLUCOSE BLD GHB EST-MCNC: 240 MG/DL
FASTING STATUS PATIENT QL REPORTED: NORMAL
GFR SERPLBLD CREATININE-BSD FMLA CKD-EPI: 33 ML/MIN/1.73 M 2
GLOBULIN SER CALC-MCNC: 2.3 G/DL (ref 1.9–3.5)
GLUCOSE SERPL-MCNC: 216 MG/DL (ref 65–99)
HBA1C MFR BLD: 10 % (ref 4–5.6)
HCT VFR BLD AUTO: 48.2 % (ref 42–52)
HDLC SERPL-MCNC: 43 MG/DL
HGB BLD-MCNC: 16.2 G/DL (ref 14–18)
LDLC SERPL CALC-MCNC: 81 MG/DL
MCH RBC QN AUTO: 30.8 PG (ref 27–33)
MCHC RBC AUTO-ENTMCNC: 33.6 G/DL (ref 33.7–35.3)
MCV RBC AUTO: 91.6 FL (ref 81.4–97.8)
PLATELET # BLD AUTO: 171 K/UL (ref 164–446)
PMV BLD AUTO: 10.3 FL (ref 9–12.9)
POTASSIUM SERPL-SCNC: 5.3 MMOL/L (ref 3.6–5.5)
PROT SERPL-MCNC: 6.5 G/DL (ref 6–8.2)
RBC # BLD AUTO: 5.26 M/UL (ref 4.7–6.1)
SODIUM SERPL-SCNC: 139 MMOL/L (ref 135–145)
TRIGL SERPL-MCNC: 165 MG/DL (ref 0–149)
WBC # BLD AUTO: 7.1 K/UL (ref 4.8–10.8)

## 2022-05-26 PROCEDURE — 80053 COMPREHEN METABOLIC PANEL: CPT

## 2022-05-26 PROCEDURE — 83036 HEMOGLOBIN GLYCOSYLATED A1C: CPT

## 2022-05-26 PROCEDURE — 80061 LIPID PANEL: CPT

## 2022-05-26 PROCEDURE — 85027 COMPLETE CBC AUTOMATED: CPT

## 2022-05-26 PROCEDURE — 36415 COLL VENOUS BLD VENIPUNCTURE: CPT

## 2022-05-31 ENCOUNTER — OFFICE VISIT (OUTPATIENT)
Dept: MEDICAL GROUP | Facility: PHYSICIAN GROUP | Age: 57
End: 2022-05-31
Payer: COMMERCIAL

## 2022-05-31 VITALS
DIASTOLIC BLOOD PRESSURE: 60 MMHG | HEART RATE: 102 BPM | SYSTOLIC BLOOD PRESSURE: 110 MMHG | TEMPERATURE: 97.8 F | WEIGHT: 274.91 LBS | BODY MASS INDEX: 37.24 KG/M2 | HEIGHT: 72 IN | OXYGEN SATURATION: 97 %

## 2022-05-31 DIAGNOSIS — I10 ESSENTIAL HYPERTENSION: ICD-10-CM

## 2022-05-31 DIAGNOSIS — Z23 NEED FOR SHINGLES VACCINE: ICD-10-CM

## 2022-05-31 DIAGNOSIS — E55.9 VITAMIN D DEFICIENCY: ICD-10-CM

## 2022-05-31 DIAGNOSIS — E78.5 DYSLIPIDEMIA: ICD-10-CM

## 2022-05-31 DIAGNOSIS — N18.32 STAGE 3B CHRONIC KIDNEY DISEASE: ICD-10-CM

## 2022-05-31 PROCEDURE — 99214 OFFICE O/P EST MOD 30 MIN: CPT | Performed by: FAMILY MEDICINE

## 2022-05-31 RX ORDER — ZOSTER VACCINE RECOMBINANT, ADJUVANTED 50 MCG/0.5
0.5 KIT INTRAMUSCULAR ONCE
Qty: 1 EACH | Refills: 0 | Status: SHIPPED | OUTPATIENT
Start: 2022-05-31 | End: 2022-05-31

## 2022-05-31 RX ORDER — ZOSTER VACCINE RECOMBINANT, ADJUVANTED 50 MCG/0.5
0.5 KIT INTRAMUSCULAR ONCE
Qty: 1 EACH | Refills: 0 | Status: SHIPPED | OUTPATIENT
Start: 2022-05-31 | End: 2022-05-31 | Stop reason: SDUPTHER

## 2022-05-31 ASSESSMENT — FIBROSIS 4 INDEX: FIB4 SCORE: 1.41

## 2022-05-31 NOTE — PROGRESS NOTES
Subjective     Bijan Limon is a 56 y.o. male who presents with Diabetes            HPI     Patient is here for follow-up of his medical problems.    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  Patient is already on maximum dose of Ozempic 1 mg once a week and the hemoglobin A1c was still not controlled.  He is tolerating the Jardiance without side effects.  He admits he has not been good in managing his diet.    For the stage IIIb chronic kidney disease, patient continues to follow-up with his nephrologist.  The last visit was in November 2021.  He avoids nephrotoxic agents.    In terms of the dyslipidemia, he continues to take atorvastatin without myalgias.    Continues take vitamin D supplementation for vitamin D deficiency.    Blood pressure is under control on lisinopril without side effects.    He got his first dose of Shingrix vaccine in November 2021 and so he is due for the second dose.    Past medical history, past surgical history, family history reviewed-no changes    Social history reviewed-no changes    Allergies reviewed-no changes    Medications reviewed-no changes    ROS   As per HPI, the rest are negative.             Objective     /60 (BP Location: Left arm, Patient Position: Sitting, BP Cuff Size: Adult)   Pulse (!) 102   Temp 36.6 °C (97.8 °F) (Temporal)   Ht 1.829 m (6')   Wt 125 kg (274 lb 14.6 oz)   SpO2 97%   BMI 37.29 kg/m²      Physical Exam     Examined alert, awake, oriented, not in distress    Neck-supple, no lymphadenopathy, no thyromegaly  Lungs-clear to auscultation, no rales, no wheezes  Heart-regular rate and rhythm, no murmur  Extremities-no edema, clubbing, cyanosis    Monofilament testing with a 10 gram force: sensation intact: He could not feel all the areas tested left foot, he was able to feel only 2 out of 4 in the right foot  Visual  Inspection: Feet without maceration, ulcers, fissures.  Pedal pulses: intact bilaterally             Hospital Outpatient Visit on 05/26/2022   Component Date Value Ref Range Status   • WBC 05/26/2022 7.1  4.8 - 10.8 K/uL Final   • RBC 05/26/2022 5.26  4.70 - 6.10 M/uL Final   • Hemoglobin 05/26/2022 16.2  14.0 - 18.0 g/dL Final   • Hematocrit 05/26/2022 48.2  42.0 - 52.0 % Final   • MCV 05/26/2022 91.6  81.4 - 97.8 fL Final   • MCH 05/26/2022 30.8  27.0 - 33.0 pg Final   • MCHC 05/26/2022 33.6 (A) 33.7 - 35.3 g/dL Final   • RDW 05/26/2022 47.1  35.9 - 50.0 fL Final   • Platelet Count 05/26/2022 171  164 - 446 K/uL Final   • MPV 05/26/2022 10.3  9.0 - 12.9 fL Final        Latest Reference Range & Units 05/26/22 06:36   Sodium 135 - 145 mmol/L 139   Potassium 3.6 - 5.5 mmol/L 5.3   Chloride 96 - 112 mmol/L 104   Co2 20 - 33 mmol/L 22   Anion Gap 7.0 - 16.0  13.0   Glucose 65 - 99 mg/dL 216 (H)   Bun 8 - 22 mg/dL 44 (H)   Creatinine 0.50 - 1.40 mg/dL 2.25 (H)   GFR (CKD-EPI) >60 mL/min/1.73 m 2 33 ! [1]   Calcium 8.5 - 10.5 mg/dL 9.3   AST(SGOT) 12 - 45 U/L 15   ALT(SGPT) 2 - 50 U/L 13   Alkaline Phosphatase 30 - 99 U/L 73   Total Bilirubin 0.1 - 1.5 mg/dL 0.6   Albumin 3.2 - 4.9 g/dL 4.2   Total Protein 6.0 - 8.2 g/dL 6.5   Globulin 1.9 - 3.5 g/dL 2.3   A-G Ratio g/dL 1.8   Glycohemoglobin 4.0 - 5.6 % 10.0 (H) [2]   Estim. Avg Glu mg/dL 240 [3]   Cholesterol,Tot 100 - 199 mg/dL 157   Triglycerides 0 - 149 mg/dL 165 (H)   HDL >=40 mg/dL 43   LDL <100 mg/dL 81   (H): Data is abnormally high  !: Data is abnormal  [1] Effective 3/15/2022, estimated Glomerular Filtration Rate   is calculated using race neutral CKD-EPI 2021 equation   per NKF-ASN recommendations.     [2] Increased risk for diabetes:  5.7 -6.4%   Diabetes:  >6.4%   Glycemic control for adults with diabetes:  <7.0%      The above interpretations are per ADA guidelines.  Diagnosis   of diabetes mellitus on the basis of elevated Hemoglobin A1c   should be  confirmed by repeating the Hb A1c test.     [3] The eAG calculation is based on the A1c-Derived Daily Glucose   (ADAG) study.  See the ADA's website for additional information.            Assessment & Plan        1. Uncontrolled type 2 diabetes mellitus with microalbuminuria, without long-term current use of insulin (HCC)  This is more out of control than before with hemoglobin A1c increased from 9.2-10.0.  Jardiance is contraindicated for EGFR of less than 30.  No adjustment necessary if EGFR is 30 or higher.  He does not want me to increase the Jardiance to use at this time.  He wants to continue the same dose and continue taking the Ozempic.  He wants to work hard on diet, exercise and weight loss first before we change the dose.  I will reevaluate in 3 months.  - Lipid Profile; Future  - Comp Metabolic Panel; Future  - HEMOGLOBIN A1C; Future  - Diabetic Monofilament Lower Extremity Exam    2. Stage 3b chronic kidney disease (HCC)  There is worsening of the kidney function most likely prerenal azotemia on top of the CKD.  He is also more dehydrated than before.  Advised increase p.o. fluids.  Recheck blood work next visit.  - Lipid Profile; Future  - Comp Metabolic Panel; Future  - HEMOGLOBIN A1C; Future    3. Dyslipidemia  All at target except triglycerides are slightly elevated and higher than before.  Watch the carbs and avoid the sweets to improve the triglycerides.  Continue atorvastatin.  - Lipid Profile; Future  - Comp Metabolic Panel; Future  - HEMOGLOBIN A1C; Future    4. Essential hypertension  Controlled on lisinopril.    5. Vitamin D deficiency  The last vitamin D level done in March 2022 came back adequately replaced.  Continue vitamin D supplementation.    6. Need for shingles vaccine  He was given prescription to take to his pharmacy for the second dose of Shingrix as this is not available in our office.  - Zoster Vac Recomb Adjuvanted (SHINGRIX) 50 MCG/0.5ML Recon Susp; Inject 0.5 mL into the  shoulder, thigh, or buttocks one time for 1 dose.  Dispense: 1 Each; Refill: 0    I will see him for follow-up in 3 months.      Please note that this dictation was created using voice recognition software. I have worked with consultants from the vendor as well as technical experts from [facility] to optimize the interface. I have made every reasonable attempt to correct obvious errors, but I expect that there are errors of grammar and possibly content I did not discover before finalizing the note.

## 2022-07-18 ENCOUNTER — HOSPITAL ENCOUNTER (OUTPATIENT)
Dept: LAB | Facility: MEDICAL CENTER | Age: 57
End: 2022-07-18
Attending: INTERNAL MEDICINE
Payer: COMMERCIAL

## 2022-07-18 DIAGNOSIS — N18.30 STAGE 3 CHRONIC KIDNEY DISEASE, UNSPECIFIED WHETHER STAGE 3A OR 3B CKD: ICD-10-CM

## 2022-07-18 DIAGNOSIS — G47.33 OSA ON CPAP: ICD-10-CM

## 2022-07-18 DIAGNOSIS — I10 ESSENTIAL HYPERTENSION: ICD-10-CM

## 2022-07-18 LAB
ANION GAP SERPL CALC-SCNC: 12 MMOL/L (ref 7–16)
BUN SERPL-MCNC: 40 MG/DL (ref 8–22)
CALCIUM SERPL-MCNC: 9.3 MG/DL (ref 8.5–10.5)
CHLORIDE SERPL-SCNC: 102 MMOL/L (ref 96–112)
CO2 SERPL-SCNC: 22 MMOL/L (ref 20–33)
CREAT SERPL-MCNC: 2.17 MG/DL (ref 0.5–1.4)
CREAT UR-MCNC: 81.44 MG/DL
GFR SERPLBLD CREATININE-BSD FMLA CKD-EPI: 34 ML/MIN/1.73 M 2
GLUCOSE SERPL-MCNC: 148 MG/DL (ref 65–99)
MICROALBUMIN UR-MCNC: 6.5 MG/DL
MICROALBUMIN/CREAT UR: 80 MG/G (ref 0–30)
POTASSIUM SERPL-SCNC: 4.6 MMOL/L (ref 3.6–5.5)
SODIUM SERPL-SCNC: 136 MMOL/L (ref 135–145)

## 2022-07-18 PROCEDURE — 80048 BASIC METABOLIC PNL TOTAL CA: CPT

## 2022-07-18 PROCEDURE — 82043 UR ALBUMIN QUANTITATIVE: CPT

## 2022-07-18 PROCEDURE — 82570 ASSAY OF URINE CREATININE: CPT

## 2022-07-18 PROCEDURE — 36415 COLL VENOUS BLD VENIPUNCTURE: CPT

## 2022-07-20 ENCOUNTER — OFFICE VISIT (OUTPATIENT)
Dept: NEPHROLOGY | Facility: MEDICAL CENTER | Age: 57
End: 2022-07-20
Payer: COMMERCIAL

## 2022-07-20 VITALS
SYSTOLIC BLOOD PRESSURE: 110 MMHG | TEMPERATURE: 97.4 F | HEART RATE: 104 BPM | RESPIRATION RATE: 12 BRPM | DIASTOLIC BLOOD PRESSURE: 60 MMHG | WEIGHT: 276 LBS | BODY MASS INDEX: 37.43 KG/M2 | OXYGEN SATURATION: 95 %

## 2022-07-20 DIAGNOSIS — E11.22 TYPE 2 DIABETES MELLITUS WITH STAGE 3 CHRONIC KIDNEY DISEASE, UNSPECIFIED WHETHER LONG TERM INSULIN USE, UNSPECIFIED WHETHER STAGE 3A OR 3B CKD (HCC): ICD-10-CM

## 2022-07-20 DIAGNOSIS — I10 ESSENTIAL HYPERTENSION: ICD-10-CM

## 2022-07-20 DIAGNOSIS — N18.30 STAGE 3 CHRONIC KIDNEY DISEASE, UNSPECIFIED WHETHER STAGE 3A OR 3B CKD: ICD-10-CM

## 2022-07-20 DIAGNOSIS — N18.30 TYPE 2 DIABETES MELLITUS WITH STAGE 3 CHRONIC KIDNEY DISEASE, UNSPECIFIED WHETHER LONG TERM INSULIN USE, UNSPECIFIED WHETHER STAGE 3A OR 3B CKD (HCC): ICD-10-CM

## 2022-07-20 PROCEDURE — 99214 OFFICE O/P EST MOD 30 MIN: CPT | Performed by: INTERNAL MEDICINE

## 2022-07-20 ASSESSMENT — ENCOUNTER SYMPTOMS
HYPERTENSION: 1
SHORTNESS OF BREATH: 0
COUGH: 0
NAUSEA: 0
CHILLS: 0
FEVER: 0
VOMITING: 0

## 2022-07-20 ASSESSMENT — FIBROSIS 4 INDEX: FIB4 SCORE: 1.41

## 2022-07-20 NOTE — PROGRESS NOTES
Subjective     Bijan Limon is a 56 y.o. male who presents with Chronic Kidney Disease and Hypertension            Chronic Kidney Disease  This is a chronic problem. The current episode started more than 1 year ago. The problem occurs constantly. The problem has been unchanged. Pertinent negatives include no chest pain, chills, coughing, fever, nausea, urinary symptoms or vomiting.   Hypertension  This is a chronic problem. The current episode started more than 1 year ago. The problem is unchanged. The problem is controlled. Pertinent negatives include no chest pain, malaise/fatigue, peripheral edema or shortness of breath. Risk factors for coronary artery disease include male gender, obesity and diabetes mellitus. Past treatments include ACE inhibitors. The current treatment provides significant improvement. There are no compliance problems.  Hypertensive end-organ damage includes kidney disease. Identifiable causes of hypertension include chronic renal disease.       Review of Systems   Constitutional: Negative for chills, fever and malaise/fatigue.   Respiratory: Negative for cough and shortness of breath.    Cardiovascular: Negative for chest pain and leg swelling.   Gastrointestinal: Negative for nausea and vomiting.   Genitourinary: Negative for dysuria, frequency and urgency.              Objective     /60   Pulse (!) 104   Temp 36.3 °C (97.4 °F) (Temporal)   Resp 12   Wt (!) 125 kg (276 lb)   SpO2 95%   BMI 37.43 kg/m²      Physical Exam  Vitals and nursing note reviewed.   Constitutional:       General: He is not in acute distress.     Appearance: He is not ill-appearing.   HENT:      Head: Normocephalic and atraumatic.      Right Ear: External ear normal.      Left Ear: External ear normal.      Nose: Nose normal.   Eyes:      General:         Right eye: No discharge.         Left eye: No discharge.      Conjunctiva/sclera: Conjunctivae normal.   Cardiovascular:      Rate and Rhythm: Normal rate  and regular rhythm.      Heart sounds: No murmur heard.  Pulmonary:      Effort: Pulmonary effort is normal. No respiratory distress.      Breath sounds: Normal breath sounds. No wheezing.   Musculoskeletal:         General: No tenderness or deformity.      Right lower leg: No edema.      Left lower leg: No edema.   Skin:     General: Skin is warm.   Neurological:      General: No focal deficit present.      Mental Status: He is alert and oriented to person, place, and time.   Psychiatric:         Mood and Affect: Mood normal.         Behavior: Behavior normal.       Past Medical History:   Diagnosis Date   • Chickenpox    • Diabetes (HCC)    • ED (erectile dysfunction)    • HTN (hypertension)    • Obesity    • YOVANA on CPAP    • Sleep apnea      Social History     Socioeconomic History   • Marital status:      Spouse name: Not on file   • Number of children: 0   • Years of education: Not on file   • Highest education level: Bachelor's degree (e.g., BA, AB, BS)   Occupational History   • Occupation: controller - [employer]   Tobacco Use   • Smoking status: Never Smoker   • Smokeless tobacco: Never Used   Vaping Use   • Vaping Use: Never used   Substance and Sexual Activity   • Alcohol use: Yes     Alcohol/week: 0.0 oz     Comment: rare    • Drug use: No   • Sexual activity: Yes     Partners: Female   Other Topics Concern   • Not on file   Social History Narrative   • Not on file     Social Determinants of Health     Financial Resource Strain: Low Risk    • Difficulty of Paying Living Expenses: Not hard at all   Food Insecurity: No Food Insecurity   • Worried About Running Out of Food in the Last Year: Never true   • Ran Out of Food in the Last Year: Never true   Transportation Needs: No Transportation Needs   • Lack of Transportation (Medical): No   • Lack of Transportation (Non-Medical): No   Physical Activity: Insufficiently Active   • Days of Exercise per Week: 3 days   • Minutes of Exercise per Session:  40 min   Stress: No Stress Concern Present   • Feeling of Stress : Only a little   Social Connections: Moderately Isolated   • Frequency of Communication with Friends and Family: Twice a week   • Frequency of Social Gatherings with Friends and Family: Once a week   • Attends Yazidi Services: Never   • Active Member of Clubs or Organizations: No   • Attends Club or Organization Meetings: Never   • Marital Status:    Intimate Partner Violence: Not on file   Housing Stability: Low Risk    • Unable to Pay for Housing in the Last Year: No   • Number of Places Lived in the Last Year: 1   • Unstable Housing in the Last Year: No     Family History   Problem Relation Age of Onset   • Diabetes Father    • Hyperlipidemia Father    • Stroke Father    • Sleep Apnea Neg Hx      Recent Labs     10/01/21  0810 03/01/22  0615 05/26/22  0636 07/18/22  1508   ALBUMIN 3.8 4.2 4.2  --    HDL 33* 40 43  --    TRIGLYCERIDE 116 152* 165*  --    SODIUM 135 137 139 136   POTASSIUM 4.3 4.5 5.3 4.6   CHLORIDE 102 103 104 102   CO2 25 23 22 22   BUN 20 32* 44* 40*   CREATININE 1.93* 1.89* 2.25* 2.17*                             Assessment & Plan        1. Stage 3 chronic kidney disease, unspecified whether stage 3a or 3b CKD (HCC)  Stable  No uremic symptoms  Renal dose of medication  Avoid nephrotoxins  Continue same medication regimen      2. Essential hypertension  Controlled  Continue same medication regimen  Continue low-sodium diet      3. Type 2 diabetes mellitus with stage 3 chronic kidney disease, unspecified whether long term insulin use, unspecified whether stage 3a or 3b CKD (HCC)  Uncontrolled  Continue to optimize diabetes control  Consider endocrinology evaluation

## 2022-09-16 ENCOUNTER — PHARMACY VISIT (OUTPATIENT)
Dept: PHARMACY | Facility: MEDICAL CENTER | Age: 57
End: 2022-09-16
Payer: COMMERCIAL

## 2022-09-16 PROCEDURE — RXMED WILLOW AMBULATORY MEDICATION CHARGE: Performed by: FAMILY MEDICINE

## 2022-11-17 ENCOUNTER — OFFICE VISIT (OUTPATIENT)
Dept: MEDICAL GROUP | Facility: LAB | Age: 57
End: 2022-11-17
Payer: COMMERCIAL

## 2022-11-17 VITALS
DIASTOLIC BLOOD PRESSURE: 76 MMHG | HEART RATE: 111 BPM | RESPIRATION RATE: 12 BRPM | HEIGHT: 72 IN | TEMPERATURE: 97.3 F | BODY MASS INDEX: 37.65 KG/M2 | SYSTOLIC BLOOD PRESSURE: 132 MMHG | WEIGHT: 278 LBS | OXYGEN SATURATION: 96 %

## 2022-11-17 DIAGNOSIS — E78.5 DYSLIPIDEMIA: ICD-10-CM

## 2022-11-17 DIAGNOSIS — E08.65 DIABETES MELLITUS DUE TO UNDERLYING CONDITION, UNCONTROLLED, WITH HYPERGLYCEMIA (HCC): ICD-10-CM

## 2022-11-17 DIAGNOSIS — N18.32 STAGE 3B CHRONIC KIDNEY DISEASE: ICD-10-CM

## 2022-11-17 PROCEDURE — 99214 OFFICE O/P EST MOD 30 MIN: CPT | Performed by: FAMILY MEDICINE

## 2022-11-17 RX ORDER — ATORVASTATIN CALCIUM 20 MG/1
20 TABLET, FILM COATED ORAL
Qty: 90 TABLET | Refills: 1 | Status: SHIPPED | OUTPATIENT
Start: 2022-11-17 | End: 2023-07-28 | Stop reason: SDUPTHER

## 2022-11-17 ASSESSMENT — FIBROSIS 4 INDEX: FIB4 SCORE: 1.44

## 2022-11-17 NOTE — PROGRESS NOTES
Subjective:     Chief Complaint   Patient presents with    Establish Care         HPI:   Pankaj presents today to establish care.     Type 2 diabetes:   Some stressors the last few months.   Home, work.   Last A1C in May 10%.   Diet hasnt been great. Exercise not too much.   Ozempic and jardiance. No concerns with meds.     Some tingling in feet. Nothing in hands.       Current Outpatient Medications Ordered in Epic   Medication Sig Dispense Refill    Empagliflozin (JARDIANCE) 10 MG Tab Take 1 tablet by mouth every day. 90 Tablet 1    Cyanocobalamin (VITAMIN B 12 PO) Take  by mouth.      lisinopril (PRINIVIL) 10 MG Tab Take 1 Tablet by mouth every day. 90 Tablet 1    atorvastatin (LIPITOR) 20 MG Tab Take 1 Tablet by mouth at bedtime. 90 Tablet 1    OZEMPIC, 1 MG/DOSE, 4 MG/3ML Solution Pen-injector INJECT 1MG SUBCUTANEOUSLY ONCE WEEKLY 18 mL 1    DIMENHYDRINATE PO Take  by mouth. (Patient not taking: Reported on 7/20/2022)      sildenafil citrate (VIAGRA) 100 MG tablet TAKE ONE TABLET BY MOUTH DAILY AS NEEDED FOR ERECTILE DYSFUNCTION 90 Tablet 0    aspirin EC (ECOTRIN) 81 MG Tablet Delayed Response Take 81 mg by mouth every morning.      colchicine (COLCRYS) 0.6 MG Tab TAKE 2 TABLETS BY MOUTH AT FIRST ONSET OF GOUT AND TAKE ANOTHER TABLET AFTER 1 HOUR 30 Tab 1    Blood Glucose Monitoring Suppl Device Meter: Dispense Device of Insurance Preference. Sig. Use as directed for blood sugar monitoring. #1. NR. 1 Device 0    Blood Glucose Test Strips Test strips order: Test strips for glucometer. Sig: use once daily before breakfast. 50 Strip 5    Blood Glucose Test Strips Test strips order: Test strips for glucometer. Sig: use  Once daily before breakfast. 50 Strip 5    Cholecalciferol (VITAMIN D) 2000 UNIT Tab Take 6,000 Units by mouth every morning. 3 tablets = 6000 units       No current Epic-ordered facility-administered medications on file.         ROS:  Gen: no fevers/chills, no changes in weight  Eyes: no changes  in vision  ENT: no sore throat, no hearing loss, no bloody nose  Pulm: no sob, no cough  CV: no chest pain, no palpitations  GI: no nausea/vomiting, no diarrhea  : no dysuria  MSk: no myalgias  Skin: no rash  Neuro: no headaches, no numbness/tingling  Heme/Lymph: no easy bruising      Objective:     Exam:  /76 (BP Location: Right arm, Patient Position: Sitting, BP Cuff Size: Adult)   Pulse (!) 111   Temp 36.3 °C (97.3 °F)   Resp 12   Ht 1.829 m (6')   Wt (!) 126 kg (278 lb)   SpO2 96%   BMI 37.70 kg/m²  Body mass index is 37.7 kg/m².    Gen: Alert and oriented, No apparent distress.  Neck: Neck is supple without lymphadenopathy.  Lungs: Normal effort, CTA bilaterally, no wheezes, rhonchi, or rales  CV: Regular rate and rhythm. No murmurs, rubs, or gallops.  Ext: No clubbing, cyanosis, edema.      Assessment & Plan:     57 y.o. male with the following -     1. Stage 3b chronic kidney disease (HCC)  Will update labs. Continiue care with nephrology as well.   - MICROALBUMIN CREAT RATIO URINE; Future  - CBC WITH DIFFERENTIAL; Future    2. Diabetes mellitus due to underlying condition, uncontrolled, with hyperglycemia (HCC)  Discussed imporoved control. Will get updated labs. wIll liekly need to add agent to his regimen if he cant make lifestyle changes.   - HEMOGLOBIN A1C; Future  - Basic Metabolic Panel; Future    3. Dyslipidemia  Chronic, stable. Refills sent in.   - atorvastatin (LIPITOR) 20 MG Tab; Take 1 Tablet by mouth at bedtime.  Dispense: 90 Tablet; Refill: 1            No follow-ups on file.    Please note that this dictation was created using voice recognition software. I have made every reasonable attempt to correct obvious errors, but I expect that there are errors of grammar and possibly content that I did not discover before finalizing the note.

## 2023-01-18 DIAGNOSIS — N52.9 ED (ERECTILE DYSFUNCTION): ICD-10-CM

## 2023-01-18 RX ORDER — SILDENAFIL 100 MG/1
TABLET, FILM COATED ORAL
Qty: 90 TABLET | Refills: 0 | Status: SHIPPED | OUTPATIENT
Start: 2023-01-18 | End: 2023-05-04

## 2023-02-14 ENCOUNTER — HOSPITAL ENCOUNTER (OUTPATIENT)
Dept: LAB | Facility: MEDICAL CENTER | Age: 58
End: 2023-02-14
Attending: FAMILY MEDICINE
Payer: COMMERCIAL

## 2023-02-14 DIAGNOSIS — N18.32 STAGE 3B CHRONIC KIDNEY DISEASE: ICD-10-CM

## 2023-02-14 DIAGNOSIS — E08.65 DIABETES MELLITUS DUE TO UNDERLYING CONDITION, UNCONTROLLED, WITH HYPERGLYCEMIA (HCC): ICD-10-CM

## 2023-02-14 LAB
ANION GAP SERPL CALC-SCNC: 13 MMOL/L (ref 7–16)
BASOPHILS # BLD AUTO: 0.6 % (ref 0–1.8)
BASOPHILS # BLD: 0.05 K/UL (ref 0–0.12)
BUN SERPL-MCNC: 32 MG/DL (ref 8–22)
CALCIUM SERPL-MCNC: 9.8 MG/DL (ref 8.5–10.5)
CHLORIDE SERPL-SCNC: 100 MMOL/L (ref 96–112)
CO2 SERPL-SCNC: 23 MMOL/L (ref 20–33)
CREAT SERPL-MCNC: 2.07 MG/DL (ref 0.5–1.4)
CREAT UR-MCNC: 121.75 MG/DL
EOSINOPHIL # BLD AUTO: 0.24 K/UL (ref 0–0.51)
EOSINOPHIL NFR BLD: 2.9 % (ref 0–6.9)
ERYTHROCYTE [DISTWIDTH] IN BLOOD BY AUTOMATED COUNT: 46.1 FL (ref 35.9–50)
GFR SERPLBLD CREATININE-BSD FMLA CKD-EPI: 36 ML/MIN/1.73 M 2
GLUCOSE SERPL-MCNC: 163 MG/DL (ref 65–99)
HCT VFR BLD AUTO: 57.8 % (ref 42–52)
HGB BLD-MCNC: 20.2 G/DL (ref 14–18)
IMM GRANULOCYTES # BLD AUTO: 0.04 K/UL (ref 0–0.11)
IMM GRANULOCYTES NFR BLD AUTO: 0.5 % (ref 0–0.9)
LYMPHOCYTES # BLD AUTO: 1.88 K/UL (ref 1–4.8)
LYMPHOCYTES NFR BLD: 22.5 % (ref 22–41)
MCH RBC QN AUTO: 31.8 PG (ref 27–33)
MCHC RBC AUTO-ENTMCNC: 34.9 G/DL (ref 33.7–35.3)
MCV RBC AUTO: 90.9 FL (ref 81.4–97.8)
MICROALBUMIN UR-MCNC: 38.6 MG/DL
MICROALBUMIN/CREAT UR: 317 MG/G (ref 0–30)
MONOCYTES # BLD AUTO: 0.73 K/UL (ref 0–0.85)
MONOCYTES NFR BLD AUTO: 8.7 % (ref 0–13.4)
NEUTROPHILS # BLD AUTO: 5.43 K/UL (ref 1.82–7.42)
NEUTROPHILS NFR BLD: 64.8 % (ref 44–72)
NRBC # BLD AUTO: 0 K/UL
NRBC BLD-RTO: 0 /100 WBC
PLATELET # BLD AUTO: 181 K/UL (ref 164–446)
PMV BLD AUTO: 10.3 FL (ref 9–12.9)
POTASSIUM SERPL-SCNC: 4.3 MMOL/L (ref 3.6–5.5)
RBC # BLD AUTO: 6.36 M/UL (ref 4.7–6.1)
SODIUM SERPL-SCNC: 136 MMOL/L (ref 135–145)
WBC # BLD AUTO: 8.4 K/UL (ref 4.8–10.8)

## 2023-02-14 PROCEDURE — 85025 COMPLETE CBC W/AUTO DIFF WBC: CPT

## 2023-02-14 PROCEDURE — 83036 HEMOGLOBIN GLYCOSYLATED A1C: CPT

## 2023-02-14 PROCEDURE — 82570 ASSAY OF URINE CREATININE: CPT

## 2023-02-14 PROCEDURE — 36415 COLL VENOUS BLD VENIPUNCTURE: CPT

## 2023-02-14 PROCEDURE — 80048 BASIC METABOLIC PNL TOTAL CA: CPT

## 2023-02-14 PROCEDURE — 82043 UR ALBUMIN QUANTITATIVE: CPT

## 2023-02-15 ENCOUNTER — OFFICE VISIT (OUTPATIENT)
Dept: NEPHROLOGY | Facility: MEDICAL CENTER | Age: 58
End: 2023-02-15
Payer: COMMERCIAL

## 2023-02-15 VITALS
HEART RATE: 101 BPM | TEMPERATURE: 97.5 F | BODY MASS INDEX: 36.57 KG/M2 | DIASTOLIC BLOOD PRESSURE: 76 MMHG | WEIGHT: 270 LBS | HEIGHT: 72 IN | OXYGEN SATURATION: 96 % | SYSTOLIC BLOOD PRESSURE: 114 MMHG

## 2023-02-15 DIAGNOSIS — I10 ESSENTIAL HYPERTENSION: ICD-10-CM

## 2023-02-15 DIAGNOSIS — N18.30 STAGE 3 CHRONIC KIDNEY DISEASE, UNSPECIFIED WHETHER STAGE 3A OR 3B CKD: ICD-10-CM

## 2023-02-15 DIAGNOSIS — N18.30 TYPE 2 DIABETES MELLITUS WITH STAGE 3 CHRONIC KIDNEY DISEASE, UNSPECIFIED WHETHER LONG TERM INSULIN USE, UNSPECIFIED WHETHER STAGE 3A OR 3B CKD (HCC): ICD-10-CM

## 2023-02-15 DIAGNOSIS — E11.22 TYPE 2 DIABETES MELLITUS WITH STAGE 3 CHRONIC KIDNEY DISEASE, UNSPECIFIED WHETHER LONG TERM INSULIN USE, UNSPECIFIED WHETHER STAGE 3A OR 3B CKD (HCC): ICD-10-CM

## 2023-02-15 DIAGNOSIS — E11.9 TYPE 2 DIABETES MELLITUS (HCC): ICD-10-CM

## 2023-02-15 LAB
EST. AVERAGE GLUCOSE BLD GHB EST-MCNC: 217 MG/DL
HBA1C MFR BLD: 9.2 % (ref 4–5.6)

## 2023-02-15 PROCEDURE — 99214 OFFICE O/P EST MOD 30 MIN: CPT | Performed by: INTERNAL MEDICINE

## 2023-02-15 RX ORDER — LISINOPRIL 10 MG/1
10 TABLET ORAL DAILY
Qty: 90 TABLET | Refills: 1 | Status: SHIPPED | OUTPATIENT
Start: 2023-02-15 | End: 2023-07-28 | Stop reason: SDUPTHER

## 2023-02-15 RX ORDER — EMPAGLIFLOZIN 10 MG/1
1 TABLET, FILM COATED ORAL DAILY
Qty: 90 TABLET | Refills: 1 | Status: SHIPPED | OUTPATIENT
Start: 2023-02-15 | End: 2023-02-17

## 2023-02-15 ASSESSMENT — ENCOUNTER SYMPTOMS
NAUSEA: 0
VOMITING: 0
COUGH: 0
HYPERTENSION: 1
SHORTNESS OF BREATH: 0
CHILLS: 0
FEVER: 0

## 2023-02-15 ASSESSMENT — FIBROSIS 4 INDEX: FIB4 SCORE: 1.36

## 2023-02-15 NOTE — TELEPHONE ENCOUNTER
Received request via: Pharmacy    Was the patient seen in the last year in this department? Yes    Does the patient have an active prescription (recently filled or refills available) for medication(s) requested? No    Does the patient have [facility] Plus and need 100 day supply (blood pressure, diabetes and cholesterol meds only)? Patient does not have SCP   no

## 2023-02-15 NOTE — TELEPHONE ENCOUNTER
Received request via: Pharmacy    Was the patient seen in the last year in this department? Yes  11/17/22  Does the patient have an active prescription (recently filled or refills available) for medication(s) requested? No    Does the patient have [facility] and need 100 day supply (blood pressure, diabetes and cholesterol meds only)?

## 2023-02-16 NOTE — PROGRESS NOTES
Subjective     Bijan Limon is a 57 y.o. male who presents with Chronic Kidney Disease and Hypertension            Chronic Kidney Disease  This is a chronic problem. The current episode started more than 1 year ago. The problem occurs constantly. The problem has been unchanged. Pertinent negatives include no chest pain, chills, coughing, fever, nausea, urinary symptoms or vomiting.   Hypertension  This is a chronic problem. The current episode started more than 1 year ago. The problem is unchanged. The problem is controlled. Pertinent negatives include no chest pain, malaise/fatigue, peripheral edema or shortness of breath. Risk factors for coronary artery disease include male gender, diabetes mellitus and obesity. Past treatments include ACE inhibitors. The current treatment provides significant improvement. There are no compliance problems.  Hypertensive end-organ damage includes kidney disease. Identifiable causes of hypertension include chronic renal disease.     Review of Systems   Constitutional:  Negative for chills, fever and malaise/fatigue.   Respiratory:  Negative for cough and shortness of breath.    Cardiovascular:  Negative for chest pain and leg swelling.   Gastrointestinal:  Negative for nausea and vomiting.   Genitourinary:  Negative for dysuria, frequency and urgency.            Objective     /76 (BP Location: Right arm, Patient Position: Sitting, BP Cuff Size: Adult)   Pulse (!) 101   Temp 36.4 °C (97.5 °F) (Temporal)   Ht 1.829 m (6')   Wt 122 kg (270 lb)   SpO2 96%   BMI 36.62 kg/m²      Physical Exam  Vitals and nursing note reviewed.   Constitutional:       General: He is not in acute distress.     Appearance: He is not ill-appearing.   HENT:      Head: Normocephalic and atraumatic.      Right Ear: External ear normal.      Left Ear: External ear normal.      Nose: Nose normal.   Eyes:      General:         Right eye: No discharge.         Left eye: No discharge.       Conjunctiva/sclera: Conjunctivae normal.   Cardiovascular:      Rate and Rhythm: Normal rate and regular rhythm.      Heart sounds: No murmur heard.  Pulmonary:      Effort: Pulmonary effort is normal. No respiratory distress.      Breath sounds: Normal breath sounds. No wheezing.   Musculoskeletal:         General: No tenderness or deformity.      Right lower leg: No edema.      Left lower leg: No edema.   Skin:     General: Skin is warm.   Neurological:      General: No focal deficit present.      Mental Status: He is alert and oriented to person, place, and time.   Psychiatric:         Mood and Affect: Mood normal.         Behavior: Behavior normal.     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   Tobacco Use    Smoking status: Never    Smokeless tobacco: Never   Vaping Use    Vaping Use: Never used   Substance and Sexual Activity    Alcohol use: Yes     Alcohol/week: 0.0 oz     Comment: rare     Drug use: No    Sexual activity: Yes     Partners: Female   Other Topics Concern    Not on file   Social History Narrative    Not on file     Social Determinants of Health     Financial Resource Strain: Not on file   Food Insecurity: Not on file   Transportation Needs: Not on file   Physical Activity: Not on file   Stress: Not on file   Social Connections: Not on file   Intimate Partner Violence: Not on file   Housing Stability: Not on file     Family History   Problem Relation Age of Onset    Diabetes Father     Hyperlipidemia Father     Stroke Father     Sleep Apnea Neg Hx      Recent Labs     03/01/22  0615  05/26/22  0636 07/18/22  1508 02/14/23  1223   ALBUMIN 4.2 4.2  --   --    HDL 40 43  --   --    TRIGLYCERIDE 152* 165*  --   --    SODIUM 137 139 136 136   POTASSIUM 4.5 5.3 4.6 4.3   CHLORIDE 103 104 102 100   CO2 23 22 22 23   BUN 32* 44* 40* 32*   CREATININE 1.89* 2.25* 2.17* 2.07*                           Assessment & Plan        1. Stage 3 chronic kidney disease, unspecified whether stage 3a or 3b CKD (HCC)  Stable  No uremic symptoms  Renal dose of medication  Avoid nephrotoxins  Continue same medication regimen  Patient was advised to call us if symptoms worsen      2. Essential hypertension  Controlled  Continue same medication regimen  Continue low-sodium diet      3. Type 2 diabetes mellitus with stage 3 chronic kidney disease, unspecified whether long term insulin use, unspecified whether stage 3a or 3b CKD (HCC)  Not well controlled  Continue to optimize diabetes control for hemoglobin A1c  below 7%  Referral to Endocrinology

## 2023-02-17 ENCOUNTER — OFFICE VISIT (OUTPATIENT)
Dept: MEDICAL GROUP | Facility: LAB | Age: 58
End: 2023-02-17
Payer: COMMERCIAL

## 2023-02-17 VITALS
DIASTOLIC BLOOD PRESSURE: 78 MMHG | OXYGEN SATURATION: 96 % | RESPIRATION RATE: 12 BRPM | HEIGHT: 72 IN | BODY MASS INDEX: 36.03 KG/M2 | SYSTOLIC BLOOD PRESSURE: 122 MMHG | TEMPERATURE: 97 F | WEIGHT: 266 LBS | HEART RATE: 105 BPM

## 2023-02-17 DIAGNOSIS — G47.33 OSA ON CPAP: ICD-10-CM

## 2023-02-17 DIAGNOSIS — E11.9 TYPE 2 DIABETES MELLITUS (HCC): ICD-10-CM

## 2023-02-17 DIAGNOSIS — E08.65 DIABETES MELLITUS DUE TO UNDERLYING CONDITION, UNCONTROLLED, WITH HYPERGLYCEMIA (HCC): ICD-10-CM

## 2023-02-17 PROCEDURE — RXMED WILLOW AMBULATORY MEDICATION CHARGE: Performed by: FAMILY MEDICINE

## 2023-02-17 PROCEDURE — 99214 OFFICE O/P EST MOD 30 MIN: CPT | Performed by: FAMILY MEDICINE

## 2023-02-17 ASSESSMENT — FIBROSIS 4 INDEX: FIB4 SCORE: 1.36

## 2023-02-17 ASSESSMENT — PATIENT HEALTH QUESTIONNAIRE - PHQ9: CLINICAL INTERPRETATION OF PHQ2 SCORE: 0

## 2023-02-17 NOTE — PROGRESS NOTES
Subjective:     Chief Complaint   Patient presents with    Follow-Up     labs         HPI:   Pankaj presents today with follow up diabetes and labs. A1C remains very high. Renal function continues to be poor. Sees nephrology.     Not very compliant with cpap. Not very comfortable with mask.     Diet: not very carb controlled.   Exercise: Not much at all.   Stressors have been horrible. These are settling. Going to [place] soon for a week.         Current Outpatient Medications Ordered in Epic   Medication Sig Dispense Refill    Empagliflozin (JARDIANCE) 10 MG Tab tablet Take 1 tablet by mouth every day. 90 Tablet 1    lisinopril (PRINIVIL) 10 MG Tab Take 1 Tablet by mouth every day. 90 Tablet 1    sildenafil citrate (VIAGRA) 100 MG tablet TAKE ONE TABLET BY MOUTH DAILY AS NEEDED FOR ERECTILE DYSFUNCTION 90 Tablet 0    atorvastatin (LIPITOR) 20 MG Tab Take 1 Tablet by mouth at bedtime. 90 Tablet 1    Cyanocobalamin (VITAMIN B 12 PO) Take  by mouth.      OZEMPIC, 1 MG/DOSE, 4 MG/3ML Solution Pen-injector INJECT 1MG SUBCUTANEOUSLY ONCE WEEKLY 18 mL 1    aspirin EC (ECOTRIN) 81 MG Tablet Delayed Response Take 81 mg by mouth every morning.      colchicine (COLCRYS) 0.6 MG Tab TAKE 2 TABLETS BY MOUTH AT FIRST ONSET OF GOUT AND TAKE ANOTHER TABLET AFTER 1 HOUR 30 Tab 1    Blood Glucose Monitoring Suppl Device Meter: Dispense Device of Insurance Preference. Sig. Use as directed for blood sugar monitoring. #1. NR. 1 Device 0    Blood Glucose Test Strips Test strips order: Test strips for glucometer. Sig: use once daily before breakfast. 50 Strip 5    Blood Glucose Test Strips Test strips order: Test strips for glucometer. Sig: use  Once daily before breakfast. 50 Strip 5    Cholecalciferol (VITAMIN D) 2000 UNIT Tab Take 6,000 Units by mouth every morning. 3 tablets = 6000 units       No current Epic-ordered facility-administered medications on file.         ROS:  Gen: no fevers/chills, no changes in weight  Eyes: no changes  in vision  ENT: no sore throat, no hearing loss, no bloody nose  Pulm: no sob, no cough  CV: no chest pain, no palpitations  GI: no nausea/vomiting, no diarrhea  : no dysuria  MSk: no myalgias  Skin: no rash  Neuro: no headaches, no numbness/tingling  Heme/Lymph: no easy bruising      Objective:     Exam:  /78 (BP Location: Right arm, Patient Position: Sitting, BP Cuff Size: Adult)   Pulse (!) 105   Temp 36.1 °C (97 °F)   Resp 12   Ht 1.829 m (6')   Wt 121 kg (266 lb)   SpO2 96%   BMI 36.08 kg/m²  Body mass index is 36.08 kg/m².    Gen: AAOx3, NAD, well appearing  HEENT: NCAT, EOMI, Nares patent, Mucosa moist  Resp: Normal chest wall rise and fall, not SOB, no tachypnea  Skin: no rash or abnormality of visible skin.   Psych: normal speech, not slurred, good insight, affect full  MSK: Moves all four limbs equally and normally, gait normal      Assessment & Plan:     57 y.o. male with the following -   1. Type 2 diabetes mellitus (HCC)  Uncontrolled. Discussed endocrine referral and possible insulin start. Discussed dietary and exercise adjustments. Will increase Jardiance.   - Empagliflozin 25 MG Tab; Take one tablet by mouth every day.  Dispense: 90 Tablet; Refill: 3    2. YOVANA on CPAP  Discussed the importance of sleep and sugar control. Will get him referred to sleep medicine to discuss options.   - Referral to Pulmonary and Sleep Medicine    3. Diabetes mellitus due to underlying condition, uncontrolled, with hyperglycemia (HCC)  See above. Risks for stroke and heart attack remain high, and further renal injury as well.               No follow-ups on file.    Please note that this dictation was created using voice recognition software. I have made every reasonable attempt to correct obvious errors, but I expect that there are errors of grammar and possibly content that I did not discover before finalizing the note.

## 2023-02-22 ENCOUNTER — PHARMACY VISIT (OUTPATIENT)
Dept: PHARMACY | Facility: MEDICAL CENTER | Age: 58
End: 2023-02-22
Payer: COMMERCIAL

## 2023-03-02 ENCOUNTER — TELEPHONE (OUTPATIENT)
Dept: HEALTH INFORMATION MANAGEMENT | Facility: OTHER | Age: 58
End: 2023-03-02
Payer: COMMERCIAL

## 2023-03-15 RX ORDER — SEMAGLUTIDE 1.34 MG/ML
INJECTION, SOLUTION SUBCUTANEOUS
Qty: 18 ML | Refills: 1 | Status: SHIPPED | OUTPATIENT
Start: 2023-03-15 | End: 2023-07-28

## 2023-03-15 NOTE — TELEPHONE ENCOUNTER
Received request via: Patient    Was the patient seen in the last year in this department? Yes  2/17/23  Does the patient have an active prescription (recently filled or refills available) for medication(s) requested? No    Does the patient have half-way Plus and need 100 day supply (blood pressure, diabetes and cholesterol meds only)? Medication is not for cholesterol, blood pressure or diabetes

## 2023-05-02 DIAGNOSIS — N52.9 ED (ERECTILE DYSFUNCTION): ICD-10-CM

## 2023-05-04 RX ORDER — SILDENAFIL 100 MG/1
TABLET, FILM COATED ORAL
Qty: 90 TABLET | Refills: 0 | Status: SHIPPED | OUTPATIENT
Start: 2023-05-04 | End: 2023-09-28

## 2023-06-11 PROCEDURE — RXMED WILLOW AMBULATORY MEDICATION CHARGE: Performed by: FAMILY MEDICINE

## 2023-06-15 ENCOUNTER — PHARMACY VISIT (OUTPATIENT)
Dept: PHARMACY | Facility: MEDICAL CENTER | Age: 58
End: 2023-06-15
Payer: COMMERCIAL

## 2023-07-03 DIAGNOSIS — E11.65 UNCONTROLLED TYPE 2 DIABETES MELLITUS WITH HYPERGLYCEMIA (HCC): ICD-10-CM

## 2023-07-19 RX ORDER — ZOLPIDEM TARTRATE 5 MG/1
5-10 TABLET ORAL NIGHTLY PRN
Qty: 2 TABLET | Refills: 0 | Status: CANCELLED | OUTPATIENT
Start: 2023-07-19 | End: 2023-07-20

## 2023-07-19 NOTE — PROGRESS NOTES
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 he was last seen by Dr. Branch 11/17/2020 when his compliance was an adequate.  He was only using his device more than 4 hours 13% of the time.  The AHI had improved to 4.9 and there was no significant leak.  He felt no better using the    Sleep study was performed at home on 9/20/2020 and showed an MADDI of 38.1, a supine MADDI of 49.7, a evangelista saturation of 74%, and saturations below 89% for 92 minutes of the flow evaluation time.  He was on APAP 5-15 CWP.  His average pressures were 8.9-13.2 CWP.    He last used his machine in 2020.  On 2/14/2023 his hemoglobin was 20.2 and his hematocrit 57.8.      Past medical history significant for chronic kidney disease, dyslipidemia, polycythemia, diabetes, YOVANA, hypertension, gout, and never smoker.                  Patient Active Problem List    Diagnosis Date Noted    COVID-19 virus infection 11/30/2020    Acute hypoxemic respiratory failure (HCC) 11/30/2020    Acute renal failure superimposed on stage 3 chronic kidney disease (HCC) 05/24/2019    Vitamin D deficiency 05/24/2019    Gout 05/02/2019    Diabetes mellitus due to underlying condition, uncontrolled, with hyperglycemia (HCC) 05/02/2019    History of gout 04/01/2019    Weight gain 04/01/2019    Chronic fatigue 04/01/2019    Obesity (BMI 30-39.9) 05/04/2017    YOVANA on CPAP     HTN (hypertension)     ED (erectile dysfunction)        Past Medical History:   Diagnosis Date    Chickenpox     Diabetes (HCC)     ED (erectile dysfunction)     HTN (hypertension)     Obesity     YOVANA on CPAP     Sleep apnea         Past Surgical History:   Procedure Laterality Date    COLONOSCOPY WITH POLYP  03/28/2019    Polyp ascending colon, polyp transverse colon-3 adenomatous polyps, mild diverticulosis sigmoid colon    COLONOSCOPY WITH POLYP  2013    polyp - adenomatous- [facility]    ARTHROSCOPY,  KNEE      left knee     OTHER      repair of perforated nasal septum    SINUSCOPE         Family History   Problem Relation Age of Onset    Diabetes Father     Hyperlipidemia Father     Stroke Father     Sleep Apnea Neg Hx        Social History     Socioeconomic History    Marital status:      Spouse name: Not on file    Number of children: 0    Years of education: Not on file    Highest education level: Bachelor's degree (e.g., BA, AB, BS)   Occupational History    Occupation: controller - [employer]   Tobacco Use    Smoking status: Never    Smokeless tobacco: Never   Vaping Use    Vaping Use: Never used   Substance and Sexual Activity    Alcohol use: Yes     Alcohol/week: 0.0 oz     Comment: rare     Drug use: No    Sexual activity: Yes     Partners: Female   Other Topics Concern    Not on file   Social History Narrative    Not on file     Social Determinants of Health     Financial Resource Strain: Low Risk  (11/17/2021)    Overall Financial Resource Strain (CARDIA)     Difficulty of Paying Living Expenses: Not hard at all   Food Insecurity: No Food Insecurity (11/17/2021)    Hunger Vital Sign     Worried About Running Out of Food in the Last Year: Never true     Ran Out of Food in the Last Year: Never true   Transportation Needs: No Transportation Needs (11/17/2021)    PRAPARE - Transportation     Lack of Transportation (Medical): No     Lack of Transportation (Non-Medical): No   Physical Activity: Insufficiently Active (11/17/2021)    Exercise Vital Sign     Days of Exercise per Week: 3 days     Minutes of Exercise per Session: 40 min   Stress: No Stress Concern Present (11/17/2021)    [place] [employer] - Occupational Stress Questionnaire     Feeling of Stress : Only a little   Social Connections: Moderately Isolated (11/17/2021)    Social Connection and Isolation Panel [NHANES]     Frequency of Communication with Friends and Family: Twice a week     Frequency of Social  Gatherings with Friends and Family: Once a week     Attends Quaker Services: Never     Active Member of Clubs or Organizations: No     Attends Club or Organization Meetings: Never     Marital Status:    Intimate Partner Violence: Not on file   Housing Stability: Low Risk  (11/17/2021)    Housing Stability Vital Sign     Unable to Pay for Housing in the Last Year: No     Number of Places Lived in the Last Year: 1     Unstable Housing in the Last Year: No       Current Outpatient Medications   Medication Sig Dispense Refill    OZEMPIC, 2 MG/DOSE, 8 MG/3ML Solution Pen-injector INJECT 2 MG SUBCUTANEOUSLY EVERY WEEK AS DIRECTED      sildenafil citrate (VIAGRA) 100 MG tablet TAKE ONE TABLET BY MOUTH DAILY AS NEEDED for erectile dysfunction 90 Tablet 0    Empagliflozin 25 MG Tab Take one tablet by mouth every day. 90 Tablet 3    lisinopril (PRINIVIL) 10 MG Tab Take 1 Tablet by mouth every day. 90 Tablet 1    atorvastatin (LIPITOR) 20 MG Tab Take 1 Tablet by mouth at bedtime. 90 Tablet 1    Cyanocobalamin (VITAMIN B 12 PO) Take  by mouth.      aspirin EC (ECOTRIN) 81 MG Tablet Delayed Response Take 81 mg by mouth every morning.      colchicine (COLCRYS) 0.6 MG Tab TAKE 2 TABLETS BY MOUTH AT FIRST ONSET OF GOUT AND TAKE ANOTHER TABLET AFTER 1 HOUR 30 Tab 1    Blood Glucose Monitoring Suppl Device Meter: Dispense Device of Insurance Preference. Sig. Use as directed for blood sugar monitoring. #1. NR. 1 Device 0    Blood Glucose Test Strips Test strips order: Test strips for glucometer. Sig: use once daily before breakfast. 50 Strip 5    Blood Glucose Test Strips Test strips order: Test strips for glucometer. Sig: use  Once daily before breakfast. 50 Strip 5    Cholecalciferol (VITAMIN D) 2000 UNIT Tab Take 6,000 Units by mouth every morning. 3 tablets = 6000 units      Semaglutide, 1 MG/DOSE, (OZEMPIC, 1 MG/DOSE,) 4 MG/3ML Solution Pen-injector INJECT 1MG SUBCUTANEOUSLY ONCE WEEKLY (Patient not taking: Reported on  "7/24/2023) 18 mL 1     No current facility-administered medications for this visit.    \"CURRENT RX\"    ALLERGIES: Patient has no allergy information on record.    ROS    Constitutional: Denies fever, chills, sweats,  weight loss, fatigue.  Eyes: Denies vision loss, pain, drainage, double vision, glasses.  Ears/Nose/Mouth/Throat: Denies earache, difficulty hearing, rhinitis/nasal congestion, injury, recurrent sore throat, persistent hoarseness, decayed teeth/toothaches, ringing or buzzing in the ears.  Cardiovascular: Denies chest pain, tightness, palpitations, swelling in legs/feet, fainting, difficulty breathing when lying down but gets better when sitting up.   Respiratory: Denies shortness of breath, cough, sputum, wheezing, painful breathing, coughing up blood.   Sleep: per HPI  Gastrointestinal: Denies  difficulty swallowing, nausea, abdominal pain, diarrhea, constipation, heartburn.  Genitourinary: Denies  blood in urine, discharge, frequent urination.   Musculoskeletal: Denies painful joints, sore muscles, back pain.   Integumentary: Denies rashes, lumps, color changes.   Neurological: Denies frequent headaches,weakness, dizziness.    PHYSICAL EXAM      /80 (BP Location: Left arm, Patient Position: Sitting, BP Cuff Size: Large adult)   Pulse 98   Resp 16   Ht 1.829 m (6')   Wt 115 kg (253 lb)   SpO2 96%   BMI 34.31 kg/m²   Appearance: Well-nourished, well-developed, no acute distress  Eyes:  PERRLA, EOMI  ENMT: Mallampati  unchanged  Neck: Supple, trachea midline, no masses  Respiratory effort:  No intercostal retractions or use of accessory muscles  Lung auscultation:  No wheezes rhonchi rubs or rales  Cardiac: No murmurs, rubs, or gallops; regular rhythm, normal rate; no edema  Abdomen:  No tenderness, no organomegaly  Musculoskeletal:  Grossly normal; gait and station normal; digits and nails normal  Skin:  No rashes, petechiae, cyanosis  Neurologic: without focal signs; oriented to person, " time, place, and purpose; judgement intact  Psychiatric:  No depression, anxiety, agitation        Medical Decision Making:  The medical record was reviewed in its entirety including the referral notes, records from primary care, consultants notes, hospital records, labs, imaging, microbiology, immunology, and immunizations.  Care gaps identified and reviewed with the patient.    Diagnostic and titration nocturnal polysomnograms, home sleep apnea tests, continuous nocturnal oximetry results, multiple sleep latency tests, and compliance reports reviewed.        1. YOVANA (obstructive sleep apnea)    Other orders  - OZEMPIC, 2 MG/DOSE, 8 MG/3ML Solution Pen-injector; INJECT 2 MG SUBCUTANEOUSLY EVERY WEEK AS DIRECTED      PLAN:   Will order an attended study with as needed Ambien to facilitate sleep onset and maintenance.    The risks of untreated sleep apnea were discussed with the patient at length. Patients with YOVANA are at increased risk of cardiovascular disease including systemic arterial hypertension, pulmonary arterial hypertension (transient or fixed), TIA, and an elevated risk of stroke, heart attack, sudden death, or arrhythmia between the hours of 11 PM and 6 AM. YOVANA patients have an increased risk of motor vehicle accidents, type 2 diabetes, GERD, repetitive mechanical trauma to pharyngeal muscles with inflammation and denervation, frequent EEG arousals leading to nonrestorative sleep, excessive daytime sleepiness, fatigue, depression, poor pain control, irritability, and lower quality of life.  The patient was advised to avoid driving a motor vehicle when drowsy.    Positive airway pressure will favorably impact many of the adverse conditions and effects provoked by YOVANA.    Have advised the patient to follow up with the appropriate healthcare practitioners for all other medical problems and issues. Discussed blood pressure management if needed. Discussed depression screening.            No follow-ups on  file.    Total time 45 minutes

## 2023-07-24 ENCOUNTER — OFFICE VISIT (OUTPATIENT)
Dept: SLEEP MEDICINE | Facility: MEDICAL CENTER | Age: 58
End: 2023-07-24
Attending: FAMILY MEDICINE
Payer: COMMERCIAL

## 2023-07-24 VITALS
HEIGHT: 72 IN | RESPIRATION RATE: 16 BRPM | DIASTOLIC BLOOD PRESSURE: 80 MMHG | HEART RATE: 98 BPM | BODY MASS INDEX: 34.27 KG/M2 | OXYGEN SATURATION: 96 % | SYSTOLIC BLOOD PRESSURE: 120 MMHG | WEIGHT: 253 LBS

## 2023-07-24 DIAGNOSIS — G47.33 OSA (OBSTRUCTIVE SLEEP APNEA): ICD-10-CM

## 2023-07-24 PROCEDURE — 3074F SYST BP LT 130 MM HG: CPT | Performed by: INTERNAL MEDICINE

## 2023-07-24 PROCEDURE — 99212 OFFICE O/P EST SF 10 MIN: CPT | Performed by: INTERNAL MEDICINE

## 2023-07-24 PROCEDURE — 3079F DIAST BP 80-89 MM HG: CPT | Performed by: INTERNAL MEDICINE

## 2023-07-24 PROCEDURE — 99214 OFFICE O/P EST MOD 30 MIN: CPT | Performed by: INTERNAL MEDICINE

## 2023-07-24 RX ORDER — ZOLPIDEM TARTRATE 5 MG/1
5-10 TABLET ORAL NIGHTLY PRN
Qty: 2 TABLET | Refills: 0 | Status: SHIPPED | OUTPATIENT
Start: 2023-07-24 | End: 2023-07-25

## 2023-07-24 RX ORDER — SEMAGLUTIDE 2.68 MG/ML
INJECTION, SOLUTION SUBCUTANEOUS
COMMUNITY
Start: 2023-07-13 | End: 2023-10-03 | Stop reason: SDUPTHER

## 2023-07-24 ASSESSMENT — FIBROSIS 4 INDEX: FIB4 SCORE: 1.36

## 2023-07-25 ENCOUNTER — HOSPITAL ENCOUNTER (OUTPATIENT)
Dept: LAB | Facility: MEDICAL CENTER | Age: 58
End: 2023-07-25
Attending: INTERNAL MEDICINE
Payer: COMMERCIAL

## 2023-07-25 DIAGNOSIS — I10 ESSENTIAL HYPERTENSION: ICD-10-CM

## 2023-07-25 DIAGNOSIS — N18.30 STAGE 3 CHRONIC KIDNEY DISEASE, UNSPECIFIED WHETHER STAGE 3A OR 3B CKD: ICD-10-CM

## 2023-07-25 LAB
ANION GAP SERPL CALC-SCNC: 8 MMOL/L (ref 7–16)
BUN SERPL-MCNC: 36 MG/DL (ref 8–22)
CALCIUM SERPL-MCNC: 9.2 MG/DL (ref 8.5–10.5)
CHLORIDE SERPL-SCNC: 104 MMOL/L (ref 96–112)
CO2 SERPL-SCNC: 23 MMOL/L (ref 20–33)
CREAT SERPL-MCNC: 2.28 MG/DL (ref 0.5–1.4)
ERYTHROCYTE [DISTWIDTH] IN BLOOD BY AUTOMATED COUNT: 50.3 FL (ref 35.9–50)
GFR SERPLBLD CREATININE-BSD FMLA CKD-EPI: 32 ML/MIN/1.73 M 2
GLUCOSE SERPL-MCNC: 158 MG/DL (ref 65–99)
HCT VFR BLD AUTO: 57.8 % (ref 42–52)
HGB BLD-MCNC: 20 G/DL (ref 14–18)
MCH RBC QN AUTO: 32.8 PG (ref 27–33)
MCHC RBC AUTO-ENTMCNC: 34.6 G/DL (ref 32.3–36.5)
MCV RBC AUTO: 94.8 FL (ref 81.4–97.8)
PLATELET # BLD AUTO: 169 K/UL (ref 164–446)
PMV BLD AUTO: 10.1 FL (ref 9–12.9)
POTASSIUM SERPL-SCNC: 5 MMOL/L (ref 3.6–5.5)
RBC # BLD AUTO: 6.1 M/UL (ref 4.7–6.1)
SODIUM SERPL-SCNC: 135 MMOL/L (ref 135–145)
WBC # BLD AUTO: 6.8 K/UL (ref 4.8–10.8)

## 2023-07-25 PROCEDURE — 82043 UR ALBUMIN QUANTITATIVE: CPT

## 2023-07-25 PROCEDURE — 36415 COLL VENOUS BLD VENIPUNCTURE: CPT

## 2023-07-25 PROCEDURE — 85027 COMPLETE CBC AUTOMATED: CPT

## 2023-07-25 PROCEDURE — 80048 BASIC METABOLIC PNL TOTAL CA: CPT

## 2023-07-25 PROCEDURE — 82570 ASSAY OF URINE CREATININE: CPT

## 2023-07-26 ENCOUNTER — OFFICE VISIT (OUTPATIENT)
Dept: NEPHROLOGY | Facility: MEDICAL CENTER | Age: 58
End: 2023-07-26
Payer: COMMERCIAL

## 2023-07-26 VITALS
RESPIRATION RATE: 18 BRPM | BODY MASS INDEX: 34.27 KG/M2 | TEMPERATURE: 98 F | OXYGEN SATURATION: 96 % | WEIGHT: 253 LBS | HEART RATE: 111 BPM | SYSTOLIC BLOOD PRESSURE: 122 MMHG | DIASTOLIC BLOOD PRESSURE: 78 MMHG | HEIGHT: 72 IN

## 2023-07-26 DIAGNOSIS — N18.32 STAGE 3B CHRONIC KIDNEY DISEASE: ICD-10-CM

## 2023-07-26 DIAGNOSIS — G47.33 OSA ON CPAP: ICD-10-CM

## 2023-07-26 DIAGNOSIS — N18.32 TYPE 2 DIABETES MELLITUS WITH STAGE 3B CHRONIC KIDNEY DISEASE, UNSPECIFIED WHETHER LONG TERM INSULIN USE (HCC): ICD-10-CM

## 2023-07-26 DIAGNOSIS — E11.22 TYPE 2 DIABETES MELLITUS WITH STAGE 3B CHRONIC KIDNEY DISEASE, UNSPECIFIED WHETHER LONG TERM INSULIN USE (HCC): ICD-10-CM

## 2023-07-26 DIAGNOSIS — D75.1 ERYTHROCYTOSIS: ICD-10-CM

## 2023-07-26 DIAGNOSIS — I10 ESSENTIAL HYPERTENSION: ICD-10-CM

## 2023-07-26 LAB
CREAT UR-MCNC: 140.31 MG/DL
MICROALBUMIN UR-MCNC: 41.2 MG/DL
MICROALBUMIN/CREAT UR: 294 MG/G (ref 0–30)

## 2023-07-26 PROCEDURE — 99214 OFFICE O/P EST MOD 30 MIN: CPT | Performed by: INTERNAL MEDICINE

## 2023-07-26 PROCEDURE — 3074F SYST BP LT 130 MM HG: CPT | Performed by: INTERNAL MEDICINE

## 2023-07-26 PROCEDURE — 3078F DIAST BP <80 MM HG: CPT | Performed by: INTERNAL MEDICINE

## 2023-07-26 ASSESSMENT — ENCOUNTER SYMPTOMS
SHORTNESS OF BREATH: 0
COUGH: 0
CHILLS: 0
HYPERTENSION: 1
NAUSEA: 0
VOMITING: 0
FEVER: 0

## 2023-07-26 ASSESSMENT — FIBROSIS 4 INDEX: FIB4 SCORE: 1.45

## 2023-07-27 NOTE — PROGRESS NOTES
Subjective     Bijan Limon is a 57 y.o. male who presents with Hypertension and Chronic Kidney Disease            Hypertension  This is a chronic problem. The current episode started more than 1 year ago. The problem is unchanged. The problem is controlled. Pertinent negatives include no chest pain, malaise/fatigue, peripheral edema or shortness of breath. Risk factors for coronary artery disease include male gender and diabetes mellitus. Past treatments include ACE inhibitors. The current treatment provides significant improvement. There are no compliance problems.  Hypertensive end-organ damage includes kidney disease. Identifiable causes of hypertension include chronic renal disease.   Chronic Kidney Disease  This is a chronic problem. The current episode started more than 1 year ago. The problem occurs constantly. The problem has been unchanged. Pertinent negatives include no chest pain, chills, coughing, fever, nausea, urinary symptoms or vomiting.       Review of Systems   Constitutional:  Negative for chills, fever and malaise/fatigue.   Respiratory:  Negative for cough and shortness of breath.    Cardiovascular:  Negative for chest pain and leg swelling.   Gastrointestinal:  Negative for nausea and vomiting.   Genitourinary:  Negative for dysuria, frequency and urgency.              Objective     /78 (BP Location: Right arm, Patient Position: Sitting, BP Cuff Size: Adult)   Pulse (!) 111   Temp 36.7 °C (98 °F) (Temporal)   Resp 18   Ht 1.829 m (6')   Wt 115 kg (253 lb)   SpO2 96%   BMI 34.31 kg/m²      Physical Exam  Vitals and nursing note reviewed.   Constitutional:       General: He is not in acute distress.     Appearance: He is not ill-appearing.   HENT:      Head: Normocephalic and atraumatic.      Right Ear: External ear normal.      Left Ear: External ear normal.      Nose: Nose normal.   Eyes:      General:         Right eye: No discharge.         Left eye: No discharge.       Conjunctiva/sclera: Conjunctivae normal.   Cardiovascular:      Rate and Rhythm: Normal rate and regular rhythm.      Heart sounds: No murmur heard.  Pulmonary:      Effort: Pulmonary effort is normal. No respiratory distress.      Breath sounds: Normal breath sounds. No wheezing.   Musculoskeletal:         General: No tenderness or deformity.      Right lower leg: No edema.      Left lower leg: No edema.   Skin:     General: Skin is warm.   Neurological:      General: No focal deficit present.      Mental Status: He is alert and oriented to person, place, and time.   Psychiatric:         Mood and Affect: Mood normal.         Behavior: Behavior normal.       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[place]   Tobacco Use    Smoking status: Never    Smokeless tobacco: Never   Vaping Use    Vaping Use: Never used   Substance and Sexual Activity    Alcohol use: Yes     Alcohol/week: 0.0 oz     Comment: rare     Drug use: No    Sexual activity: Yes     Partners: Female   Other Topics Concern    Not on file   Social History Narrative    Not on file     Social Determinants of Health     Financial Resource Strain: Low Risk  (11/17/2021)    Overall Financial Resource Strain (CARDIA)     Difficulty of Paying Living Expenses: Not hard at all   Food Insecurity: No Food Insecurity (11/17/2021)    Hunger Vital Sign     Worried About Running Out of Food in the Last Year: Never true     Ran Out of Food in the Last Year: Never true   Transportation Needs: No Transportation Needs (11/17/2021)    PRAPARE - Transportation     Lack  of Transportation (Medical): No     Lack of Transportation (Non-Medical): No   Physical Activity: Insufficiently Active (11/17/2021)    Exercise Vital Sign     Days of Exercise per Week: 3 days     Minutes of Exercise per Session: 40 min   Stress: No Stress Concern Present (11/17/2021)    [place] [employer] - Occupational Stress Questionnaire     Feeling of Stress : Only a little   Social Connections: Moderately Isolated (11/17/2021)    Social Connection and Isolation Panel [NHANES]     Frequency of Communication with Friends and Family: Twice a week     Frequency of Social Gatherings with Friends and Family: Once a week     Attends Baptism Services: Never     Active Member of Clubs or Organizations: No     Attends Club or Organization Meetings: Never     Marital Status:    Intimate Partner Violence: Not on file   Housing Stability: Low Risk  (11/17/2021)    Housing Stability Vital Sign     Unable to Pay for Housing in the Last Year: No     Number of Places Lived in the Last Year: 1     Unstable Housing in the Last Year: No     Family History   Problem Relation Age of Onset    Diabetes Father     Hyperlipidemia Father     Stroke Father     Sleep Apnea Neg Hx      Recent Labs     02/14/23  1223 07/25/23  0719   SODIUM 136 135   POTASSIUM 4.3 5.0   CHLORIDE 100 104   CO2 23 23   BUN 32* 36*   CREATININE 2.07* 2.28*                             Assessment & Plan        1. Stage 3b chronic kidney disease (HCC)  Stable  No uremic symptoms  Renal dose of medication  Avoid nephrotoxins  Continue same medication regimen  Patient was advised to call us if symptoms worsen      2. Essential hypertension  Controlled  Continue same medication regimen  Continue low-sodium diet      3. Type 2 diabetes mellitus with stage 3 chronic kidney disease, unspecified whether long term insulin use, unspecified whether stage 3a or 3b CKD (HCC)  Continue to optimize diabetes control for hemoglobin C below  7%    4. YOVANA on CPAP  Patient was not using his CPAP machine  He will follow-up with his sleep specialist    5. Erythrocytosis  Patient is on bio T supplement, I recommend to the patient to discontinue the supplement as it might cause erythrocytosis  Patient discussed this with his primary care provider

## 2023-07-28 ENCOUNTER — HOSPITAL ENCOUNTER (OUTPATIENT)
Dept: LAB | Facility: MEDICAL CENTER | Age: 58
End: 2023-07-28
Attending: FAMILY MEDICINE
Payer: COMMERCIAL

## 2023-07-28 ENCOUNTER — OFFICE VISIT (OUTPATIENT)
Dept: MEDICAL GROUP | Facility: LAB | Age: 58
End: 2023-07-28
Payer: COMMERCIAL

## 2023-07-28 VITALS
BODY MASS INDEX: 33.72 KG/M2 | WEIGHT: 249 LBS | SYSTOLIC BLOOD PRESSURE: 110 MMHG | HEART RATE: 94 BPM | TEMPERATURE: 97 F | DIASTOLIC BLOOD PRESSURE: 70 MMHG | RESPIRATION RATE: 12 BRPM | OXYGEN SATURATION: 97 % | HEIGHT: 72 IN

## 2023-07-28 DIAGNOSIS — R71.8 ELEVATED HEMATOCRIT: ICD-10-CM

## 2023-07-28 DIAGNOSIS — N18.32 TYPE 2 DIABETES MELLITUS WITH STAGE 3B CHRONIC KIDNEY DISEASE, WITHOUT LONG-TERM CURRENT USE OF INSULIN (HCC): ICD-10-CM

## 2023-07-28 DIAGNOSIS — E78.5 DYSLIPIDEMIA: ICD-10-CM

## 2023-07-28 DIAGNOSIS — G47.33 OSA ON CPAP: ICD-10-CM

## 2023-07-28 DIAGNOSIS — I10 ESSENTIAL HYPERTENSION: ICD-10-CM

## 2023-07-28 DIAGNOSIS — E11.22 TYPE 2 DIABETES MELLITUS WITH STAGE 3B CHRONIC KIDNEY DISEASE, WITHOUT LONG-TERM CURRENT USE OF INSULIN (HCC): ICD-10-CM

## 2023-07-28 DIAGNOSIS — E11.65 UNCONTROLLED TYPE 2 DIABETES MELLITUS WITH HYPERGLYCEMIA (HCC): ICD-10-CM

## 2023-07-28 LAB
CHOLEST SERPL-MCNC: 126 MG/DL (ref 100–199)
HBA1C MFR BLD: 6.6 % (ref ?–5.8)
HDLC SERPL-MCNC: 40 MG/DL
LDLC SERPL CALC-MCNC: 67 MG/DL
POCT INT CON NEG: NEGATIVE
POCT INT CON POS: POSITIVE
TRIGL SERPL-MCNC: 96 MG/DL (ref 0–149)

## 2023-07-28 PROCEDURE — 80061 LIPID PANEL: CPT

## 2023-07-28 PROCEDURE — 99214 OFFICE O/P EST MOD 30 MIN: CPT | Performed by: FAMILY MEDICINE

## 2023-07-28 PROCEDURE — 83036 HEMOGLOBIN GLYCOSYLATED A1C: CPT | Performed by: FAMILY MEDICINE

## 2023-07-28 PROCEDURE — 3074F SYST BP LT 130 MM HG: CPT | Performed by: FAMILY MEDICINE

## 2023-07-28 PROCEDURE — 3078F DIAST BP <80 MM HG: CPT | Performed by: FAMILY MEDICINE

## 2023-07-28 PROCEDURE — 36415 COLL VENOUS BLD VENIPUNCTURE: CPT

## 2023-07-28 RX ORDER — LISINOPRIL 10 MG/1
10 TABLET ORAL DAILY
Qty: 90 TABLET | Refills: 1 | Status: SHIPPED | OUTPATIENT
Start: 2023-07-28 | End: 2023-12-21 | Stop reason: SDUPTHER

## 2023-07-28 RX ORDER — ATORVASTATIN CALCIUM 20 MG/1
20 TABLET, FILM COATED ORAL
Qty: 90 TABLET | Refills: 1 | Status: SHIPPED | OUTPATIENT
Start: 2023-07-28

## 2023-07-28 RX ORDER — PIOGLITAZONEHYDROCHLORIDE 15 MG/1
15 TABLET ORAL DAILY
Qty: 90 TABLET | Refills: 3 | Status: SHIPPED | OUTPATIENT
Start: 2023-07-28 | End: 2023-12-21 | Stop reason: SDUPTHER

## 2023-07-28 RX ORDER — PIOGLITAZONEHYDROCHLORIDE 15 MG/1
15 TABLET ORAL DAILY
COMMUNITY
End: 2023-07-28 | Stop reason: SDUPTHER

## 2023-07-28 ASSESSMENT — FIBROSIS 4 INDEX: FIB4 SCORE: 1.45

## 2023-07-28 NOTE — PROGRESS NOTES
Subjective:     Chief Complaint   Patient presents with    Follow-Up         HPI:   Pankaj presents today with follow up of uncontrolled diabetes. Last A1C in February 9.2%. Does see nephrology as well. Recent labs did not include an A1C.     Hgb/hct was high on recent labs as well.   Does get bio T from urology.   Last injection May. Jesse calvillo sleep study scheduled early August .     Feeling ok, more energy. Cutting back sugars, trying to get more exercise, golfing, pickle ball, walking.     A1C today 6.6%.     Not seeing derm. Not good about sun protection. Gets it on at least once.       Current Outpatient Medications Ordered in Epic   Medication Sig Dispense Refill    OZEMPIC, 2 MG/DOSE, 8 MG/3ML Solution Pen-injector INJECT 2 MG SUBCUTANEOUSLY EVERY WEEK AS DIRECTED      sildenafil citrate (VIAGRA) 100 MG tablet TAKE ONE TABLET BY MOUTH DAILY AS NEEDED for erectile dysfunction 90 Tablet 0    Semaglutide, 1 MG/DOSE, (OZEMPIC, 1 MG/DOSE,) 4 MG/3ML Solution Pen-injector INJECT 1MG SUBCUTANEOUSLY ONCE WEEKLY (Patient not taking: Reported on 7/24/2023) 18 mL 1    Empagliflozin 25 MG Tab Take one tablet by mouth every day. 90 Tablet 3    lisinopril (PRINIVIL) 10 MG Tab Take 1 Tablet by mouth every day. 90 Tablet 1    atorvastatin (LIPITOR) 20 MG Tab Take 1 Tablet by mouth at bedtime. 90 Tablet 1    Cyanocobalamin (VITAMIN B 12 PO) Take  by mouth.      aspirin EC (ECOTRIN) 81 MG Tablet Delayed Response Take 81 mg by mouth every morning.      colchicine (COLCRYS) 0.6 MG Tab TAKE 2 TABLETS BY MOUTH AT FIRST ONSET OF GOUT AND TAKE ANOTHER TABLET AFTER 1 HOUR 30 Tab 1    Blood Glucose Monitoring Suppl Device Meter: Dispense Device of Insurance Preference. Sig. Use as directed for blood sugar monitoring. #1. NR. 1 Device 0    Blood Glucose Test Strips Test strips order: Test strips for glucometer. Sig: use once daily before breakfast. 50 Strip 5    Blood Glucose Test Strips Test strips order: Test strips for glucometer.  Sig: use  Once daily before breakfast. 50 Strip 5    Cholecalciferol (VITAMIN D) 2000 UNIT Tab Take 6,000 Units by mouth every morning. 3 tablets = 6000 units       No current Epic-ordered facility-administered medications on file.         ROS:  Gen: no fevers/chills, no changes in weight  Eyes: no changes in vision  ENT: no sore throat, no hearing loss, no bloody nose  Pulm: no sob, no cough  CV: no chest pain, no palpitations  GI: no nausea/vomiting, no diarrhea  : no dysuria  MSk: no myalgias  Skin: no rash  Neuro: no headaches, no numbness/tingling  Heme/Lymph: no easy bruising      Objective:     Exam:  /70 (BP Location: Right arm, Patient Position: Sitting, BP Cuff Size: Adult)   Pulse 94   Temp 36.1 °C (97 °F)   Resp 12   Ht 1.829 m (6')   Wt 113 kg (249 lb)   SpO2 97%   BMI 33.77 kg/m²  Body mass index is 33.77 kg/m².    Gen: Alert and oriented, No apparent distress.  Neck: Neck is supple without lymphadenopathy.  Lungs: Normal effort, CTA bilaterally, no wheezes, rhonchi, or rales  CV: Regular rate and rhythm. No murmurs, rubs, or gallops.  Ext: No clubbing, cyanosis, edema.      Assessment & Plan:     57 y.o. male with the following -     1. Type 2 diabetes mellitus with stage 3b chronic kidney disease, without long-term current use of insulin ([facility])  Congratulated on his A1c today which is much improved from previously.  He will deftly continue seeing Decon as well as his current medications.  Refills are sent in.  He does need a cholesterol panel rechecked.  I am not quite sure why it did not get drawn previously but he will get that done at his convenience.  - POCT  A1C    2. YOVANA on CPAP  Following with sleep medicine.  Does have a sleep study scheduled for early August.    3. Essential hypertension  Well-controlled today.  Continue current meds.  Refill sent in.  - lisinopril (PRINIVIL) 10 MG Tab; Take 1 Tablet by mouth every day.  Dispense: 90 Tablet; Refill: 1    4. Dyslipidemia  Refill  sent in.  - atorvastatin (LIPITOR) 20 MG Tab; Take 1 Tablet by mouth at bedtime.  Dispense: 90 Tablet; Refill: 1    5.  Elevated hematocrit    Likely related to bio T pellet testosterone supplementations.  He is going to stop his next injection of this and continue managing his medications and see how this resolves.    No follow-ups on file.    Please note that this dictation was created using voice recognition software. I have made every reasonable attempt to correct obvious errors, but I expect that there are errors of grammar and possibly content that I did not discover before finalizing the note.

## 2023-08-10 ENCOUNTER — SLEEP STUDY (OUTPATIENT)
Dept: SLEEP MEDICINE | Facility: MEDICAL CENTER | Age: 58
End: 2023-08-10
Attending: INTERNAL MEDICINE
Payer: COMMERCIAL

## 2023-08-10 DIAGNOSIS — G47.33 OSA (OBSTRUCTIVE SLEEP APNEA): ICD-10-CM

## 2023-08-10 PROCEDURE — 95811 POLYSOM 6/>YRS CPAP 4/> PARM: CPT | Performed by: INTERNAL MEDICINE

## 2023-08-11 NOTE — PROCEDURES
MONTAGE: Standard  STUDY TYPE: Split Night  RECORDING TECHNIQUE:   After the scalp was prepared, gold plated electrodes were applied to the scalp according to the International 10-20 System. EEG (electroencephalogram) was continuously monitored from the O1-M2, O2-M1, C3-M2, C4-M1, F3-M2, and F4-M1. EOGs (electrooculograms) were monitored by electrodes placed at the left and right outer canthi. Chin EMG (electromyogram) was monitored by electrodes placed on the mentalis and sub-mentalis muscles. Nasal and oral airflow were monitored using a triple port thermocouple as well as oronasal pressure transducer. Respiratory effort was measured by inductive plethysmography technology employing abdominal and thoracic belts. Blood oxygen saturation and pulse were monitored by pulse oximetry. Heart rhythm was monitored by surface electrocardiogram. Leg EMG was studied using surface electrodes placed on left and right anterior tibialis. A microphone was used to monitor tracheal sounds and snoring. Body position was monitored and documented by technician observation.   SCORING CRITERIA:   A modification of the AASM manual for scoring of sleep and associated events was used. Obstructive apneas were scored by cessation of airflow for at least 10 seconds with continuing respiratory effort. Central apneas were scored by cessation of airflow for at least 10 seconds with no respiratory effort. Hypopneas were scored by a 30% or more reduction in airflow for at least 10 seconds accompanied by arterial oxygen desaturation of 3% or an arousal. For CMS (Medicare) patients, per AASM rule 1B, hypopneas are scored by 30% with mild reduction in airflow for at least 10 seconds accompanied by arterial saturation decreased at 4%.  DIAGNOSTIC  Study start time was 09:08:20 PM. Diagnostic recording time was 142 minutes with a total sleep time of 127 minutes resulting in a sleep efficiency of 89.44%%. Sleep latency from the start of the study was 06  minutes and the latency from sleep to REM was 72 minutes. In total,12 arousals were scored for an arousal index of 5.7.  Respiratory:  There were a total of 85 apneas consisting of 85 obstructive apneas, 0 mixed apneas, and 0 central apneas. A total of 48 hypopneas were scored. The apnea index was 40.16 per hour and the hypopnea index was 22.68 per hour resulting in an overall AHI of 62.83. AHI during REM was 73.3 and AHI while supine was 114.00.  Oximetry:  There was a mean oxygen saturation of 90.0%. The minimum oxygen saturation during NREM sleep was 83.0% and in REM was 73.0%. Time spent during sleep with oxygen saturations <88% was 28.4 minutes.   Cardiac:  The highest heart rate seen while awake was 103 BPM while the highest heart rate during sleep was 97 BPM with an average sleeping heart rate of 79 BPM.  Limb Movements:  There were a total of 5 PLMs during sleep, which resulted in a PLM index of 2.4. There were 2 PLMs associated with arousals which resulted in a PLMS arousal index of 0.9.  TREATMENT:  Treatment recording time was 6h 33.5m (393 minutes) with a total sleep time of 6h 14.5m (374 minutes) resulting in a sleep efficiency of 95.2%. Sleep latency from the start of treatment was 04 minutes and REM latency from sleep onset was 1h 9.0m. The patient had 25 arousals in total for an arousal index of 4.0.  Respiratory:   There were 4 apneas in total consisting of 2 obstructive apneas, 2 central apneas, and 0 mixed apneas for an apnea index of 0.64. The patient had 43 hypopneas in total, which resulted in a hypopnea index of 6.89. The overall AHI was 7.53, with a REM AHI of 11.58, and a supine AHI of 10.38.   Oximetry:  The mean SaO2 during treatment was 93.0%. The minimum oxygen saturation in NREM was 86.0 % and in REM was 77.0%. Patient spent 2.8 minutes of TST with SaO2 <88%.  Cardiac:  The highest heart rate during sleep was 94 BPM with an average sleeping heart rate of 78BPM.  Limb Movements:  There  were a total of 66 PLMS during titration sleep time that resulted in an index of 10.6. There were 8 PLMS associated with arousals. This resulted in a PLM arousal index of 1.3.  Titration: CPAP was tried from 6 cm H2O to 13 cm H2O. the patient did best with CPAP at 12 CWP which included 77 minutes of nonsupine REM sleep with a resultant AHI of 2.14, a evangelista saturation of 90%, and a mean saturation of 93%.  This was a fully attended sleep study. This test was technically adequate.  The patient used a medium Simplus mask and heated humidification.    ASSESSMENT:    Severe positional obstructive sleep apnea hypopnea - overall AHI 62.8, supine .0, mean event duration 19.7 seconds, longest event duration 42.5 seconds  Significant nocturnal desaturation - evangelista saturation 73% - saturations less than 88% for 8.3% of the TST  Successful CPAP titration to a best setting of CPAP at 12 CWP which included 77 minutes of nonsupine sleep and resulted in an AHI normalized of 2.14, a minimum saturation of 90%, and a mean saturation of 93%.            RECOMMENDATION:    Recommend CPAP 12 cmH2O using a medium Simplus mask and heated humidification followed by data card and clinical review 31-90 days after receiving equipment.              NOTES:     The AHI is the number of apneas (cessation of airflow for 10 seconds or longer) and hypopneas (shallow breaths accompanied by at least a 3% drop in the oxygen saturation) that occur per hour. Less than 5 per hour is normal, 5-15 per hour is mild sleep apnea, 15-30 per hour moderate sleep apnea, and greater than 30 per hour severe sleep apnea.    Patients with sleep apnea are at increased risk of cardiovascular disease including systemic arterial hypertension, pulmonary arterial hypertension, (transient or fixed), transient ischemic attacks, and an elevated risk of stroke, heart attack, sudden death, or arrhythmia between the hours of 11 PM and 6 AM.  Sleep apnea patients have an  increased risk of motor vehicle accidents, type 2 diabetes, gastroesophageal reflux, repetitive mechanical trauma to pharyngeal muscles with inflammation and denervation, frequent EEG arousals leading to nonrestorative sleep, excessive daytime somnolence, fatigue, depression, poor pain control, irritability, and a lower quality of life.                Dr. Saman Olson MD

## 2023-09-14 PROCEDURE — RXMED WILLOW AMBULATORY MEDICATION CHARGE: Performed by: FAMILY MEDICINE

## 2023-09-15 ENCOUNTER — PHARMACY VISIT (OUTPATIENT)
Dept: PHARMACY | Facility: MEDICAL CENTER | Age: 58
End: 2023-09-15
Payer: COMMERCIAL

## 2023-09-19 ENCOUNTER — OFFICE VISIT (OUTPATIENT)
Dept: SLEEP MEDICINE | Facility: MEDICAL CENTER | Age: 58
End: 2023-09-19
Attending: STUDENT IN AN ORGANIZED HEALTH CARE EDUCATION/TRAINING PROGRAM
Payer: COMMERCIAL

## 2023-09-19 VITALS
DIASTOLIC BLOOD PRESSURE: 70 MMHG | RESPIRATION RATE: 16 BRPM | OXYGEN SATURATION: 96 % | WEIGHT: 243 LBS | SYSTOLIC BLOOD PRESSURE: 120 MMHG | BODY MASS INDEX: 32.91 KG/M2 | HEIGHT: 72 IN | HEART RATE: 99 BPM

## 2023-09-19 DIAGNOSIS — G47.33 OSA (OBSTRUCTIVE SLEEP APNEA): Primary | ICD-10-CM

## 2023-09-19 PROCEDURE — 3078F DIAST BP <80 MM HG: CPT | Performed by: STUDENT IN AN ORGANIZED HEALTH CARE EDUCATION/TRAINING PROGRAM

## 2023-09-19 PROCEDURE — 99213 OFFICE O/P EST LOW 20 MIN: CPT | Performed by: STUDENT IN AN ORGANIZED HEALTH CARE EDUCATION/TRAINING PROGRAM

## 2023-09-19 PROCEDURE — 3074F SYST BP LT 130 MM HG: CPT | Performed by: STUDENT IN AN ORGANIZED HEALTH CARE EDUCATION/TRAINING PROGRAM

## 2023-09-19 PROCEDURE — 99212 OFFICE O/P EST SF 10 MIN: CPT | Performed by: STUDENT IN AN ORGANIZED HEALTH CARE EDUCATION/TRAINING PROGRAM

## 2023-09-19 ASSESSMENT — FIBROSIS 4 INDEX: FIB4 SCORE: 1.45

## 2023-09-19 NOTE — PROGRESS NOTES
[facility] Follow-up Visit    CC: Follow-up to discuss sleep study results      HPI:  STACEY KOWALSKI is a 57 y.o.male  with hypertension, hyperlipidemia, obesity, obstructive sleep apnea and polycythemia.  Presents today to discuss sleep study results.    He has a known history of obstructive sleep apnea and was on therapy up until 2020.  He recently underwent a PSG split-night study to reevaluate for obstructive sleep apnea.    He reports that his sleep study went well.  States that he did not have any difficulty using the CPAP machine.    He is concerned that untreated sleep apnea may be worsening his polycythemia.    Sleep History  8/10/2023 PSG split-night study showed severe obstructive sleep apnea with an overall AHI 62.8 events an hour, minimum oxygen saturation 73%, time at or below 88% saturation 28 minutes.  On therapy patient responded well to CPAP therapy.  Recommended CPAP 12 cmH2O    Patient Active Problem List    Diagnosis Date Noted    COVID-19 virus infection 11/30/2020    Acute hypoxemic respiratory failure (HCC) 11/30/2020    Acute renal failure superimposed on stage 3 chronic kidney disease (HCC) 05/24/2019    Vitamin D deficiency 05/24/2019    Gout 05/02/2019    Diabetes mellitus due to underlying condition, uncontrolled, with hyperglycemia (HCC) 05/02/2019    History of gout 04/01/2019    Weight gain 04/01/2019    Chronic fatigue 04/01/2019    Obesity (BMI 30-39.9) 05/04/2017    YOVANA on CPAP     HTN (hypertension)     ED (erectile dysfunction)        Past Medical History:   Diagnosis Date    Chickenpox     Diabetes (HCC)     ED (erectile dysfunction)     HTN (hypertension)     Obesity     YOVANA on CPAP     Sleep apnea         Past Surgical History:   Procedure Laterality Date    COLONOSCOPY WITH POLYP  03/28/2019    Polyp ascending colon, polyp transverse colon-3 adenomatous polyps, mild diverticulosis sigmoid colon    COLONOSCOPY WITH POLYP  2013    polyp - adenomatous- DHA     ARTHROSCOPY, KNEE      left knee     OTHER      repair of perforated nasal septum    SINUSCOPE         Family History   Problem Relation Age of Onset    Diabetes Father     Hyperlipidemia Father     Stroke Father     Sleep Apnea Neg Hx        Social History     Socioeconomic History    Marital status:      Spouse name: Not on file    Number of children: 0    Years of education: Not on file    Highest education level: Bachelor's degree (e.g., BA, AB, BS)   Occupational History    Occupation: controller - [employer]   Tobacco Use    Smoking status: Never    Smokeless tobacco: Never   Vaping Use    Vaping Use: Never used   Substance and Sexual Activity    Alcohol use: Yes     Alcohol/week: 0.0 oz     Comment: rare     Drug use: No    Sexual activity: Yes     Partners: Female   Other Topics Concern    Not on file   Social History Narrative    Not on file     Social Determinants of Health     Financial Resource Strain: Low Risk  (11/17/2021)    Overall Financial Resource Strain (CARDIA)     Difficulty of Paying Living Expenses: Not hard at all   Food Insecurity: No Food Insecurity (11/17/2021)    Hunger Vital Sign     Worried About Running Out of Food in the Last Year: Never true     Ran Out of Food in the Last Year: Never true   Transportation Needs: No Transportation Needs (11/17/2021)    PRAPARE - Transportation     Lack of Transportation (Medical): No     Lack of Transportation (Non-Medical): No   Physical Activity: Insufficiently Active (11/17/2021)    Exercise Vital Sign     Days of Exercise per Week: 3 days     Minutes of Exercise per Session: 40 min   Stress: No Stress Concern Present (11/17/2021)    [place] citizen [place] [employer] - Occupational Stress Questionnaire     Feeling of Stress : Only a little   Social Connections: Moderately Isolated (11/17/2021)    Social Connection and Isolation Panel [NHANES]     Frequency of Communication with Friends and Family: Twice a week     Frequency of  Social Gatherings with Friends and Family: Once a week     Attends Islam Services: Never     Active Member of Clubs or Organizations: No     Attends Club or Organization Meetings: Never     Marital Status:    Intimate Partner Violence: Not on file   Housing Stability: Low Risk  (11/17/2021)    Housing Stability Vital Sign     Unable to Pay for Housing in the Last Year: No     Number of Places Lived in the Last Year: 1     Unstable Housing in the Last Year: No       Current Outpatient Medications   Medication Sig Dispense Refill    lisinopril (PRINIVIL) 10 MG Tab Take 1 Tablet by mouth every day. 90 Tablet 1    pioglitazone (ACTOS) 15 MG Tab Take 1 Tablet by mouth every day. 90 Tablet 3    atorvastatin (LIPITOR) 20 MG Tab Take 1 Tablet by mouth at bedtime. 90 Tablet 1    OZEMPIC, 2 MG/DOSE, 8 MG/3ML Solution Pen-injector INJECT 2 MG SUBCUTANEOUSLY EVERY WEEK AS DIRECTED      sildenafil citrate (VIAGRA) 100 MG tablet TAKE ONE TABLET BY MOUTH DAILY AS NEEDED for erectile dysfunction 90 Tablet 0    Empagliflozin 25 MG Tab Take one tablet by mouth every day. 90 Tablet 3    Cyanocobalamin (VITAMIN B 12 PO) Take  by mouth.      aspirin EC (ECOTRIN) 81 MG Tablet Delayed Response Take 81 mg by mouth every morning.      colchicine (COLCRYS) 0.6 MG Tab TAKE 2 TABLETS BY MOUTH AT FIRST ONSET OF GOUT AND TAKE ANOTHER TABLET AFTER 1 HOUR 30 Tab 1    Blood Glucose Monitoring Suppl Device Meter: Dispense Device of Insurance Preference. Sig. Use as directed for blood sugar monitoring. #1. NR. 1 Device 0    Blood Glucose Test Strips Test strips order: Test strips for glucometer. Sig: use once daily before breakfast. 50 Strip 5    Blood Glucose Test Strips Test strips order: Test strips for glucometer. Sig: use  Once daily before breakfast. 50 Strip 5    Cholecalciferol (VITAMIN D) 2000 UNIT Tab Take 6,000 Units by mouth every morning. 3 tablets = 6000 units       No current facility-administered medications for this visit.         ALLERGIES: Patient has no allergy information on record.    ROS  Constitutional: Denies fevers, Denies weight changes  Ears/Nose/Throat/Mouth: Denies nasal congestion or sore throat   Cardiovascular: Denies chest pain  Respiratory: Denies shortness of breath, Denies cough  Gastrointestinal/Hepatic: Denies nausea, vomiting  Sleep: see HPI      PHYSICAL EXAM  /70 (BP Location: Left arm, Patient Position: Sitting, BP Cuff Size: Large adult)   Pulse 99   Resp 16   Ht 1.829 m (6')   Wt 110 kg (243 lb)   SpO2 96%   BMI 32.96 kg/m²   Appearance: Well-nourished, well-developed, no acute distress  Eyes:  No scleral icterus , EOMI  Musculoskeletal:  Grossly normal; gait and station normal; digits and nails normal  Skin:  No rashes, petechiae, cyanosis  Neurologic: without focal signs; oriented to person, time, place, and purpose; judgement intact      Medical Decision Making   Assessment and Plan  STACEY KWOALSKI is a 57 y.o.male  with hypertension, hyperlipidemia, obesity, obstructive sleep apnea and polycythemia.  Presents today to discuss sleep study results.    The medical record was reviewed.    Obstructive sleep apnea   Reviewed recent PSG split-night with patient showing an AHI of 62.8,  and Min Oxygen saturation of 73%.  Time at or below 88% saturation 28 minutes  Based on sleep study and symptoms meets criteria for Severe obstructive sleep apnea.   We discussed the pathophysiology of obstructive sleep apnea (YOVANA) and risk factors for the disease. We also discussed possible consequences of untreated YOVANA, including excessive daytime sleepiness and fatigue, cognitive dysfunction, cardiovascular complications such as elevated blood pressure, heart attacks, cardiac arrhythmias, and strokes. We discussed how YOVANA typically gets worse with age. We discussed treatment options for YOVANA, including the gold standard therapy (PAP).     We will proceed CPAP therapy.     RECOMMENDATIONS  -Start Auto CPAP at  pressures 12 cm H2O  -Discussed importance of adherence/compliance   -Prescription generated for supplies   -Patient counseled to avoid driving when sleepy. Encouraged to anticipate sleepiness, consider taking a 10 min nap prior to driving, alternate with another , or pull over if sleepy while driving  -Advised to contact our office or myself with any questions via MyChart  -Follow up in 3 months or sooner if needed      Positive airway pressure will favorably impact many of the adverse conditions and effects provoked by YOVANA.    Have advised the patient to follow up with the appropriate healthcare practitioners for all other medical problems and issues.    Return in about 3 months (around 12/19/2023).      Please note portions of this record was created using voice recognition software. I have made every reasonable attempt to correct obvious errors, but I expect that there are errors of grammar and possibly content I did not discover before finalizing the note.

## 2023-09-28 DIAGNOSIS — N52.9 ED (ERECTILE DYSFUNCTION): ICD-10-CM

## 2023-09-28 RX ORDER — SILDENAFIL 100 MG/1
TABLET, FILM COATED ORAL
Qty: 90 TABLET | Refills: 0 | Status: SHIPPED | OUTPATIENT
Start: 2023-09-28

## 2023-09-28 NOTE — TELEPHONE ENCOUNTER
Received request via: Pharmacy    Was the patient seen in the last year in this department? Yes  LOV 07/28/2023  Does the patient have an active prescription (recently filled or refills available) for medication(s) requested? No    Does the patient have halfway Plus and need 100 day supply (blood pressure, diabetes and cholesterol meds only)? Medication is not for cholesterol, blood pressure or diabetes and Patient does not have SCP

## 2023-10-03 RX ORDER — SEMAGLUTIDE 2.68 MG/ML
INJECTION, SOLUTION SUBCUTANEOUS
Qty: 9 ML | Refills: 3 | Status: SHIPPED | OUTPATIENT
Start: 2023-10-03

## 2023-10-03 NOTE — TELEPHONE ENCOUNTER
Received request via: Patient    Was the patient seen in the last year in this department? Yes  LOV : 7/28/2023   Does the patient have an active prescription (recently filled or refills available) for medication(s) requested? No    Does the patient have FPC Plus and need 100 day supply (blood pressure, diabetes and cholesterol meds only)? Patient does not have SCP

## 2023-10-19 ENCOUNTER — OFFICE VISIT (OUTPATIENT)
Dept: MEDICAL GROUP | Facility: LAB | Age: 58
End: 2023-10-19
Payer: COMMERCIAL

## 2023-10-19 VITALS
DIASTOLIC BLOOD PRESSURE: 70 MMHG | WEIGHT: 240 LBS | TEMPERATURE: 97 F | OXYGEN SATURATION: 93 % | RESPIRATION RATE: 12 BRPM | BODY MASS INDEX: 32.51 KG/M2 | HEART RATE: 96 BPM | HEIGHT: 72 IN | SYSTOLIC BLOOD PRESSURE: 110 MMHG

## 2023-10-19 DIAGNOSIS — E11.22 TYPE 2 DIABETES MELLITUS WITH STAGE 3B CHRONIC KIDNEY DISEASE, WITHOUT LONG-TERM CURRENT USE OF INSULIN (HCC): ICD-10-CM

## 2023-10-19 DIAGNOSIS — N18.32 TYPE 2 DIABETES MELLITUS WITH STAGE 3B CHRONIC KIDNEY DISEASE, WITHOUT LONG-TERM CURRENT USE OF INSULIN (HCC): ICD-10-CM

## 2023-10-19 DIAGNOSIS — H69.93 DYSFUNCTION OF BOTH EUSTACHIAN TUBES: ICD-10-CM

## 2023-10-19 DIAGNOSIS — E55.9 VITAMIN D DEFICIENCY: ICD-10-CM

## 2023-10-19 DIAGNOSIS — S76.011A STRAIN OF FLEXOR MUSCLE OF RIGHT HIP, INITIAL ENCOUNTER: ICD-10-CM

## 2023-10-19 DIAGNOSIS — N18.32 STAGE 3B CHRONIC KIDNEY DISEASE: ICD-10-CM

## 2023-10-19 DIAGNOSIS — R10.31 RIGHT GROIN PAIN: ICD-10-CM

## 2023-10-19 PROCEDURE — 99214 OFFICE O/P EST MOD 30 MIN: CPT | Performed by: FAMILY MEDICINE

## 2023-10-19 PROCEDURE — 3074F SYST BP LT 130 MM HG: CPT | Performed by: FAMILY MEDICINE

## 2023-10-19 PROCEDURE — 3078F DIAST BP <80 MM HG: CPT | Performed by: FAMILY MEDICINE

## 2023-10-19 RX ORDER — FEXOFENADINE HCL 180 MG/1
180 TABLET ORAL DAILY
Qty: 30 TABLET | Refills: 1 | Status: SHIPPED | OUTPATIENT
Start: 2023-10-19

## 2023-10-19 RX ORDER — FLUTICASONE PROPIONATE 50 MCG
1 SPRAY, SUSPENSION (ML) NASAL DAILY
Qty: 16 G | Refills: 1 | Status: SHIPPED | OUTPATIENT
Start: 2023-10-19 | End: 2023-12-05

## 2023-10-19 ASSESSMENT — FIBROSIS 4 INDEX: FIB4 SCORE: 1.48

## 2023-10-19 NOTE — PROGRESS NOTES
Subjective:     Chief Complaint   Patient presents with    Hearing Problem     Hip pain right x 2 months         HPI:   STACEY presents today with hearing concerns as well as hip pain.     2 weeks ago he got a cold and his ears have been plugged ever since. Feels like if he could pop them he could feel better. Feeling like hes under water. Pressure, some sharp pains if he blows his nose.     Hip:   Front of the right hip Stretched too far playing pickle ball. Feeling like he cant lift the leg up well. Getting socks on in the morning.         Current Outpatient Medications Ordered in Epic   Medication Sig Dispense Refill    sildenafil citrate (VIAGRA) 100 MG tablet Take 1 tablet by mouth once daily as needed for erectile dysfunction 90 Tablet 0    OZEMPIC, 2 MG/DOSE, 8 MG/3ML Solution Pen-injector INJECT 2 MG SUBCUTANEOUSLY EVERY WEEK AS DIRECTED 9 mL 3    lisinopril (PRINIVIL) 10 MG Tab Take 1 Tablet by mouth every day. 90 Tablet 1    pioglitazone (ACTOS) 15 MG Tab Take 1 Tablet by mouth every day. 90 Tablet 3    atorvastatin (LIPITOR) 20 MG Tab Take 1 Tablet by mouth at bedtime. 90 Tablet 1    Empagliflozin 25 MG Tab Take one tablet by mouth every day. 90 Tablet 3    Cyanocobalamin (VITAMIN B 12 PO) Take  by mouth.      aspirin EC (ECOTRIN) 81 MG Tablet Delayed Response Take 81 mg by mouth every morning.      colchicine (COLCRYS) 0.6 MG Tab TAKE 2 TABLETS BY MOUTH AT FIRST ONSET OF GOUT AND TAKE ANOTHER TABLET AFTER 1 HOUR 30 Tab 1    Blood Glucose Monitoring Suppl Device Meter: Dispense Device of Insurance Preference. Sig. Use as directed for blood sugar monitoring. #1. NR. 1 Device 0    Blood Glucose Test Strips Test strips order: Test strips for glucometer. Sig: use once daily before breakfast. 50 Strip 5    Blood Glucose Test Strips Test strips order: Test strips for glucometer. Sig: use  Once daily before breakfast. 50 Strip 5    Cholecalciferol (VITAMIN D) 2000 UNIT Tab Take 6,000 Units by mouth every  morning. 3 tablets = 6000 units       No current Epic-ordered facility-administered medications on file.         ROS:  Gen: no fevers/chills, no changes in weight  Eyes: no changes in vision  ENT: no sore throat, no bloody nose  Pulm: no sob, no cough  CV: no chest pain, no palpitations  GI: no nausea/vomiting, no diarrhea  : no dysuria  Skin: no rash  Neuro: no headaches, no numbness/tingling  Heme/Lymph: no easy bruising      Objective:     Exam:  /70 (BP Location: Right arm, Patient Position: Sitting, BP Cuff Size: Adult)   Pulse 96   Temp 36.1 °C (97 °F)   Resp 12   Ht 1.829 m (6')   Wt 109 kg (240 lb)   SpO2 93%   BMI 32.55 kg/m²  Body mass index is 32.55 kg/m².    Gen: Alert and oriented, No apparent distress.  HEENT: NCAT, EOMI, TMs are both retracted with some fluid posteriorly.  There is no bulging or erythema present.  No drainage noted.  Oropharynx is mildly erythematous without any exudate.  No tonsillar hypertrophy is noted.  Neck: Neck is supple without lymphadenopathy.  Lungs: Normal effort, CTA bilaterally, no wheezes, rhonchi, or rales  CV: Regular rate and rhythm. No murmurs, rubs, or gallops.  MSK: Right hip with tenderness on resisted hip flexion.  Supine straight leg raise is positive for pain and localizing to the anterior hip.  There is palpable tenderness as well at the hip flexor tendon origin .  Anterior hip pain with YANIQUE as well as FADIR.  There is no change with try to pin down the hip flexor tendon on exam.  Logroll is negative.  Otherwise good range of motion at the right hip.  Definitely weak on the right side compared to the left.      Assessment & Plan:     58 y.o. male with the following -     1. Right groin pain  We discussed given that he had a injury to this area that it is reasonable to get a plain film to assess the joint architecture underneath.  I do think this is probably soft tissue however given his mechanism of injury as well as a history of diabetes.  We  will go and get him referred to physical therapy to start working on this as well as getting some plain films.  - DX-HIP-COMPLETE - UNILATERAL 2+ RIGHT; Future  - Referral to Physical Therapy    2. Strain of flexor muscle of right hip, initial encounter    - Referral to Physical Therapy    3. Type 2 diabetes mellitus with stage 3b chronic kidney disease, without long-term current use of insulin (HCC)  Has been seen [facility].  He is due for labs.  He last A1c was under 7 which is really good for him.  Lab orders are placed for December  - Lipid Profile; Future  - MICROALBUMIN CREAT RATIO URINE; Future  - Comp Metabolic Panel; Future  - HEMOGLOBIN A1C; Future  - TSH WITH REFLEX TO FT4; Future    4. Stage 3b chronic kidney disease (HCC)  Following with nephrology.  They had at put some lab orders in in July but he had not had these done yet.  We will make sure that we cover these labs and what we ordered today.  - MICROALBUMIN CREAT RATIO URINE; Future    5. Vitamin D deficiency    - VITAMIN D,25 HYDROXY (DEFICIENCY); Future    6.  Eustachian tube dysfunction  Discussed trialing some oh Flonase nasal spray as well as antihistamines and Allegra.  These were both sent into the pharmacy today.  We did discuss the potential of adding a steroid orally but this is not my fair thing to do given that he is diabetic.  Let us know how things are going over the next week and we can make some decisions from there.            No follow-ups on file.    Please note that this dictation was created using voice recognition software. I have made every reasonable attempt to correct obvious errors, but I expect that there are errors of grammar and possibly content that I did not discover before finalizing the note.

## 2023-10-24 ENCOUNTER — APPOINTMENT (OUTPATIENT)
Dept: RADIOLOGY | Facility: MEDICAL CENTER | Age: 58
End: 2023-10-24
Attending: FAMILY MEDICINE
Payer: COMMERCIAL

## 2023-10-24 DIAGNOSIS — R10.31 RIGHT GROIN PAIN: ICD-10-CM

## 2023-10-24 PROCEDURE — 73502 X-RAY EXAM HIP UNI 2-3 VIEWS: CPT | Mod: RT

## 2023-11-22 ENCOUNTER — OFFICE VISIT (OUTPATIENT)
Dept: URGENT CARE | Facility: CLINIC | Age: 58
End: 2023-11-22
Payer: COMMERCIAL

## 2023-11-22 VITALS
TEMPERATURE: 96.9 F | SYSTOLIC BLOOD PRESSURE: 108 MMHG | BODY MASS INDEX: 32.1 KG/M2 | HEART RATE: 91 BPM | OXYGEN SATURATION: 98 % | RESPIRATION RATE: 16 BRPM | WEIGHT: 237 LBS | HEIGHT: 72 IN | DIASTOLIC BLOOD PRESSURE: 70 MMHG

## 2023-11-22 DIAGNOSIS — R21 RASH AND NONSPECIFIC SKIN ERUPTION: ICD-10-CM

## 2023-11-22 PROCEDURE — 3078F DIAST BP <80 MM HG: CPT | Performed by: PHYSICIAN ASSISTANT

## 2023-11-22 PROCEDURE — 3074F SYST BP LT 130 MM HG: CPT | Performed by: PHYSICIAN ASSISTANT

## 2023-11-22 PROCEDURE — 99213 OFFICE O/P EST LOW 20 MIN: CPT | Performed by: PHYSICIAN ASSISTANT

## 2023-11-22 RX ORDER — METHYLPREDNISOLONE 4 MG/1
TABLET ORAL
Qty: 21 TABLET | Refills: 0 | Status: SHIPPED | OUTPATIENT
Start: 2023-11-22 | End: 2023-12-05

## 2023-11-22 ASSESSMENT — FIBROSIS 4 INDEX: FIB4 SCORE: 1.48

## 2023-11-22 NOTE — PROGRESS NOTES
Subjective:   STACEY KOWALSKI is a 58 y.o. male who presents for Rash (X 2 week, all over body, legs, arms, stomach, back, itchiness, painful, redness)     This is a pleasant 58-year-old male who presents with onset of whole body 2 weeks ago, started on leg, moved to whole body. Very pruritic.  No new meds, went to [place] and stayed in hotel, rash didn't start for another few days.  No new detergents soaps or lotions.  No f/c/m.  Has not seen any bedbugs or bites.            Review of Systems   Skin:  Positive for rash.       Medications:  aspirin EC Tbec  atorvastatin Tabs  Blood Glucose Monitoring Suppl Ayleen  Blood Glucose Test Strips  colchicine Tabs  Empagliflozin Tabs  fexofenadine  fluticasone  lisinopril Tabs  Ozempic (2 MG/DOSE) Sopn  pioglitazone Tabs  sildenafil citrate  VITAMIN B 12 PO  vitamin D Tabs    Allergies:             Patient has no known allergies.    Surgical History:         Past Surgical History:   Procedure Laterality Date    COLONOSCOPY WITH POLYP  03/28/2019    Polyp ascending colon, polyp transverse colon-3 adenomatous polyps, mild diverticulosis sigmoid colon    COLONOSCOPY WITH POLYP  2013    polyp - adenomatous- [facility]    ARTHROSCOPY, KNEE      left knee     OTHER      repair of perforated nasal septum    SINUSCOPE         Past Social Hx:  STACEY KOWALSKI  reports that he has never smoked. He has never used smokeless tobacco. He reports current alcohol use. He reports that he does not use drugs.     Past Family Hx:   STACEY KOWALSKI family history includes Diabetes in his father; Hyperlipidemia in his father; Stroke in his father.       Problem list, medications, and allergies reviewed by myself today in Epic.     Objective:     /70 (BP Location: Left arm, Patient Position: Sitting, BP Cuff Size: Large adult)   Pulse 91   Temp 36.1 °C (96.9 °F)   Resp 16   Ht 1.829 m (6')   Wt 108 kg (237 lb)   SpO2 98%   BMI 32.14 kg/m²     Physical  Exam  Vitals and nursing note reviewed.   Constitutional:       General: He is not in acute distress.     Appearance: He is well-developed. He is not ill-appearing, toxic-appearing or diaphoretic.      Comments: This is a nontoxic-appearing child in no apparent distress   HENT:      Head: Normocephalic.      Nose: Nose normal.      Mouth/Throat:      Mouth: Mucous membranes are moist.   Eyes:      Extraocular Movements: Extraocular movements intact.      Conjunctiva/sclera: Conjunctivae normal.      Pupils: Pupils are equal, round, and reactive to light.   Cardiovascular:      Rate and Rhythm: Normal rate and regular rhythm.      Heart sounds: Normal heart sounds.   Pulmonary:      Effort: Pulmonary effort is normal. No accessory muscle usage or respiratory distress.      Breath sounds: Normal breath sounds. No decreased breath sounds, wheezing, rhonchi or rales.   Musculoskeletal:         General: Normal range of motion.      Cervical back: Normal range of motion and neck supple. No rigidity.   Lymphadenopathy:      Cervical: No cervical adenopathy.   Skin:     General: Skin is warm and dry.      Findings: Erythema and rash present.      Comments: Diffuse erythematous mildly papular rash noted over trunk abdomen and torso bilateral upper and lower extremities.  No significant facial involvement.  No hand involvement.  Nonlinear.  Nonvesicular.  No central clearing.  Evidence of excoriation.   Neurological:      Mental Status: He is alert and oriented to person, place, and time.   Psychiatric:         Behavior: Behavior is cooperative.         Assessment/Plan:     Diagnosis and Associated Orders:     1. Rash and nonspecific skin eruption  - methylPREDNISolone (MEDROL DOSEPAK) 4 MG Tablet Therapy Pack; Follow schedule on package instructions.  Dispense: 21 Tablet; Refill: 0        Comments/MDM:  Suspect contact dermatitis with recent travel.  Lesions do not appear consistent with infectious etiology or fungal  etiology.  Given whole body involvement I recommend oral steroids.  Recommend hypoallergenic soaps lotions and detergents.  Denies new medications soaps lotions or detergents.  No outside contact.  No new antibiotics.  Monitor symptoms closely and follow-up with primary care or here in the urgent care if symptoms worsen.  I personally reviewed prior external notes and test results pertinent to today's visit. Supportive care, natural history, differential diagnoses, and indications for immediate follow-up discussed. Return to clinic or go to ED if symptoms worsen or persist.  Red flag symptoms discussed.  Patient/Parent/Guardian voices understanding. Follow-up with your primary care provider in 3-5 days.  All side effects of medication discussed including allergic response, GI upset, tendon injury, rash, sedation etc    Please note that this dictation was created using voice recognition software. I have made a reasonable attempt to correct obvious errors, but I expect that there are errors of grammar and possibly content that I did not discover before finalizing the note.    This note was electronically signed by Chandrika Gold PA-C

## 2023-12-01 ENCOUNTER — HOSPITAL ENCOUNTER (OUTPATIENT)
Dept: LAB | Facility: MEDICAL CENTER | Age: 58
End: 2023-12-01
Attending: FAMILY MEDICINE
Payer: COMMERCIAL

## 2023-12-01 ENCOUNTER — HOSPITAL ENCOUNTER (OUTPATIENT)
Dept: LAB | Facility: MEDICAL CENTER | Age: 58
End: 2023-12-01
Attending: INTERNAL MEDICINE
Payer: COMMERCIAL

## 2023-12-01 DIAGNOSIS — N18.32 STAGE 3B CHRONIC KIDNEY DISEASE: ICD-10-CM

## 2023-12-01 DIAGNOSIS — I10 ESSENTIAL HYPERTENSION: ICD-10-CM

## 2023-12-01 DIAGNOSIS — N18.32 TYPE 2 DIABETES MELLITUS WITH STAGE 3B CHRONIC KIDNEY DISEASE, UNSPECIFIED WHETHER LONG TERM INSULIN USE (HCC): ICD-10-CM

## 2023-12-01 DIAGNOSIS — E11.22 TYPE 2 DIABETES MELLITUS WITH STAGE 3B CHRONIC KIDNEY DISEASE, UNSPECIFIED WHETHER LONG TERM INSULIN USE (HCC): ICD-10-CM

## 2023-12-01 DIAGNOSIS — N18.32 TYPE 2 DIABETES MELLITUS WITH STAGE 3B CHRONIC KIDNEY DISEASE, WITHOUT LONG-TERM CURRENT USE OF INSULIN (HCC): ICD-10-CM

## 2023-12-01 DIAGNOSIS — E11.22 TYPE 2 DIABETES MELLITUS WITH STAGE 3B CHRONIC KIDNEY DISEASE, WITHOUT LONG-TERM CURRENT USE OF INSULIN (HCC): ICD-10-CM

## 2023-12-01 DIAGNOSIS — E55.9 VITAMIN D DEFICIENCY: ICD-10-CM

## 2023-12-01 LAB
25(OH)D3 SERPL-MCNC: 68 NG/ML (ref 30–100)
ALBUMIN SERPL BCP-MCNC: 4.1 G/DL (ref 3.2–4.9)
ALBUMIN/GLOB SERPL: 1.6 G/DL
ALP SERPL-CCNC: 72 U/L (ref 30–99)
ALT SERPL-CCNC: 18 U/L (ref 2–50)
ANION GAP SERPL CALC-SCNC: 9 MMOL/L (ref 7–16)
AST SERPL-CCNC: 16 U/L (ref 12–45)
BILIRUB SERPL-MCNC: 0.7 MG/DL (ref 0.1–1.5)
BUN SERPL-MCNC: 52 MG/DL (ref 8–22)
CALCIUM ALBUM COR SERPL-MCNC: 8.9 MG/DL (ref 8.5–10.5)
CALCIUM SERPL-MCNC: 9 MG/DL (ref 8.5–10.5)
CHLORIDE SERPL-SCNC: 107 MMOL/L (ref 96–112)
CHOLEST SERPL-MCNC: 127 MG/DL (ref 100–199)
CO2 SERPL-SCNC: 23 MMOL/L (ref 20–33)
CREAT SERPL-MCNC: 2.57 MG/DL (ref 0.5–1.4)
CREAT UR-MCNC: 101.47 MG/DL
ERYTHROCYTE [DISTWIDTH] IN BLOOD BY AUTOMATED COUNT: 47.8 FL (ref 35.9–50)
EST. AVERAGE GLUCOSE BLD GHB EST-MCNC: 137 MG/DL
FASTING STATUS PATIENT QL REPORTED: NORMAL
GFR SERPLBLD CREATININE-BSD FMLA CKD-EPI: 28 ML/MIN/1.73 M 2
GLOBULIN SER CALC-MCNC: 2.5 G/DL (ref 1.9–3.5)
GLUCOSE SERPL-MCNC: 150 MG/DL (ref 65–99)
HBA1C MFR BLD: 6.4 % (ref 4–5.6)
HCT VFR BLD AUTO: 47.4 % (ref 42–52)
HDLC SERPL-MCNC: 53 MG/DL
HGB BLD-MCNC: 16.4 G/DL (ref 14–18)
LDLC SERPL CALC-MCNC: 52 MG/DL
MCH RBC QN AUTO: 33.1 PG (ref 27–33)
MCHC RBC AUTO-ENTMCNC: 34.6 G/DL (ref 32.3–36.5)
MCV RBC AUTO: 95.6 FL (ref 81.4–97.8)
MICROALBUMIN UR-MCNC: 6.6 MG/DL
MICROALBUMIN/CREAT UR: 65 MG/G (ref 0–30)
PLATELET # BLD AUTO: 169 K/UL (ref 164–446)
PMV BLD AUTO: 9.9 FL (ref 9–12.9)
POTASSIUM SERPL-SCNC: 5 MMOL/L (ref 3.6–5.5)
PROT SERPL-MCNC: 6.6 G/DL (ref 6–8.2)
RBC # BLD AUTO: 4.96 M/UL (ref 4.7–6.1)
SODIUM SERPL-SCNC: 139 MMOL/L (ref 135–145)
TRIGL SERPL-MCNC: 108 MG/DL (ref 0–149)
TSH SERPL DL<=0.005 MIU/L-ACNC: 1.98 UIU/ML (ref 0.38–5.33)
WBC # BLD AUTO: 7.5 K/UL (ref 4.8–10.8)

## 2023-12-01 PROCEDURE — 84443 ASSAY THYROID STIM HORMONE: CPT

## 2023-12-01 PROCEDURE — 80053 COMPREHEN METABOLIC PANEL: CPT

## 2023-12-01 PROCEDURE — 36415 COLL VENOUS BLD VENIPUNCTURE: CPT

## 2023-12-01 PROCEDURE — 82570 ASSAY OF URINE CREATININE: CPT

## 2023-12-01 PROCEDURE — 83036 HEMOGLOBIN GLYCOSYLATED A1C: CPT

## 2023-12-01 PROCEDURE — 85027 COMPLETE CBC AUTOMATED: CPT

## 2023-12-01 PROCEDURE — 82306 VITAMIN D 25 HYDROXY: CPT

## 2023-12-01 PROCEDURE — 82043 UR ALBUMIN QUANTITATIVE: CPT

## 2023-12-01 PROCEDURE — 80061 LIPID PANEL: CPT

## 2023-12-05 ENCOUNTER — OFFICE VISIT (OUTPATIENT)
Dept: MEDICAL GROUP | Facility: LAB | Age: 58
End: 2023-12-05
Payer: COMMERCIAL

## 2023-12-05 ENCOUNTER — HOSPITAL ENCOUNTER (OUTPATIENT)
Facility: MEDICAL CENTER | Age: 58
End: 2023-12-05
Attending: FAMILY MEDICINE
Payer: COMMERCIAL

## 2023-12-05 VITALS
SYSTOLIC BLOOD PRESSURE: 110 MMHG | HEART RATE: 95 BPM | RESPIRATION RATE: 12 BRPM | DIASTOLIC BLOOD PRESSURE: 72 MMHG | OXYGEN SATURATION: 98 % | TEMPERATURE: 95.8 F | BODY MASS INDEX: 32.07 KG/M2 | WEIGHT: 236.8 LBS | HEIGHT: 72 IN

## 2023-12-05 DIAGNOSIS — L50.9 URTICARIAL DERMATITIS: ICD-10-CM

## 2023-12-05 DIAGNOSIS — M10.9 GOUT, ARTHRITIS: ICD-10-CM

## 2023-12-05 LAB — PATHOLOGY CONSULT NOTE: NORMAL

## 2023-12-05 PROCEDURE — 99213 OFFICE O/P EST LOW 20 MIN: CPT | Mod: 25 | Performed by: FAMILY MEDICINE

## 2023-12-05 PROCEDURE — 3078F DIAST BP <80 MM HG: CPT | Performed by: FAMILY MEDICINE

## 2023-12-05 PROCEDURE — 88305 TISSUE EXAM BY PATHOLOGIST: CPT

## 2023-12-05 PROCEDURE — 3074F SYST BP LT 130 MM HG: CPT | Performed by: FAMILY MEDICINE

## 2023-12-05 PROCEDURE — 88312 SPECIAL STAINS GROUP 1: CPT

## 2023-12-05 PROCEDURE — 11104 PUNCH BX SKIN SINGLE LESION: CPT | Performed by: FAMILY MEDICINE

## 2023-12-05 RX ORDER — AMOXICILLIN AND CLAVULANATE POTASSIUM 875; 125 MG/1; MG/1
1 TABLET, FILM COATED ORAL 2 TIMES DAILY
Qty: 20 TABLET | Refills: 0 | Status: SHIPPED | OUTPATIENT
Start: 2023-12-05 | End: 2023-12-15

## 2023-12-05 RX ORDER — COLCHICINE 0.6 MG/1
TABLET ORAL
Qty: 30 TABLET | Refills: 1 | Status: SHIPPED | OUTPATIENT
Start: 2023-12-05

## 2023-12-05 RX ORDER — PREDNISONE 20 MG/1
TABLET ORAL
Qty: 18 TABLET | Refills: 0 | Status: SHIPPED | OUTPATIENT
Start: 2023-12-05 | End: 2023-12-21

## 2023-12-05 ASSESSMENT — FIBROSIS 4 INDEX: FIB4 SCORE: 1.34

## 2023-12-05 NOTE — PROGRESS NOTES
Subjective:     Chief Complaint   Patient presents with    Rash     3-4 weeks, spread from calves to arms, itching         HPI:   STACEY presents today with a very itchy rash that has been present from 3 to 4 weeks.  Started after he got back from a trip to [place].  He did stay in a resort while there but did not have any symptoms while in that resort.  He reports this started on his lower legs, on the shins, and is progressively spread to the upper thighs front and back of the trunk as well as arms.  He does have some excoriation marks and some bleeding due to scratching.  He was seen in urgent care and started on a Medrol Dosepak but he reports this did not make things go away.  It did improve while he was on the steroid but as soon as he came off things got much worse and spread.  Denies any fever or recent cough cold or upper respiratory infection.  Has used some topical cortisone cream over-the-counter with some benefit.  No changes in topicals or lotions or soaps or medications.        Current Outpatient Medications Ordered in Epic   Medication Sig Dispense Refill    fexofenadine (ALLEGRA) 180 MG tablet Take 1 Tablet by mouth every day. 30 Tablet 1    OZEMPIC, 2 MG/DOSE, 8 MG/3ML Solution Pen-injector INJECT 2 MG SUBCUTANEOUSLY EVERY WEEK AS DIRECTED 9 mL 3    sildenafil citrate (VIAGRA) 100 MG tablet Take 1 tablet by mouth once daily as needed for erectile dysfunction 90 Tablet 0    lisinopril (PRINIVIL) 10 MG Tab Take 1 Tablet by mouth every day. 90 Tablet 1    pioglitazone (ACTOS) 15 MG Tab Take 1 Tablet by mouth every day. 90 Tablet 3    atorvastatin (LIPITOR) 20 MG Tab Take 1 Tablet by mouth at bedtime. 90 Tablet 1    Empagliflozin 25 MG Tab Take one tablet by mouth every day. 90 Tablet 3    Cyanocobalamin (VITAMIN B 12 PO) Take  by mouth.      aspirin EC (ECOTRIN) 81 MG Tablet Delayed Response Take 81 mg by mouth every morning.      colchicine (COLCRYS) 0.6 MG Tab TAKE 2 TABLETS BY MOUTH AT FIRST ONSET  OF GOUT AND TAKE ANOTHER TABLET AFTER 1 HOUR 30 Tab 1    Blood Glucose Monitoring Suppl Device Meter: Dispense Device of Insurance Preference. Sig. Use as directed for blood sugar monitoring. #1. NR. 1 Device 0    Blood Glucose Test Strips Test strips order: Test strips for glucometer. Sig: use once daily before breakfast. 50 Strip 5    Blood Glucose Test Strips Test strips order: Test strips for glucometer. Sig: use  Once daily before breakfast. 50 Strip 5    Cholecalciferol (VITAMIN D) 2000 UNIT Tab Take 6,000 Units by mouth every morning. 3 tablets = 6000 units      methylPREDNISolone (MEDROL DOSEPAK) 4 MG Tablet Therapy Pack Follow schedule on package instructions. (Patient not taking: Reported on 12/5/2023) 21 Tablet 0    fluticasone (FLONASE) 50 MCG/ACT nasal spray Administer 1 Spray into affected nostril(S) every day. (Patient not taking: Reported on 12/5/2023) 16 g 1     No current Epic-ordered facility-administered medications on file.         ROS:  Gen: no fevers/chills, no changes in weight  Eyes: no changes in vision  ENT: no sore throat, no hearing loss, no bloody nose  Pulm: no sob, no cough  CV: no chest pain, no palpitations  GI: no nausea/vomiting, no diarrhea  : no dysuria  MSk: no myalgias  Skin: no rash  Neuro: no headaches, no numbness/tingling  Heme/Lymph: no easy bruising      Objective:     Exam:  /72 (BP Location: Left arm, Patient Position: Sitting, BP Cuff Size: Adult)   Pulse 95   Temp (!) 35.4 °C (95.8 °F) (Temporal)   Resp 12   Ht 1.829 m (6')   Wt 107 kg (236 lb 12.8 oz)   SpO2 98%   BMI 32.12 kg/m²  Body mass index is 32.12 kg/m².    Gen: AAOx3, NAD, well appearing  HEENT: NCAT, EOMI, Nares patent, Mucosa moist  Resp: Normal chest wall rise and fall, not SOB, no tachypnea  Skin: Diffuse erythematous patchy rash which does frank at the lower legs as well as the trunk front and back and both arms diffusely.  There is some dermatitis and dryness present as well as  excoriation and scabbing.  Psych: normal speech, not slurred, good insight, affect full  MSK: Moves all four limbs equally and normally, gait normal        Assessment & Plan:     58 y.o. male with the following -     1. Gout, arthritis  Chronic, stable.  Refill sent in.  - colchicine (COLCRYS) 0.6 MG Tab; TAKE 2 TABLETS BY MOUTH AT FIRST ONSET OF GOUT AND TAKE ANOTHER TABLET AFTER 1 HOUR  Dispense: 30 Tablet; Refill: 1    2. Urticarial dermatitis  We did discuss biopsy of the skin to see if we can get a better idea of what is the source of this for better treatment.  In the interim we will go ahead and use a longer course and heavier dose of a steroid as well as an oral antibiotic to cover for any superficial bacterial infection.  See procedure note for biopsy.  - predniSONE (DELTASONE) 20 MG Tab; 3 tabd daily for 3 days, 2 tabs daily for 3 days, 1 tab daily for 3 days.  Dispense: 18 Tablet; Refill: 0  - amoxicillin-clavulanate (AUGMENTIN) 875-125 MG Tab; Take 1 Tablet by mouth 2 times a day for 10 days.  Dispense: 20 Tablet; Refill: 0  - Pathology Specimen; Future        No follow-ups on file.    Please note that this dictation was created using voice recognition software. I have made every reasonable attempt to correct obvious errors, but I expect that there are errors of grammar and possibly content that I did not discover before finalizing the note.

## 2023-12-05 NOTE — PROCEDURES
PUNCH BIOPSY PROCEDURE NOTE:    Indication: Urticarial rash    Location of lesion to be biopsied: Left lower leg    The benefits, alternatives and risks (including bleeding and infection) of the procedure and written informed consent obtained.    Local anesthesia was performed with Lidocaine 1% with epinephrine (total 1 cc). Patient is prepped with chlorprep and draped in the usual sterile fashion.  A 3 mm punch was used to biopsy the area.  The wound was closed with pressure bandage.  Discussed leaving the bandage in place for at least the next 24 hours.  After that he can clean and bathe as usual.  The specimen was sent for pathologic examination.  Blood loss minuscule.    The patient tolerated the procedure well and without apparent complications.

## 2023-12-06 NOTE — OP THERAPY EVALUATION
"  Outpatient Physical Therapy  INITIAL EVALUATION    [facility] Therapy  [address]  [place] [address]  Phone:  [phone]  Fax:  [phone]    Date of Evaluation: 2023    Patient: Bijan KOWALSKI  YOB: 1965  MRN: [id number]     Referring Provider: Carly Cavanaugh M.D.  [address] [facility]  [address]  [place] [address]   Referring Diagnosis Right groin pain [R10.31];Strain of flexor muscle of right hip, initial encounter [S76.011A]     Time Calculation  Start time: 1530  Stop time: 1620 Time Calculation (min): 50 minutes         Chief Complaint: Hip Problem (R HF strain/OA)    Visit Diagnoses     ICD-10-CM   1. Right groin pain  R10.31   2. Strain of flexor muscle of right hip, initial encounter  S76.011A       Date of onset of impairment: No data found    Subjective:   History of Present Illness:     Mechanism of injury:  Pt is a 58 y.o male presenting to physical therapy with complaints of R groin pain. Pt has a PMH of HTN, obesity, gout, DM with uncontrolled hyperglycemia, and acute renal failure with stage 3 CKD. Pt reports that he plays a lot of pickle ball and notices hip R hip pain the most while playing. Pt describes pain as a bartolome sense. Pt notes that he feels a twinge when he stands and has to rest a little before moving. Notices pain the worst with higher level activities.     Per MD on 10/19/2023 :\"Front of the right hip Stretched too far playing pickle ball. Feeling like he cant lift the leg up well. Getting socks on in the morning.\"    Pt denies changes in bowel/bladder movements, saddle anesthesia, significant weight changes, chills/night sweats/fever, nausea and vomiting. Pt consents to evaluation and treatment today.    Sleep disturbance:  Not disrupted  Pain:     Current pain ratin    At best pain ratin    At worst pain ratin    Location:  R hip    Pain Comments::  Quality: stiffness, " twinge    Aggravating Factors: pickle ball (cutting side to side and propelling fwd), hiking, going down hill or down stairs (due to diabetes), biking uphill    Relieving Factors:    Activities of Daily Living:     Patient reported ADL status: Pt reported ADLs: IND  Work:     Exercise: bike, elliptical, TM at the gym (20-30 minutes)  Hobbies: pickle ball (2 hours 3 days a week), golf, hiking     Patient Goals:     Patient goals for therapy:  Increased motion, decreased pain, increased strength and return to sport/leisure activities    Other patient goals:  Increased flexibility      Past Medical History:   Diagnosis Date    Chickenpox     Diabetes (HCC)     ED (erectile dysfunction)     HTN (hypertension)     Obesity     YOVANA on CPAP     Sleep apnea      Past Surgical History:   Procedure Laterality Date    COLONOSCOPY WITH POLYP  03/28/2019    Polyp ascending colon, polyp transverse colon-3 adenomatous polyps, mild diverticulosis sigmoid colon    COLONOSCOPY WITH POLYP  2013    polyp - adenomatous- [facility]    ARTHROSCOPY, KNEE      left knee     OTHER      repair of perforated nasal septum    SINUSCOPE       Social History     Tobacco Use    Smoking status: Never    Smokeless tobacco: Never   Substance Use Topics    Alcohol use: Yes     Alcohol/week: 0.0 oz     Comment: rare      Family and Occupational History     Socioeconomic History    Marital status:      Spouse name: Not on file    Number of children: 0    Years of education: Not on file    Highest education level: Bachelor's degree (e.g., BA, AB, BS)   Occupational History    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    Active Range of Motion   Left Hip   Normal active range of motion    Right Hip   Normal active range of motion    Passive Range of Motion   Left Hip   Flexion: WFL  External rotation (90/90): WFL  Internal rotation (90/90): 15 degrees with pain    Right  "Hip   Flexion: WFL  External rotation (90/90): WFL  Internal rotation (90/90): 20 degrees     Strength:      Left Hip   Planes of Motion   Flexion: 4+  Extension: 4+  Abduction: 3+  Adduction: 4    Right Hip   Planes of Motion   Flexion: 4+  Extension: 4+  Abduction: 3 (pt screams out in pain)  Adduction: 4    Additional Strength Details  Bridge: pt able to obtain 2/3rd ROM with no increase in R hip pain  SL bridge: pt pelvic height drops bilaterally 1/3 ROM    Tests     Left Hip   Varinder: Hip flexion: 0. Knee flexion: 90.     Right Hip   Positive YANIQUE, FADIR and scour.   Varinder: Positive. Hip flexion: 10. Knee flexion: 90.     General Comments     Hip Comments   Gait: pt ambulates with good gait mechanics, slight ER at R tibia, otherwise good gait speed, equal in stride length, and no s/s of antalgic gait pattern        Therapeutic Exercises (CPT 98195):     1. pt education, Exam Findings: hip OA, POC    2. mod varinder stretch, x30\"    3. standing HF stretch, x30\"    4. ASLR, x10    5. clamshell, x10      Therapeutic Exercise Summary: Access Code: [email or web address]  URL: [email or web address]  Date: 12/07/2023  Prepared by: Lynne Turner    Exercises  - Modified Varinder Stretch  - 1 x daily - 7 x weekly - 3 sets - 30 seconds hold  - Standing Hip Flexor Stretch  - 1 x daily - 7 x weekly - 3 sets - 10 reps  - Small Range Straight Leg Raise  - 1 x daily - 4 x weekly - 3 sets - 10 reps  - Clamshell  - 1 x daily - 4 x weekly - 3 sets - 10 reps    Patient Education  - Hip Osteoarthritis      Time-based treatments/modalities:    Physical Therapy Timed Treatment Charges  Therapeutic exercise minutes (CPT 18916): 15 minutes      Assessment, Response and Plan:   Impairments: abnormal or restricted ROM, activity intolerance, impaired physical strength, lacks appropriate home exercise program and pain with function    Assessment details:  Pt is a 58 y.o male presenting to physical therapy with complaints of R groin pain. Pt has a " PMH of HTN, obesity, gout, DM with uncontrolled hyperglycemia, and acute renal failure with stage 3 CKD. Pt presents with R hip pain consistent with chronic hip flexor strain. Per pt imaging impression of BL hips, pt has moderate to severe OA of R hip, mild to moderate OA of L hip. Pt presents with the following impairments: decreased AROM at R hip with IR, (+) Varinder test, decreased activity tolerance and functional mobility, morning stiffness and initial stiffness when getting up from chair, weakness in BL hip extension and hip abduction. Pt has remained very active and overall is not limited currently with any ADLs or recreational activities.     Pt educated on exam findings and future POC, pt acknowledged understanding and is agreeable. Pt will benefit from skilled physical therapy to address the following impairments in order to improve functional mobility and overall QOL.  Pt should progress well towards goals if compliant with HEP and POC.     Barriers to therapy:  Comorbidities and financial  Other barriers to therapy:  Pt with high deductible and obtaining new insurance not taken by [facility] starting Jan 1  Prognosis: fair    Goals:   Short Term Goals:   1. Pt will report independence and compliance with written HEP   2. Pt will maintain level of hip flexion and ER rotation to WFL in order to don shoes/socks with ease in sitting/standing  3. Pt will demo (-) varinder test in order to normalize HF length and elasticity.  Short term goal time span:  2-4 weeks      Long Term Goals:    1. Pt will report independence and compliance with written HEP   2. Pt will demo improved BLE strength to >/= to 4+/5 in order to support high level of activity and recreational sport  3. Pt will report an overall reduction of s/s to no more than 2/10 after play pickle ball or hiking for an extended period of time in order to maintain current level of participation.   4. Pt will have a significant improvement in WOMAC with MDC of >/=  to 3.94 points (eval: 17.71)   Long term goal time span:  6-8 weeks    Plan:   Therapy options:  Physical therapy treatment to continue  Planned therapy interventions:  E Stim Unattended (CPT 65234), Neuromuscular Re-education (CPT 84515), Therapeutic Activities (CPT 58206) and Therapeutic Exercise (CPT 70972)  Frequency:  1x week  Duration in weeks:  8  Discussed with:  Patient  Plan details:  UPOC: 02/02/2024      Functional Assessment Used  WOMAC Grand Total: 17.71     Referring provider co-signature:  I have reviewed this plan of care and my co-signature certifies the need for services.    Certification Period: 12/07/2023 to  02/02/2024    Physician Signature: ________________________________ Date: ______________

## 2023-12-07 ENCOUNTER — PHYSICAL THERAPY (OUTPATIENT)
Dept: PHYSICAL THERAPY | Facility: MEDICAL CENTER | Age: 58
End: 2023-12-07
Attending: FAMILY MEDICINE
Payer: COMMERCIAL

## 2023-12-07 DIAGNOSIS — R10.31 RIGHT GROIN PAIN: ICD-10-CM

## 2023-12-07 DIAGNOSIS — S76.011A STRAIN OF FLEXOR MUSCLE OF RIGHT HIP, INITIAL ENCOUNTER: ICD-10-CM

## 2023-12-07 PROCEDURE — 97162 PT EVAL MOD COMPLEX 30 MIN: CPT

## 2023-12-07 PROCEDURE — 97110 THERAPEUTIC EXERCISES: CPT

## 2023-12-07 ASSESSMENT — ENCOUNTER SYMPTOMS
PAIN SCALE AT LOWEST: 0
PAIN SCALE AT HIGHEST: 4
PAIN LOCATION: R HIP
PAIN SCALE: 0

## 2023-12-08 ENCOUNTER — TELEPHONE (OUTPATIENT)
Dept: SLEEP MEDICINE | Facility: MEDICAL CENTER | Age: 58
End: 2023-12-08
Payer: COMMERCIAL

## 2023-12-08 NOTE — TELEPHONE ENCOUNTER
12-08-23  1st NO SHOW  Date of No Show: 12/7/23  Provider: Dr. Vee  Reason For Visit: 3M FV  Outcome of call:  no answer LVM.  Left call back for scheduling [phone].

## 2023-12-14 ENCOUNTER — PHYSICAL THERAPY (OUTPATIENT)
Dept: PHYSICAL THERAPY | Facility: MEDICAL CENTER | Age: 58
End: 2023-12-14
Attending: FAMILY MEDICINE
Payer: COMMERCIAL

## 2023-12-14 DIAGNOSIS — R10.31 RIGHT GROIN PAIN: ICD-10-CM

## 2023-12-14 DIAGNOSIS — S76.011A STRAIN OF FLEXOR MUSCLE OF RIGHT HIP, INITIAL ENCOUNTER: ICD-10-CM

## 2023-12-14 PROCEDURE — 97110 THERAPEUTIC EXERCISES: CPT

## 2023-12-14 NOTE — OP THERAPY DAILY TREATMENT
"  Outpatient Physical Therapy  DAILY TREATMENT     [facility] Outpatient Physical Therapy  [address]  [place] [address]  Phone:  [phone]  Fax:  [phone]    Date: 12/14/2023    Patient: Bijan KOWALSKI  YOB: 1965  MRN: [id number]     Time Calculation    Start time: 1445  Stop time: 1530 Time Calculation (min): 45 minutes         Chief Complaint: Hip Problem    Visit #: 2    SUBJECTIVE:  Pt states that he is very busy and has to cancel the rest of the day.     OBJECTIVE:  Current objective measures:     Passive Range of Motion   Left Hip   Flexion: WFL  External rotation (90/90): WFL  Internal rotation (90/90): 15 degrees with pain     Right Hip   Flexion: WFL  External rotation (90/90): WFL  Internal rotation (90/90): 20 degrees      Strength:       Left Hip   Planes of Motion   Flexion: 4+  Extension: 4+  Abduction: 3+  Adduction: 4     Right Hip   Planes of Motion   Flexion: 4+  Extension: 4+  Abduction: 3 (pt screams out in pain)  Adduction: 4     Additional Strength Details  Bridge: pt able to obtain 2/3rd ROM with no increase in R hip pain  SL bridge: pt pelvic height drops bilaterally 1/3 ROM     Tests      Left Hip   Varinder: Hip flexion: 0. Knee flexion: 90.      Right Hip   Positive YANIQUE, FADIR and scour.   Varinder: Positive. Hip flexion: 10. Knee flexion: 90.      General Comments      Hip Comments   Gait: pt ambulates with good gait mechanics, slight ER at R tibia, otherwise good gait speed, equal in stride length, and no s/s of antalgic gait pattern      Therapeutic Exercises (CPT 58385):     1. Recumbent bike, x4 minutes, cardiovascular warm up    2. mod varinder stretch, 2x30\"    3. standing HF stretch, x30\"    4. ASLR, 2x10    5. clamshell, 2x 10    6. bridge, 2x 10 GTB    7. figure 4 bridge, 3x 30\"    8. hip IR stretch, 3 x30\"    9. standing quad stretch, 3 x30\"      Therapeutic Exercise Summary: Access Code: [email or web address]  URL: " [email or web address]  Date: 12/07/2023  Prepared by: Lynne Turner    Exercises  - Modified Varinder Stretch  - 1 x daily - 7 x weekly - 3 sets - 30 seconds hold  - Standing Hip Flexor Stretch  - 1 x daily - 7 x weekly - 3 sets - 10 reps  - Small Range Straight Leg Raise  - 1 x daily - 4 x weekly - 3 sets - 10 reps  - Clamshell  - 1 x daily - 4 x weekly - 3 sets - 10 reps    Patient Education  - Hip Osteoarthritis      Time-based treatments/modalities:    Physical Therapy Timed Treatment Charges  Therapeutic exercise minutes (CPT 43389): 37 minutes      ASSESSMENT:   Response to treatment: Pt with fair tolerance to today's treatment session. Pt with busy schedule and high payment so decided today may be his last visit. Pt with good no increase in s/s with any additional there-x today. Provided options for HF stretching both supine and standing as well as glute and hip flexor strengthening. Pt informed that his referral will stay open for 30 days and then he will be d/c.     PLAN/RECOMMENDATIONS:   Plan for treatment: therapy treatment to continue next visit.  Planned interventions for next visit: continue with current treatment.

## 2023-12-19 ENCOUNTER — OFFICE VISIT (OUTPATIENT)
Dept: NEPHROLOGY | Facility: MEDICAL CENTER | Age: 58
End: 2023-12-19
Payer: COMMERCIAL

## 2023-12-19 VITALS
HEART RATE: 108 BPM | BODY MASS INDEX: 32.64 KG/M2 | WEIGHT: 241 LBS | DIASTOLIC BLOOD PRESSURE: 66 MMHG | TEMPERATURE: 98.4 F | HEIGHT: 72 IN | OXYGEN SATURATION: 94 % | SYSTOLIC BLOOD PRESSURE: 112 MMHG

## 2023-12-19 DIAGNOSIS — N18.32 STAGE 3B CHRONIC KIDNEY DISEASE: ICD-10-CM

## 2023-12-19 DIAGNOSIS — I10 ESSENTIAL HYPERTENSION: ICD-10-CM

## 2023-12-19 DIAGNOSIS — E11.22 TYPE 2 DIABETES MELLITUS WITH STAGE 3B CHRONIC KIDNEY DISEASE, WITHOUT LONG-TERM CURRENT USE OF INSULIN (HCC): ICD-10-CM

## 2023-12-19 DIAGNOSIS — N18.32 TYPE 2 DIABETES MELLITUS WITH STAGE 3B CHRONIC KIDNEY DISEASE, WITHOUT LONG-TERM CURRENT USE OF INSULIN (HCC): ICD-10-CM

## 2023-12-19 PROCEDURE — 3078F DIAST BP <80 MM HG: CPT | Performed by: INTERNAL MEDICINE

## 2023-12-19 PROCEDURE — 99214 OFFICE O/P EST MOD 30 MIN: CPT | Performed by: INTERNAL MEDICINE

## 2023-12-19 PROCEDURE — 3074F SYST BP LT 130 MM HG: CPT | Performed by: INTERNAL MEDICINE

## 2023-12-19 ASSESSMENT — ENCOUNTER SYMPTOMS
HYPERTENSION: 1
SHORTNESS OF BREATH: 0
CHILLS: 0
FEVER: 0
VOMITING: 0
NAUSEA: 0
COUGH: 0

## 2023-12-19 ASSESSMENT — FIBROSIS 4 INDEX: FIB4 SCORE: 1.34

## 2023-12-19 NOTE — PROGRESS NOTES
Subjective     Bijan KOWALSKI is a 58 y.o. male who presents with Chronic Kidney Disease and Hypertension            Chronic Kidney Disease  This is a chronic problem. The current episode started more than 1 year ago. The problem occurs constantly. The problem has been waxing and waning. Pertinent negatives include no chest pain, chills, coughing, fever, nausea, urinary symptoms or vomiting.   Hypertension  This is a chronic problem. The current episode started more than 1 year ago. The problem is unchanged. The problem is controlled. Pertinent negatives include no chest pain, malaise/fatigue, peripheral edema or shortness of breath. Risk factors for coronary artery disease include male gender and diabetes mellitus. Past treatments include ACE inhibitors. The current treatment provides significant improvement. There are no compliance problems.  Hypertensive end-organ damage includes kidney disease. Identifiable causes of hypertension include chronic renal disease.       Review of Systems   Constitutional:  Negative for chills, fever and malaise/fatigue.   Respiratory:  Negative for cough and shortness of breath.    Cardiovascular:  Negative for chest pain and leg swelling.   Gastrointestinal:  Negative for nausea and vomiting.   Genitourinary:  Negative for dysuria, frequency and urgency.              Objective     /66 (BP Location: Right arm, Patient Position: Sitting)   Pulse (!) 108   Temp 36.9 °C (98.4 °F) (Temporal)   Ht 1.829 m (6')   Wt 109 kg (241 lb)   SpO2 94%   BMI 32.69 kg/m²      Physical Exam  Vitals and nursing note reviewed.   Constitutional:       General: He is not in acute distress.     Appearance: He is not ill-appearing.   HENT:      Head: Normocephalic and atraumatic.      Right Ear: External ear normal.      Left Ear: External ear normal.      Nose: Nose normal.   Eyes:      General:         Right eye: No discharge.         Left eye: No discharge.      Conjunctiva/sclera:  Conjunctivae normal.   Cardiovascular:      Rate and Rhythm: Normal rate and regular rhythm.      Heart sounds: No murmur heard.  Pulmonary:      Effort: Pulmonary effort is normal. No respiratory distress.      Breath sounds: Normal breath sounds. No wheezing.   Musculoskeletal:         General: No tenderness or deformity.      Right lower leg: No edema.      Left lower leg: No edema.   Skin:     General: Skin is warm.   Neurological:      General: No focal deficit present.      Mental Status: He is alert and oriented to person, place, and time.   Psychiatric:         Mood and Affect: Mood normal.         Behavior: Behavior normal.       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[place]   Tobacco Use    Smoking status: Never    Smokeless tobacco: Never   Vaping Use    Vaping Use: Never used   Substance and Sexual Activity    Alcohol use: Yes     Alcohol/week: 0.0 oz     Comment: rare     Drug use: No    Sexual activity: Yes     Partners: Female   Other Topics Concern    Not on file   Social History Narrative    Not on file     Social Determinants of Health     Financial Resource Strain: Low Risk  (11/17/2021)    Overall Financial Resource Strain (CARDIA)     Difficulty of Paying Living Expenses: Not hard at all   Food Insecurity: No Food Insecurity (11/17/2021)    Hunger Vital Sign     Worried About Running Out of Food in the Last Year: Never true     Ran Out of Food in the Last Year: Never true   Transportation Needs: No Transportation Needs (11/17/2021)    PRAPARE - Transportation     Lack of Transportation  (Medical): No     Lack of Transportation (Non-Medical): No   Physical Activity: Insufficiently Active (11/17/2021)    Exercise Vital Sign     Days of Exercise per Week: 3 days     Minutes of Exercise per Session: 40 min   Stress: No Stress Concern Present (11/17/2021)    [employer] [place] [employer] - Occupational Stress Questionnaire     Feeling of Stress : Only a little   Social Connections: Moderately Isolated (11/17/2021)    Social Connection and Isolation Panel [NHANES]     Frequency of Communication with Friends and Family: Twice a week     Frequency of Social Gatherings with Friends and Family: Once a week     Attends Religion Services: Never     Active Member of Clubs or Organizations: No     Attends Club or Organization Meetings: Never     Marital Status:    Intimate Partner Violence: Not on file   Housing Stability: Low Risk  (11/17/2021)    Housing Stability Vital Sign     Unable to Pay for Housing in the Last Year: No     Number of Places Lived in the Last Year: 1     Unstable Housing in the Last Year: No     Family History   Problem Relation Age of Onset    Diabetes Father     Hyperlipidemia Father     Stroke Father     Sleep Apnea Neg Hx      Recent Labs     02/14/23  1223 07/25/23  0719 07/28/23  0814 12/01/23  0713   ALBUMIN  --   --   --  4.1   HDL  --   --  40 53   TRIGLYCERIDE  --   --  96 108   SODIUM 136 135  --  139   POTASSIUM 4.3 5.0  --  5.0   CHLORIDE 100 104  --  107   CO2 23 23 --  23   BUN 32* 36*  --  52*   CREATININE 2.07* 2.28*  --  2.57*                             Assessment & Plan        1. Essential hypertension  Blood pressure is on the low side, patient has occasional orthostatic symptoms  I advised the patient to watch his symptoms carefully, if it happens more often then we will discontinue lisinopril  Continue low-sodium diet    2. Stage 3b chronic kidney disease (HCC)  Creatinine slightly worse, most likely secondary to low blood pressure and weight  loss  No uremic symptoms  Renal dose of medication  Avoid nephrotoxins  Continue same medication regimen  Patient was advised to call us if symptoms worsen  Recheck labs    3. Type 2 diabetes mellitus with stage 3 chronic kidney disease, unspecified whether long term insulin use, unspecified whether stage 3a or 3b CKD (HCC)  Continue to optimize diabetes control

## 2023-12-21 ENCOUNTER — OFFICE VISIT (OUTPATIENT)
Dept: MEDICAL GROUP | Facility: LAB | Age: 58
End: 2023-12-21
Payer: COMMERCIAL

## 2023-12-21 ENCOUNTER — APPOINTMENT (OUTPATIENT)
Dept: PHYSICAL THERAPY | Facility: MEDICAL CENTER | Age: 58
End: 2023-12-21
Attending: FAMILY MEDICINE
Payer: COMMERCIAL

## 2023-12-21 VITALS
OXYGEN SATURATION: 97 % | HEIGHT: 72 IN | RESPIRATION RATE: 12 BRPM | HEART RATE: 83 BPM | TEMPERATURE: 98 F | BODY MASS INDEX: 33.05 KG/M2 | SYSTOLIC BLOOD PRESSURE: 110 MMHG | WEIGHT: 244 LBS | DIASTOLIC BLOOD PRESSURE: 70 MMHG

## 2023-12-21 DIAGNOSIS — R10.31 RIGHT GROIN PAIN: ICD-10-CM

## 2023-12-21 DIAGNOSIS — I10 ESSENTIAL HYPERTENSION: ICD-10-CM

## 2023-12-21 DIAGNOSIS — L30.8 SPONGIOTIC DERMATITIS: ICD-10-CM

## 2023-12-21 DIAGNOSIS — Z23 NEED FOR VACCINATION: ICD-10-CM

## 2023-12-21 DIAGNOSIS — S76.011A STRAIN OF FLEXOR MUSCLE OF RIGHT HIP, INITIAL ENCOUNTER: ICD-10-CM

## 2023-12-21 PROCEDURE — 90471 IMMUNIZATION ADMIN: CPT | Performed by: FAMILY MEDICINE

## 2023-12-21 PROCEDURE — 3074F SYST BP LT 130 MM HG: CPT | Performed by: FAMILY MEDICINE

## 2023-12-21 PROCEDURE — 90677 PCV20 VACCINE IM: CPT | Performed by: FAMILY MEDICINE

## 2023-12-21 PROCEDURE — 99214 OFFICE O/P EST MOD 30 MIN: CPT | Mod: 25 | Performed by: FAMILY MEDICINE

## 2023-12-21 PROCEDURE — 3078F DIAST BP <80 MM HG: CPT | Performed by: FAMILY MEDICINE

## 2023-12-21 RX ORDER — HYDROXYZINE HYDROCHLORIDE 25 MG/1
25 TABLET, FILM COATED ORAL 3 TIMES DAILY PRN
Qty: 45 TABLET | Refills: 0 | Status: SHIPPED | OUTPATIENT
Start: 2023-12-21 | End: 2024-01-05

## 2023-12-21 RX ORDER — LISINOPRIL 5 MG/1
5 TABLET ORAL DAILY
Qty: 90 TABLET | Refills: 3 | Status: SHIPPED | OUTPATIENT
Start: 2023-12-21 | End: 2023-12-21

## 2023-12-21 RX ORDER — LISINOPRIL 5 MG/1
5 TABLET ORAL DAILY
Qty: 90 TABLET | Refills: 3 | Status: SHIPPED | OUTPATIENT
Start: 2023-12-21

## 2023-12-21 RX ORDER — PIOGLITAZONEHYDROCHLORIDE 15 MG/1
15 TABLET ORAL DAILY
Qty: 90 TABLET | Refills: 3 | Status: SHIPPED | OUTPATIENT
Start: 2023-12-21

## 2023-12-21 ASSESSMENT — FIBROSIS 4 INDEX: FIB4 SCORE: 1.34

## 2023-12-21 NOTE — PROGRESS NOTES
Subjective:     Chief Complaint   Patient presents with    Follow-Up     Itching all over          HPI:   STACEY presents today with follow up on itchy rash, biopsy showed spongiotic dermatitis.   Prednisone helped, but itch came back on lower doses.   Using cerave lotion , not daily.     Started PT for hip/groin pain. Insurance switched to [place] so he needs to change providers.       Current Outpatient Medications Ordered in Epic   Medication Sig Dispense Refill    colchicine (COLCRYS) 0.6 MG Tab TAKE 2 TABLETS BY MOUTH AT FIRST ONSET OF GOUT AND TAKE ANOTHER TABLET AFTER 1 HOUR 30 Tablet 1    predniSONE (DELTASONE) 20 MG Tab 3 tabd daily for 3 days, 2 tabs daily for 3 days, 1 tab daily for 3 days. 18 Tablet 0    fexofenadine (ALLEGRA) 180 MG tablet Take 1 Tablet by mouth every day. 30 Tablet 1    OZEMPIC, 2 MG/DOSE, 8 MG/3ML Solution Pen-injector INJECT 2 MG SUBCUTANEOUSLY EVERY WEEK AS DIRECTED 9 mL 3    sildenafil citrate (VIAGRA) 100 MG tablet Take 1 tablet by mouth once daily as needed for erectile dysfunction 90 Tablet 0    lisinopril (PRINIVIL) 10 MG Tab Take 1 Tablet by mouth every day. 90 Tablet 1    pioglitazone (ACTOS) 15 MG Tab Take 1 Tablet by mouth every day. 90 Tablet 3    atorvastatin (LIPITOR) 20 MG Tab Take 1 Tablet by mouth at bedtime. 90 Tablet 1    Empagliflozin 25 MG Tab Take one tablet by mouth every day. 90 Tablet 3    Cyanocobalamin (VITAMIN B 12 PO) Take  by mouth.      aspirin EC (ECOTRIN) 81 MG Tablet Delayed Response Take 81 mg by mouth every morning.      Blood Glucose Monitoring Suppl Device Meter: Dispense Device of Insurance Preference. Sig. Use as directed for blood sugar monitoring. #1. NR. 1 Device 0    Blood Glucose Test Strips Test strips order: Test strips for glucometer. Sig: use once daily before breakfast. 50 Strip 5    Blood Glucose Test Strips Test strips order: Test strips for glucometer. Sig: use  Once daily before breakfast. 50 Strip 5    Cholecalciferol (VITAMIN  D) 2000 UNIT Tab Take 6,000 Units by mouth every morning. 3 tablets = 6000 units       No current Epic-ordered facility-administered medications on file.         ROS:  Gen: no fevers/chills, no changes in weight  Eyes: no changes in vision  ENT: no sore throat, no hearing loss, no bloody nose  Pulm: no sob, no cough  CV: no chest pain, no palpitations  GI: no nausea/vomiting, no diarrhea  : no dysuria  MSk: no myalgias  Skin: no rash  Neuro: no headaches, no numbness/tingling  Heme/Lymph: no easy bruising      Objective:     Exam:  /70 (BP Location: Left arm, Patient Position: Sitting, BP Cuff Size: Adult)   Pulse 83   Temp 36.7 °C (98 °F)   Resp 12   Ht 1.829 m (6')   Wt 111 kg (244 lb)   SpO2 97%   BMI 33.09 kg/m²  Body mass index is 33.09 kg/m².    Gen: Alert and oriented, No apparent distress.  Neck: Neck is supple without lymphadenopathy.  Lungs: Normal effort, CTA bilaterally, no wheezes, rhonchi, or rales  CV: Regular rate and rhythm. No murmurs, rubs, or gallops.  Ext: No clubbing, cyanosis, edema.      Assessment & Plan:     58 y.o. male with the following -     1. Spongiotic dermatitis  Discussed getting on a routine for daily use of lotion for skin care.  Will you go and use some hydroxyzine for the itch superiorly.  Will also get him referred to dermatology and allergy to see if we can decide what the source of this was to prevent going forward.  - Referral to Dermatology  - Referral to Allergy  - hydrOXYzine HCl (ATARAX) 25 MG Tab; Take 1 Tablet by mouth 3 times a day as needed for Itching for up to 15 days.  Dispense: 45 Tablet; Refill: 0    2. Right groin pain  Will get him referred to physical therapy at [facility] given insurance change.  - Referral to Physical Therapy    3. Strain of flexor muscle of right hip, initial encounter    - Referral to Physical Therapy    4. Essential hypertension  Chronic, stable.  Actually very well-controlled.  Will go and cut his dose down.  I do  think he probably still insist down some version of lisinopril for kidney protection and I have a hard time believing that a lower blood pressure where he is at would cause hypoperfusion but we will to make sure he is not too low.  Refill sent in.  - lisinopril (PRINIVIL) 5 MG Tab; Take 1 Tablet by mouth every day for 90 days.  Dispense: 90 Tablet; Refill: 3            No follow-ups on file.    Please note that this dictation was created using voice recognition software. I have made every reasonable attempt to correct obvious errors, but I expect that there are errors of grammar and possibly content that I did not discover before finalizing the note.

## 2023-12-28 ENCOUNTER — APPOINTMENT (OUTPATIENT)
Dept: PHYSICAL THERAPY | Facility: MEDICAL CENTER | Age: 58
End: 2023-12-28
Attending: FAMILY MEDICINE
Payer: COMMERCIAL

## 2024-01-04 ENCOUNTER — APPOINTMENT (OUTPATIENT)
Dept: PHYSICAL THERAPY | Facility: MEDICAL CENTER | Age: 59
End: 2024-01-04
Attending: FAMILY MEDICINE
Payer: COMMERCIAL

## 2024-01-08 ENCOUNTER — APPOINTMENT (OUTPATIENT)
Dept: PHYSICAL THERAPY | Facility: MEDICAL CENTER | Age: 59
End: 2024-01-08
Attending: FAMILY MEDICINE
Payer: COMMERCIAL

## 2024-01-11 ENCOUNTER — APPOINTMENT (OUTPATIENT)
Dept: PHYSICAL THERAPY | Facility: MEDICAL CENTER | Age: 59
End: 2024-01-11
Attending: FAMILY MEDICINE
Payer: COMMERCIAL

## 2024-01-15 ENCOUNTER — APPOINTMENT (OUTPATIENT)
Dept: PHYSICAL THERAPY | Facility: MEDICAL CENTER | Age: 59
End: 2024-01-15
Attending: FAMILY MEDICINE
Payer: COMMERCIAL

## 2024-01-18 ENCOUNTER — APPOINTMENT (OUTPATIENT)
Dept: PHYSICAL THERAPY | Facility: MEDICAL CENTER | Age: 59
End: 2024-01-18
Attending: FAMILY MEDICINE
Payer: COMMERCIAL

## 2024-01-31 ENCOUNTER — TELEPHONE (OUTPATIENT)
Dept: PHYSICAL THERAPY | Facility: MEDICAL CENTER | Age: 59
End: 2024-01-31

## 2024-01-31 NOTE — OP THERAPY DISCHARGE SUMMARY
Outpatient Physical Therapy  DISCHARGE SUMMARY NOTE      [facility] Outpatient Physical Therapy  [address]  [place] [address]  Phone:  [phone]  Fax:  [phone]    Date of Visit: 01/31/2024    Patient: Bijan KOWALSKI  YOB: 1965  MRN: [id number]     Referring Provider: No referring provider defined for this encounter.   Referring Diagnosis No admission diagnoses are documented for this encounter.         Functional Assessment Used        Your patient is being discharged from Physical Therapy with the following comments:   Patient cancelled or missed more than 2 scheduled appointments/non-compliant  Patient has failed to schedule or reschedule follow-up visits    Comments:  Pt was seen in skilled PT for 2 sessions with this provider. Pt has failed to schedule follow up appointments and will be discharged today due to failure to comply with POC.      Limitations Remaining:  Remaining limitations are unknown.     Recommendations:  D/C patient, if pt requests to return to receive additional skilled physical therapy services, please obtain new referral.       Lynne Turner, PT    Date: 1/31/2024

## 2024-02-16 ENCOUNTER — PATIENT MESSAGE (OUTPATIENT)
Dept: HEALTH INFORMATION MANAGEMENT | Facility: OTHER | Age: 59
End: 2024-02-16

## 2024-02-20 DIAGNOSIS — N18.32 TYPE 2 DIABETES MELLITUS WITH STAGE 3B CHRONIC KIDNEY DISEASE, WITHOUT LONG-TERM CURRENT USE OF INSULIN (HCC): ICD-10-CM

## 2024-02-20 DIAGNOSIS — E11.22 TYPE 2 DIABETES MELLITUS WITH STAGE 3B CHRONIC KIDNEY DISEASE, WITHOUT LONG-TERM CURRENT USE OF INSULIN (HCC): ICD-10-CM

## 2024-12-09 DIAGNOSIS — I10 ESSENTIAL HYPERTENSION: ICD-10-CM

## 2024-12-09 RX ORDER — LISINOPRIL 5 MG/1
5 TABLET ORAL DAILY
Qty: 90 TABLET | Refills: 0 | Status: SHIPPED | OUTPATIENT
Start: 2024-12-09

## 2024-12-09 NOTE — TELEPHONE ENCOUNTER
Received request via: Pharmacy    Was the patient seen in the last year in this department? Yes  12/21/23  Does the patient have an active prescription (recently filled or refills available) for medication(s) requested? No    Pharmacy Name: [email or web address]    Does the patient have [facility] Plus and need 100-day supply? (This applies to ALL medications) Patient does not have SCP

## 2025-03-17 DIAGNOSIS — I10 ESSENTIAL HYPERTENSION: ICD-10-CM

## 2025-03-17 RX ORDER — LISINOPRIL 5 MG/1
5 TABLET ORAL DAILY
Qty: 90 TABLET | Refills: 0 | Status: SHIPPED | OUTPATIENT
Start: 2025-03-17

## 2025-06-15 DIAGNOSIS — I10 ESSENTIAL HYPERTENSION: ICD-10-CM

## 2025-06-15 NOTE — TELEPHONE ENCOUNTER
Received request via: Pharmacy    Was the patient seen in the last year in this department? No. Last seen 12/21/2023    Does the patient have an active prescription (recently filled or refills available) for medication(s) requested? No    Pharmacy Name: [facility]    Does the patient have [facility]-way Plus and need 100-day supply? (This applies to ALL medications) Patient does not have SCP

## 2025-06-16 RX ORDER — LISINOPRIL 5 MG/1
5 TABLET ORAL DAILY
Qty: 90 TABLET | Refills: 0 | Status: SHIPPED | OUTPATIENT
Start: 2025-06-16